# Patient Record
Sex: MALE | Race: BLACK OR AFRICAN AMERICAN | NOT HISPANIC OR LATINO | Employment: OTHER | URBAN - METROPOLITAN AREA
[De-identification: names, ages, dates, MRNs, and addresses within clinical notes are randomized per-mention and may not be internally consistent; named-entity substitution may affect disease eponyms.]

---

## 2017-10-19 ENCOUNTER — HOSPITAL ENCOUNTER (EMERGENCY)
Facility: HOSPITAL | Age: 77
Discharge: HOME | End: 2017-10-19
Attending: EMERGENCY MEDICINE
Payer: MEDICARE

## 2017-10-19 VITALS
BODY MASS INDEX: 27.28 KG/M2 | OXYGEN SATURATION: 98 % | HEIGHT: 68 IN | DIASTOLIC BLOOD PRESSURE: 84 MMHG | HEART RATE: 52 BPM | RESPIRATION RATE: 18 BRPM | SYSTOLIC BLOOD PRESSURE: 170 MMHG | WEIGHT: 180 LBS | TEMPERATURE: 98 F

## 2017-10-19 DIAGNOSIS — J01.90 ACUTE BACTERIAL SINUSITIS: ICD-10-CM

## 2017-10-19 DIAGNOSIS — R42 VERTIGO: Primary | ICD-10-CM

## 2017-10-19 DIAGNOSIS — B96.89 ACUTE BACTERIAL SINUSITIS: ICD-10-CM

## 2017-10-19 DIAGNOSIS — R53.83 FATIGUE: ICD-10-CM

## 2017-10-19 DIAGNOSIS — R53.1 WEAKNESS: ICD-10-CM

## 2017-10-19 LAB
ALBUMIN SERPL BCP-MCNC: 3.9 G/DL
ALP SERPL-CCNC: 72 U/L
ALT SERPL W/O P-5'-P-CCNC: 20 U/L
ANION GAP SERPL CALC-SCNC: 7 MMOL/L
AST SERPL-CCNC: 24 U/L
BASOPHILS # BLD AUTO: 0.02 K/UL
BASOPHILS NFR BLD: 0.5 %
BILIRUB SERPL-MCNC: 0.5 MG/DL
BILIRUB UR QL STRIP: NEGATIVE
BNP SERPL-MCNC: 22 PG/ML
BUN SERPL-MCNC: 14 MG/DL
CALCIUM SERPL-MCNC: 9.5 MG/DL
CHLORIDE SERPL-SCNC: 109 MMOL/L
CLARITY UR: CLEAR
CO2 SERPL-SCNC: 26 MMOL/L
COLOR UR: YELLOW
CREAT SERPL-MCNC: 1.2 MG/DL
DIFFERENTIAL METHOD: ABNORMAL
EOSINOPHIL # BLD AUTO: 0.1 K/UL
EOSINOPHIL NFR BLD: 3.4 %
ERYTHROCYTE [DISTWIDTH] IN BLOOD BY AUTOMATED COUNT: 13.2 %
EST. GFR  (AFRICAN AMERICAN): >60 ML/MIN/1.73 M^2
EST. GFR  (NON AFRICAN AMERICAN): 57 ML/MIN/1.73 M^2
GLUCOSE SERPL-MCNC: 105 MG/DL
GLUCOSE UR QL STRIP: NEGATIVE
HCT VFR BLD AUTO: 40.1 %
HGB BLD-MCNC: 13.8 G/DL
HGB UR QL STRIP: NEGATIVE
KETONES UR QL STRIP: NEGATIVE
LEUKOCYTE ESTERASE UR QL STRIP: NEGATIVE
LYMPHOCYTES # BLD AUTO: 1.2 K/UL
LYMPHOCYTES NFR BLD: 30.8 %
MCH RBC QN AUTO: 30.1 PG
MCHC RBC AUTO-ENTMCNC: 34.4 G/DL
MCV RBC AUTO: 87 FL
MONOCYTES # BLD AUTO: 0.3 K/UL
MONOCYTES NFR BLD: 8.6 %
NEUTROPHILS # BLD AUTO: 2.2 K/UL
NEUTROPHILS NFR BLD: 56.7 %
NITRITE UR QL STRIP: NEGATIVE
PH UR STRIP: 7 [PH] (ref 5–8)
PLATELET # BLD AUTO: 167 K/UL
PMV BLD AUTO: 10.2 FL
POCT GLUCOSE: 104 MG/DL (ref 70–110)
POTASSIUM SERPL-SCNC: 3.9 MMOL/L
PROT SERPL-MCNC: 7.5 G/DL
PROT UR QL STRIP: NEGATIVE
RBC # BLD AUTO: 4.59 M/UL
SODIUM SERPL-SCNC: 142 MMOL/L
SP GR UR STRIP: 1.01 (ref 1–1.03)
TROPONIN I SERPL DL<=0.01 NG/ML-MCNC: 0.01 NG/ML
URN SPEC COLLECT METH UR: NORMAL
UROBILINOGEN UR STRIP-ACNC: NEGATIVE EU/DL
WBC # BLD AUTO: 3.83 K/UL

## 2017-10-19 PROCEDURE — 25000003 PHARM REV CODE 250: Performed by: EMERGENCY MEDICINE

## 2017-10-19 PROCEDURE — 81003 URINALYSIS AUTO W/O SCOPE: CPT

## 2017-10-19 PROCEDURE — 93005 ELECTROCARDIOGRAM TRACING: CPT

## 2017-10-19 PROCEDURE — 83880 ASSAY OF NATRIURETIC PEPTIDE: CPT

## 2017-10-19 PROCEDURE — 36000 PLACE NEEDLE IN VEIN: CPT

## 2017-10-19 PROCEDURE — 93010 ELECTROCARDIOGRAM REPORT: CPT | Mod: ,,, | Performed by: INTERNAL MEDICINE

## 2017-10-19 PROCEDURE — 87086 URINE CULTURE/COLONY COUNT: CPT

## 2017-10-19 PROCEDURE — 85025 COMPLETE CBC W/AUTO DIFF WBC: CPT

## 2017-10-19 PROCEDURE — 80053 COMPREHEN METABOLIC PANEL: CPT

## 2017-10-19 PROCEDURE — 84484 ASSAY OF TROPONIN QUANT: CPT

## 2017-10-19 PROCEDURE — 99284 EMERGENCY DEPT VISIT MOD MDM: CPT | Mod: 25

## 2017-10-19 RX ORDER — DOXYCYCLINE 100 MG/1
100 CAPSULE ORAL 2 TIMES DAILY
Qty: 20 CAPSULE | Refills: 0 | Status: SHIPPED | OUTPATIENT
Start: 2017-10-19 | End: 2017-10-29

## 2017-10-19 RX ORDER — OXYBUTYNIN CHLORIDE 5 MG/1
5 TABLET ORAL 3 TIMES DAILY
COMMUNITY
End: 2021-09-28

## 2017-10-19 RX ORDER — MONTELUKAST SODIUM 10 MG/1
10 TABLET ORAL NIGHTLY
COMMUNITY

## 2017-10-19 RX ORDER — MECLIZINE HYDROCHLORIDE 25 MG/1
25 TABLET ORAL 3 TIMES DAILY PRN
Qty: 20 TABLET | Refills: 0 | Status: SHIPPED | OUTPATIENT
Start: 2017-10-19 | End: 2018-11-28

## 2017-10-19 RX ORDER — OMEPRAZOLE 20 MG/1
20 CAPSULE, DELAYED RELEASE ORAL DAILY
COMMUNITY

## 2017-10-19 RX ADMIN — SODIUM CHLORIDE 500 ML: 0.9 INJECTION, SOLUTION INTRAVENOUS at 04:10

## 2017-10-19 NOTE — ED TRIAGE NOTES
"Pt here with with c/o dizziness and fatigue that has gotten progressively worse over the past few days. Pt reports feeling "like I was going to pass out when I stand up". Reports headache. States he was seen by PCP for allergy meds, Claritin,  to help with fluid/sinnus pressure; pt denies any relief from med.   "

## 2017-10-19 NOTE — ED PROVIDER NOTES
Encounter Date: 10/19/2017    SCRIBE #1 NOTE: I, Emilia Ring, am scribing for, and in the presence of,  George Pate MD. I have scribed the following portions of the note - Other sections scribed: HPI and ROS.       History     Chief Complaint   Patient presents with    Fatigue     weak and dizzy since about 9am this morning     CC: Fatigue    HPI: This 77 y.o male who has Hypercholesteremia, Hypertension, LUTS, Nocturia presents to the ED c/o acute onset, constant fatigue with associated generalized weakness, dizziness, and a frontal headache described as heaviness and pressure that began this morning.  Patient reports his symptoms worsened with attempting to stand, walk, or bending.  Patient reports having the sensation of wanting to faint. Patient reports he has been monitoring his blood pressure, which he reports initially being elevated, but states it has since lowered.  Patient denies fever, chills, nausea, emesis, diarrhea, abdominal pain, back pain, chest pain, shortness of breath, or any other associated symptoms.  Patient reports having left eye surgery in July and right eye surgery in September.  Patient reports recently being evaluated and reports both pressures in his eyes were normal and that he has 20/20 vision.  No prior tx.  No alleviating factors.      The history is provided by the patient. No  was used.     Review of patient's allergies indicates:  No Known Allergies  Past Medical History:   Diagnosis Date    High cholesterol     Hypertension     Hypertrophy of prostate without urinary obstruction and other lower urinary tract symptoms (LUTS)     Impotence of organic origin     Nocturia     Urinary frequency      Past Surgical History:   Procedure Laterality Date    CATARACT EXTRACTION      cysto/turp      CYSTOSCOPY       History reviewed. No pertinent family history.  Social History   Substance Use Topics    Smoking status: Never Smoker    Smokeless tobacco:  Never Used    Alcohol use No     Review of Systems   Constitutional: Positive for fatigue. Negative for chills and fever.   HENT: Negative for ear pain and sore throat.    Eyes: Negative for pain.   Respiratory: Negative for cough and shortness of breath.    Cardiovascular: Negative for chest pain.   Gastrointestinal: Negative for abdominal pain, diarrhea, nausea and vomiting.   Genitourinary: Negative for dysuria.   Musculoskeletal: Negative for back pain.   Skin: Negative for rash.   Neurological: Positive for dizziness, weakness and headaches.   All other systems reviewed and are negative.      Physical Exam     Initial Vitals [10/19/17 1402]   BP Pulse Resp Temp SpO2   (!) 140/78 (!) 58 18 98.2 °F (36.8 °C) 98 %      MAP       98.67         Physical Exam    Nursing note and vitals reviewed.  Constitutional: He appears well-developed and well-nourished. He is not diaphoretic.   HENT:   Head: Normocephalic and atraumatic.   Mouth/Throat: Oropharynx is clear and moist.   Eyes: Conjunctivae and EOM are normal. No scleral icterus.   Neck: Normal range of motion. Neck supple.   Cardiovascular:   Bradycardic, regular rhythm   Pulmonary/Chest: Breath sounds normal.   Abdominal: Soft. He exhibits no distension. There is no tenderness.   Musculoskeletal: Normal range of motion.   Neurological: He is alert and oriented to person, place, and time. He has normal strength.   Skin: Skin is warm and dry. No rash noted.   Psychiatric: He has a normal mood and affect. Thought content normal.         ED Course   Procedures  Labs Reviewed   CBC W/ AUTO DIFFERENTIAL - Abnormal; Notable for the following:        Result Value    WBC 3.83 (*)     RBC 4.59 (*)     Hemoglobin 13.8 (*)     All other components within normal limits   COMPREHENSIVE METABOLIC PANEL - Abnormal; Notable for the following:     Anion Gap 7 (*)     eGFR if non  57 (*)     All other components within normal limits   CULTURE, URINE   URINALYSIS    TROPONIN I   B-TYPE NATRIURETIC PEPTIDE   POCT GLUCOSE   POCT GLUCOSE MONITORING CONTINUOUS     EKG Readings: (Independently Interpreted)   Initial Reading: No STEMI.   Sinus bradycardia, rate 59, normal axis/intervals, no ST/T-wave abnormalities.       X-Rays:   Independently Interpreted Readings:   Chest X-Ray: Normal heart size.  No infiltrates.  No acute abnormalities.     Medical Decision Making:   History:   I obtained history from: someone other than patient.  Old Medical Records: I decided to obtain old medical records.  Old Records Summarized: records from clinic visits and other records.  Initial Assessment:   77-year-old male with a history of vertigo presents to the emergency department complaining of generalized weakness, sinus congestion and dizziness for the past several days.  On exam, he has bilateral maxillary tenderness to palpation.  The remainder of his exam is unremarkable.  Vital signs reassuring - afebrile.  Orthostatics negative.  EKG reveals sinus bradycardia, rate 59, otherwise normal.    Independently Interpreted Test(s):   I have ordered and independently interpreted X-rays - see prior notes.  I have ordered and independently interpreted EKG Reading(s) - see prior notes  Clinical Tests:   Lab Tests: Ordered and Reviewed  Radiological Study: Ordered and Reviewed  Medical Tests: Ordered and Reviewed  ED Management:  Chest x-ray normal.  Basic labs including troponin, BNP and UA wnls.  CT head without acute findings.  IV fluids given.  Recommend close follow-up with his primary care physician for reevaluation further management including ENT referral if needed.  The meantime, he has been empirically started on doxycycline for presumed sinus along with meclizine to be used prn meclizine.  Return instructions discussed prior to discharge.            Scribe Attestation:   Scribe #1: I performed the above scribed service and the documentation accurately describes the services I performed. I  attest to the accuracy of the note.    Attending Attestation:           Physician Attestation for Scribe:  Physician Attestation Statement for Scribe #1: I, George Pate MD, reviewed documentation, as scribed by Emilia Ring in my presence, and it is both accurate and complete.                 ED Course      Clinical Impression:   The primary encounter diagnosis was Vertigo. Diagnoses of Fatigue, Weakness, and Acute bacterial sinusitis were also pertinent to this visit.    Disposition:   Disposition: Discharged                        George Pate MD  10/19/17 [id number]

## 2017-10-21 LAB — BACTERIA UR CULT: NO GROWTH

## 2018-11-28 ENCOUNTER — TELEPHONE (OUTPATIENT)
Dept: NEUROLOGY | Facility: CLINIC | Age: 78
End: 2018-11-28

## 2018-11-28 ENCOUNTER — OFFICE VISIT (OUTPATIENT)
Dept: NEUROLOGY | Facility: CLINIC | Age: 78
End: 2018-11-28
Payer: MEDICARE

## 2018-11-28 ENCOUNTER — LAB VISIT (OUTPATIENT)
Dept: LAB | Facility: HOSPITAL | Age: 78
End: 2018-11-28
Payer: MEDICARE

## 2018-11-28 VITALS
DIASTOLIC BLOOD PRESSURE: 76 MMHG | WEIGHT: 184.31 LBS | SYSTOLIC BLOOD PRESSURE: 136 MMHG | HEART RATE: 55 BPM | HEIGHT: 68 IN | BODY MASS INDEX: 27.93 KG/M2

## 2018-11-28 DIAGNOSIS — G62.9 PERIPHERAL POLYNEUROPATHY: ICD-10-CM

## 2018-11-28 DIAGNOSIS — R27.0 ATAXIA: ICD-10-CM

## 2018-11-28 DIAGNOSIS — D50.9 IRON DEFICIENCY ANEMIA, UNSPECIFIED IRON DEFICIENCY ANEMIA TYPE: ICD-10-CM

## 2018-11-28 DIAGNOSIS — R63.8 OTHER SYMPTOMS AND SIGNS CONCERNING FOOD AND FLUID INTAKE: ICD-10-CM

## 2018-11-28 DIAGNOSIS — R53.83 FATIGUE, UNSPECIFIED TYPE: ICD-10-CM

## 2018-11-28 DIAGNOSIS — H02.403 PTOSIS, BILATERAL: Chronic | ICD-10-CM

## 2018-11-28 DIAGNOSIS — R42 VERTIGO: Primary | ICD-10-CM

## 2018-11-28 LAB
BASOPHILS # BLD AUTO: 0.03 K/UL
BASOPHILS NFR BLD: 0.6 %
CREAT SERPL-MCNC: 1 MG/DL
DIFFERENTIAL METHOD: ABNORMAL
EOSINOPHIL # BLD AUTO: 0.1 K/UL
EOSINOPHIL NFR BLD: 1.7 %
ERYTHROCYTE [DISTWIDTH] IN BLOOD BY AUTOMATED COUNT: 13.1 %
EST. GFR  (AFRICAN AMERICAN): >60 ML/MIN/1.73 M^2
EST. GFR  (NON AFRICAN AMERICAN): >60 ML/MIN/1.73 M^2
FERRITIN SERPL-MCNC: 190 NG/ML
FOLATE SERPL-MCNC: 36.3 NG/ML
HCT VFR BLD AUTO: 41.6 %
HGB BLD-MCNC: 13.8 G/DL
IMM GRANULOCYTES # BLD AUTO: 0.01 K/UL
IMM GRANULOCYTES NFR BLD AUTO: 0.2 %
IRON SERPL-MCNC: 74 UG/DL
LYMPHOCYTES # BLD AUTO: 1.5 K/UL
LYMPHOCYTES NFR BLD: 30.9 %
MCH RBC QN AUTO: 30 PG
MCHC RBC AUTO-ENTMCNC: 33.2 G/DL
MCV RBC AUTO: 90 FL
MONOCYTES # BLD AUTO: 0.4 K/UL
MONOCYTES NFR BLD: 9.1 %
NEUTROPHILS # BLD AUTO: 2.7 K/UL
NEUTROPHILS NFR BLD: 57.5 %
NRBC BLD-RTO: 0 /100 WBC
PLATELET # BLD AUTO: 166 K/UL
PMV BLD AUTO: 10.3 FL
RBC # BLD AUTO: 4.6 M/UL
SATURATED IRON: 25 %
TOTAL IRON BINDING CAPACITY: 293 UG/DL
TRANSFERRIN SERPL-MCNC: 198 MG/DL
VIT B12 SERPL-MCNC: 393 PG/ML
WBC # BLD AUTO: 4.72 K/UL

## 2018-11-28 PROCEDURE — 84425 ASSAY OF VITAMIN B-1: CPT

## 2018-11-28 PROCEDURE — 83540 ASSAY OF IRON: CPT

## 2018-11-28 PROCEDURE — 82565 ASSAY OF CREATININE: CPT

## 2018-11-28 PROCEDURE — 99999 PR PBB SHADOW E&M-EST. PATIENT-LVL IV: CPT | Mod: PBBFAC,GC,, | Performed by: STUDENT IN AN ORGANIZED HEALTH CARE EDUCATION/TRAINING PROGRAM

## 2018-11-28 PROCEDURE — 82746 ASSAY OF FOLIC ACID SERUM: CPT

## 2018-11-28 PROCEDURE — 85025 COMPLETE CBC W/AUTO DIFF WBC: CPT

## 2018-11-28 PROCEDURE — 84165 PROTEIN E-PHORESIS SERUM: CPT | Mod: 26,,, | Performed by: PATHOLOGY

## 2018-11-28 PROCEDURE — 82728 ASSAY OF FERRITIN: CPT

## 2018-11-28 PROCEDURE — 84207 ASSAY OF VITAMIN B-6: CPT

## 2018-11-28 PROCEDURE — 84165 PROTEIN E-PHORESIS SERUM: CPT

## 2018-11-28 PROCEDURE — 82525 ASSAY OF COPPER: CPT

## 2018-11-28 PROCEDURE — 1101F PT FALLS ASSESS-DOCD LE1/YR: CPT | Mod: CPTII,GC,S$GLB, | Performed by: STUDENT IN AN ORGANIZED HEALTH CARE EDUCATION/TRAINING PROGRAM

## 2018-11-28 PROCEDURE — 82607 VITAMIN B-12: CPT

## 2018-11-28 PROCEDURE — 99205 OFFICE O/P NEW HI 60 MIN: CPT | Mod: GC,S$GLB,, | Performed by: STUDENT IN AN ORGANIZED HEALTH CARE EDUCATION/TRAINING PROGRAM

## 2018-11-28 PROCEDURE — 36415 COLL VENOUS BLD VENIPUNCTURE: CPT

## 2018-11-28 NOTE — PATIENT INSTRUCTIONS
Please stop by the lab on the 2nd floor to obtain labs.   Will have you back in the clinic when the results of labs and MRI is back

## 2018-11-28 NOTE — PROGRESS NOTES
Neurology Clinic  Initial Consult Visit    Patient Name: Chano Guillen  MRN: [id number]    CC: dizziness and weakness    HPI: Chano Guillen is a 78 y.o. male with HTN, presenting for evaluation of dizziness and lower extremity weakness.    Patient is complaining of generalized fatigue, lower extremity weakness and dizziness within the last 6-12 months. He states that dizziness happens specially when bending forward, but not necessarily when getting up from bed or chair, overall he feels unsteady when walking. Also endorses burning pain in bilateral lower extremities. He also has droopy eyelids which he and his wife state that has been always like this and is similar to family. He denies recent weight loss, difficulty swallowing, choking or difficulty breathing. He has a strange sensation behind his eyes, not necessarily pain but fullness (?), has PND and dry mouth. He has been taking meclizine since oct 2017 for dizziness w/o any response. He denies smoking or drinking alcohol      Review of Systems:  General: fevers -, chills -, fatigue +  Eyes: changes in vision -  ENT: hearing loss -, difficulty swallowing -, change in voice -  Respiratory: SOB -, cough -,  choking -  CV: chest pain -, palpitations -  GI: nausea -, vomiting -, abdominal pain -  Urinary: dysuria -, hematuria -, frequency -  Skin: rash -, change of color -  Neurological: Headache -, dizziness +, weakness +, numbness -, seizure -, confusion -, gait problem +  Psychiatric: hallucinations -, dysphoric mood -, anxiety -      Past Medical History  Past Medical History:   Diagnosis Date    High cholesterol     Hypertension     Hypertrophy of prostate without urinary obstruction and other lower urinary tract symptoms (LUTS)     Impotence of organic origin     Nocturia     Urinary frequency        Medications    Current Outpatient Medications:     amlodipine (NORVASC) 5 MG tablet, Take 10 mg by mouth once daily. , Disp: , Rfl:     aspirin  "(ECOTRIN) 81 MG EC tablet, Take 81 mg by mouth once daily., Disp: , Rfl:     finasteride (PROSCAR) 5 mg tablet, Take 5 mg by mouth once daily., Disp: , Rfl:     montelukast (SINGULAIR) 10 mg tablet, Take 10 mg by mouth every evening., Disp: , Rfl:     MULTIVIT-MINERALS/FERROUS FUM (MULTI VITAMIN ORAL), Take by mouth., Disp: , Rfl:     OMEGA-3/DHA/EPA/FISH OIL (OMEGA-3 ORAL), Take by mouth., Disp: , Rfl:     omeprazole (PRILOSEC) 20 MG capsule, Take 20 mg by mouth once daily., Disp: , Rfl:     oxybutynin (DITROPAN) 5 MG Tab, Take 5 mg by mouth 3 (three) times daily., Disp: , Rfl:     VITAMIN D2 50,000 unit capsule, , Disp: , Rfl: 0  Any other notable medications as documented in HPI    Allergies  Review of patient's allergies indicates:  No Known Allergies    Social History  Social History     Socioeconomic History    Marital status:      Spouse name: Not on file    Number of children: Not on file    Years of education: Not on file    Highest education level: Not on file   Social Needs    Financial resource strain: Not on file    Food insecurity - worry: Not on file    Food insecurity - inability: Not on file    Transportation needs - medical: Not on file    Transportation needs - non-medical: Not on file   Occupational History    Not on file   Tobacco Use    Smoking status: Never Smoker    Smokeless tobacco: Never Used   Substance and Sexual Activity    Alcohol use: No    Drug use: No    Sexual activity: Not on file   Other Topics Concern    Not on file   Social History Narrative    Not on file     Any other notable Social History as documented in HPI.    Family History  No family history on file.  Any other notable FMH as documented in HPI.    Physical Exam  /76   Pulse (!) 55   Ht 5' 8" (1.727 m)   Wt 83.6 kg (184 lb 4.9 oz)   BMI 28.02 kg/m²     General: Well-developed, well-groomed. No apparent distress  HENT: Normocephalic, atraumatic. No carotid bruits auscultated. Ear " canal clear and tympanic membrane with normal cone of light and no erythema nor bulge.  Cardiovascular: Regular rate and rhythm with no murmurs, rubs or gallops.    Chest: Lungs clear to auscultation bilaterally.  No wheezes, stridor, ronchi appreciated.  Abdomen: Normoactive bowel sounds present.  Soft, nontender to palpation.  Musculoskeletal: No peripheral edema  Neurologic Exam: The patient is awake, alert and oriented. Language is fluent.  Fund of knowledge is appropriate.     Cranial nerves:   PERRLA. EOM intact. No Nystagmus. No ophthalmoplegia.  Visual fields are full to confrontation.    Facial sensation is normal to light touch.   Facial expression is full and symmetric, except for bilateral ptosis.  Hearing is intact bilaterally.   Palate elevates symmetrically.   SCM and Trapezius full strength bilaterally.   Tongue is midline.     Motor examination   normal bulk and tone in all four limbs. There are no atrophy or fasciculations.   Strength is 4+/5 in lower extremities and 5/5 on upper extremities bilaterally.    Sensory examination  Sensation to light touch: intact in BUE and BLE  Sensation to temperature: intact in BUE and BLE  Sensation to vibration: decreased up to mid shin  Romberg is positive.    Deep tendon reflexes   1+ and symmetric in the upper and lower extremities bilaterally.    No clonus. Babinski normal bilaterally.    Gait and Coordination:  Normal gait.  abnormal tandem walking.  Finger to nose is normal bilaterally.      Lab and Test Results    WBC   Date Value Ref Range Status   11/28/2018 4.72 3.90 - 12.70 K/uL Final   10/19/2017 3.83 (L) 3.90 - 12.70 K/uL Final   03/19/2015 4.39 3.90 - 12.70 K/uL Final     Hemoglobin   Date Value Ref Range Status   11/28/2018 13.8 (L) 14.0 - 18.0 g/dL Final   10/19/2017 13.8 (L) 14.0 - 18.0 g/dL Final   03/19/2015 12.6 (L) 14.0 - 18.0 g/dL Final     Hematocrit   Date Value Ref Range Status   11/28/2018 41.6 40.0 - 54.0 % Final   10/19/2017 40.1 40.0  - 54.0 % Final   03/19/2015 36.9 (L) 40.0 - 54.0 % Final     Platelets   Date Value Ref Range Status   11/28/2018 166 150 - 350 K/uL Final   10/19/2017 167 150 - 350 K/uL Final   03/19/2015 176 150 - 350 K/uL Final     Glucose   Date Value Ref Range Status   10/19/2017 105 70 - 110 mg/dL Final   03/19/2015 97 70 - 110 mg/dL Final   06/29/2011 115 (H) 70 - 110 mg/dl Final     Sodium   Date Value Ref Range Status   10/19/2017 142 136 - 145 mmol/L Final   03/19/2015 144 136 - 145 mmol/L Final   06/29/2011 143 136 - 145 mmol/L Final     Potassium   Date Value Ref Range Status   10/19/2017 3.9 3.5 - 5.1 mmol/L Final   03/19/2015 3.8 3.5 - 5.1 mmol/L Final   06/29/2011 3.1 (L) 3.5 - 5.1 mmol/L Final     Chloride   Date Value Ref Range Status   10/19/2017 109 95 - 110 mmol/L Final   03/19/2015 109 95 - 110 mmol/L Final   06/29/2011 112 (H) 95 - 110 mmol/L Final     CO2   Date Value Ref Range Status   10/19/2017 26 23 - 29 mmol/L Final   03/19/2015 26 23 - 29 mmol/L Final   06/29/2011 21 (L) 23.0 - 29.0 mmol/L Final     BUN, Bld   Date Value Ref Range Status   10/19/2017 14 8 - 23 mg/dL Final   03/19/2015 17 8 - 23 mg/dL Final   06/29/2011 25 (H) 8 - 23 mg/dl Final     Creatinine   Date Value Ref Range Status   11/28/2018 1.0 0.5 - 1.4 mg/dL Final   10/19/2017 1.2 0.5 - 1.4 mg/dL Final   03/19/2015 1.3 0.5 - 1.4 mg/dL Final     Calcium   Date Value Ref Range Status   10/19/2017 9.5 8.7 - 10.5 mg/dL Final   03/19/2015 9.6 8.7 - 10.5 mg/dL Final   06/29/2011 10.0 8.7 - 10.5 mg/dl Final     Alkaline Phosphatase   Date Value Ref Range Status   10/19/2017 72 55 - 135 U/L Final   03/19/2015 46 (L) 55 - 135 U/L Final   06/29/2011 67 55 - 135 U/L Final     ALT   Date Value Ref Range Status   10/19/2017 20 10 - 44 U/L Final   03/19/2015 15 10 - 44 U/L Final   06/29/2011 29 10 - 44 U/L Final     AST   Date Value Ref Range Status   10/19/2017 24 10 - 40 U/L Final   03/19/2015 20 10 - 40 U/L Final   06/29/2011 28 10 - 40 U/L Final          Images:  None      Assessment and Plan    Problem List Items Addressed This Visit        Neuro    Ataxia    Relevant Orders    MRI Brain W WO Contrast (Completed)    VITAMIN B12 (Completed)    FOLATE (Completed)    VITAMIN B1 (Completed)    Peripheral neuropathy    Current Assessment & Plan     Will send for peripheral neuropathy labs   Will have him follow up with results         Relevant Orders    IRON AND TIBC (Completed)    Ferritin (Completed)    VITAMIN B6 (Completed)    COPPER, SERUM (Completed)    PROTEIN ELECTROPHORESIS, SERUM (Completed)    VDRL, CSF       Ophtho    Ptosis, bilateral (Chronic)    Current Assessment & Plan     Benign Familial ptosis  No work up necessary            ENT    Vertigo - Primary    Current Assessment & Plan     Need to rule out both central and peripheral pathologies due to poor description of the symptoms unable to distinguish clinically    MRI Brain W/WO   VNG and posturography         Relevant Orders    MRI Brain W WO Contrast (Completed)       Other    Fatigue    Current Assessment & Plan     Might be due to iron deficiency          Relevant Orders    IRON AND TIBC (Completed)    Ferritin (Completed)    Creatinine, serum (Completed)    CBC auto differential (Completed)      Other Visit Diagnoses     Other symptoms and signs concerning food and fluid intake         Relevant Orders    IRON AND TIBC (Completed)    Iron deficiency anemia, unspecified iron deficiency anemia type         Relevant Orders    Ferritin (Completed)              Karina Marks MD  PGY-II, Neurology Resident  [facility]  [address] [place] [address]

## 2018-11-29 LAB
ALBUMIN SERPL ELPH-MCNC: 4.35 G/DL
ALPHA1 GLOB SERPL ELPH-MCNC: 0.27 G/DL
ALPHA2 GLOB SERPL ELPH-MCNC: 0.63 G/DL
B-GLOBULIN SERPL ELPH-MCNC: 0.74 G/DL
GAMMA GLOB SERPL ELPH-MCNC: 1.31 G/DL
PATHOLOGIST INTERPRETATION SPE: NORMAL
PROT SERPL-MCNC: 7.3 G/DL

## 2018-12-03 LAB
PYRIDOXAL SERPL-MCNC: 54 UG/L (ref 5–50)
VIT B1 BLD-MCNC: 47 UG/L (ref 38–122)

## 2018-12-04 LAB — COPPER SERPL-MCNC: 936 UG/L (ref 665–1480)

## 2018-12-07 ENCOUNTER — HOSPITAL ENCOUNTER (OUTPATIENT)
Dept: RADIOLOGY | Facility: HOSPITAL | Age: 78
Discharge: HOME | End: 2018-12-07
Attending: STUDENT IN AN ORGANIZED HEALTH CARE EDUCATION/TRAINING PROGRAM
Payer: MEDICARE

## 2018-12-07 DIAGNOSIS — R27.0 ATAXIA: ICD-10-CM

## 2018-12-07 DIAGNOSIS — R42 VERTIGO: ICD-10-CM

## 2018-12-07 PROCEDURE — 70553 MRI BRAIN STEM W/O & W/DYE: CPT | Mod: TC

## 2018-12-07 PROCEDURE — 25500020 PHARM REV CODE 255: Performed by: STUDENT IN AN ORGANIZED HEALTH CARE EDUCATION/TRAINING PROGRAM

## 2018-12-07 PROCEDURE — A9585 GADOBUTROL INJECTION: HCPCS | Performed by: STUDENT IN AN ORGANIZED HEALTH CARE EDUCATION/TRAINING PROGRAM

## 2018-12-07 PROCEDURE — 70553 MRI BRAIN STEM W/O & W/DYE: CPT | Mod: 26,,, | Performed by: RADIOLOGY

## 2018-12-07 RX ORDER — GADOBUTROL 604.72 MG/ML
9 INJECTION INTRAVENOUS
Status: COMPLETED | OUTPATIENT
Start: 2018-12-07 | End: 2018-12-07

## 2018-12-07 RX ADMIN — GADOBUTROL 9 ML: 604.72 INJECTION INTRAVENOUS at 01:12

## 2018-12-07 NOTE — PROGRESS NOTES
I have personally seen and evaluated this patient. I have confirmed key portions of the history and examination. I concurred with the residents findings and documentation      Ina Gomez M.D

## 2018-12-13 ENCOUNTER — TELEPHONE (OUTPATIENT)
Dept: NEUROLOGY | Facility: CLINIC | Age: 78
End: 2018-12-13

## 2018-12-13 NOTE — TELEPHONE ENCOUNTER
----- Message from Lashell Carrillo sent at 12/13/2018  1:55 PM CST -----  Test Results    Type of Test:CT/MRI  Date of Test:  Communication Preference:Tamika (wife) @ [phone]  Additional Information:

## 2018-12-14 ENCOUNTER — TELEPHONE (OUTPATIENT)
Dept: NEUROLOGY | Facility: CLINIC | Age: 78
End: 2018-12-14

## 2018-12-14 NOTE — TELEPHONE ENCOUNTER
----- Message from Lashell Carrillo sent at 12/14/2018  1:25 PM CST -----  Test Results    Type of Test:MRI/Ct  Date of Test:12/7  Communication Preference: Tamika (wife) @ [phone]  Additional Information:

## 2019-01-02 ENCOUNTER — OFFICE VISIT (OUTPATIENT)
Dept: NEUROLOGY | Facility: CLINIC | Age: 79
End: 2019-01-02
Payer: MEDICARE

## 2019-01-02 VITALS
DIASTOLIC BLOOD PRESSURE: 77 MMHG | HEIGHT: 68 IN | BODY MASS INDEX: 27.87 KG/M2 | HEART RATE: 46 BPM | SYSTOLIC BLOOD PRESSURE: 142 MMHG | WEIGHT: 183.88 LBS

## 2019-01-02 DIAGNOSIS — E07.9 DISORDER OF THYROID: ICD-10-CM

## 2019-01-02 DIAGNOSIS — R42 VERTIGO: Primary | ICD-10-CM

## 2019-01-02 DIAGNOSIS — R26.89 IMBALANCE: ICD-10-CM

## 2019-01-02 DIAGNOSIS — H51.9 ABNORMAL EYE MOVEMENTS: ICD-10-CM

## 2019-01-02 PROCEDURE — 1101F PT FALLS ASSESS-DOCD LE1/YR: CPT | Mod: CPTII,GC,S$GLB, | Performed by: STUDENT IN AN ORGANIZED HEALTH CARE EDUCATION/TRAINING PROGRAM

## 2019-01-02 PROCEDURE — 99999 PR PBB SHADOW E&M-EST. PATIENT-LVL IV: ICD-10-PCS | Mod: PBBFAC,GC,, | Performed by: STUDENT IN AN ORGANIZED HEALTH CARE EDUCATION/TRAINING PROGRAM

## 2019-01-02 PROCEDURE — 99999 PR PBB SHADOW E&M-EST. PATIENT-LVL IV: CPT | Mod: PBBFAC,GC,, | Performed by: STUDENT IN AN ORGANIZED HEALTH CARE EDUCATION/TRAINING PROGRAM

## 2019-01-02 PROCEDURE — 1101F PR PT FALLS ASSESS DOC 0-1 FALLS W/OUT INJ PAST YR: ICD-10-PCS | Mod: CPTII,GC,S$GLB, | Performed by: STUDENT IN AN ORGANIZED HEALTH CARE EDUCATION/TRAINING PROGRAM

## 2019-01-02 PROCEDURE — 99215 PR OFFICE/OUTPT VISIT, EST, LEVL V, 40-54 MIN: ICD-10-PCS | Mod: GC,S$GLB,, | Performed by: STUDENT IN AN ORGANIZED HEALTH CARE EDUCATION/TRAINING PROGRAM

## 2019-01-02 PROCEDURE — 99215 OFFICE O/P EST HI 40 MIN: CPT | Mod: GC,S$GLB,, | Performed by: STUDENT IN AN ORGANIZED HEALTH CARE EDUCATION/TRAINING PROGRAM

## 2019-01-02 NOTE — ASSESSMENT & PLAN NOTE
Need to rule out both central and peripheral pathologies due to poor description of the symptoms unable to distinguish clinically    MRI Brain W/WO   VNG and posturography

## 2019-01-03 ENCOUNTER — PATIENT MESSAGE (OUTPATIENT)
Dept: NEUROLOGY | Facility: CLINIC | Age: 79
End: 2019-01-03

## 2019-01-07 NOTE — PROGRESS NOTES
Neurology Clinic  Follow up Visit    Patient Name: Chano Guillen  MRN: [id number]    CC: dizziness and weakness    HPI: Chano Guillen is a 78 y.o. male with HTN, presenting for follow up on dizziness and lower extremity weakness.    The patient is still complaining of generalized weakness and dizziness specially when bending forward. He also mentions retr-orbital pressure. symptoms are intermittent, and has been going on for 2 years. Prior labs and imaging obtained after the last visit all within normal limits.    Initial Encounter on 11/28/18:  Patient is complaining of generalized fatigue, lower extremity weakness and dizziness within the last 6-12 months. He states that dizziness happens specially when bending forward, but not necessarily when getting up from bed or chair, overall he feels unsteady when walking. Also endorses burning pain in bilateral lower extremities. He also has droopy eyelids which he and his wife state that has been always like this and is similar to family. He denies recent weight loss, difficulty swallowing, choking or difficulty breathing. He has a strange sensation behind his eyes, not necessarily pain but fullness (?), has PND and dry mouth. He has been taking meclizine since oct 2017 for dizziness w/o any response. He denies smoking or drinking alcohol      Review of Systems:  General: fevers -, chills -, fatigue +  Eyes: changes in vision -  ENT: hearing loss -, difficulty swallowing -, change in voice -  Respiratory: SOB -, cough -,  choking -  CV: chest pain -, palpitations -  GI: nausea -, vomiting -, abdominal pain -  Urinary: dysuria -, hematuria -, frequency -  Skin: rash -, change of color -  Neurological: Headache -, dizziness +, weakness +, numbness -, seizure -, confusion -, gait problem +  Psychiatric: hallucinations -, dysphoric mood -, anxiety -      Past Medical History  Past Medical History:   Diagnosis Date    High cholesterol     Hypertension     Hypertrophy  of prostate without urinary obstruction and other lower urinary tract symptoms (LUTS)     Impotence of organic origin     Nocturia     Urinary frequency        Medications    Current Outpatient Medications:     amlodipine (NORVASC) 5 MG tablet, Take 10 mg by mouth once daily. , Disp: , Rfl:     aspirin (ECOTRIN) 81 MG EC tablet, Take 81 mg by mouth once daily., Disp: , Rfl:     finasteride (PROSCAR) 5 mg tablet, Take 5 mg by mouth once daily., Disp: , Rfl:     montelukast (SINGULAIR) 10 mg tablet, Take 10 mg by mouth every evening., Disp: , Rfl:     MULTIVIT-MINERALS/FERROUS FUM (MULTI VITAMIN ORAL), Take by mouth., Disp: , Rfl:     omeprazole (PRILOSEC) 20 MG capsule, Take 20 mg by mouth once daily., Disp: , Rfl:     oxybutynin (DITROPAN) 5 MG Tab, Take 5 mg by mouth 3 (three) times daily., Disp: , Rfl:     VITAMIN D2 50,000 unit capsule, 5,000 Units. , Disp: , Rfl: 0    OMEGA-3/DHA/EPA/FISH OIL (OMEGA-3 ORAL), Take by mouth., Disp: , Rfl:     sod bicarb-sod chlor-neti pot (SINUS WASH NETIPOT) pkdv, 1 packet by Nasal route 2 (two) times daily as needed (nasal congestion)., Disp: , Rfl:   Any other notable medications as documented in HPI    Allergies  Review of patient's allergies indicates:  No Known Allergies    Social History  Social History     Socioeconomic History    Marital status:      Spouse name: Not on file    Number of children: Not on file    Years of education: Not on file    Highest education level: Not on file   Social Needs    Financial resource strain: Not on file    Food insecurity - worry: Not on file    Food insecurity - inability: Not on file    Transportation needs - medical: Not on file    Transportation needs - non-medical: Not on file   Occupational History    Not on file   Tobacco Use    Smoking status: Never Smoker    Smokeless tobacco: Never Used   Substance and Sexual Activity    Alcohol use: No    Drug use: No    Sexual activity: Not on file   Other  "Topics Concern    Not on file   Social History Narrative    Not on file     Any other notable Social History as documented in HPI.    Family History  History reviewed. No pertinent family history.  Any other notable FMH as documented in HPI.    Physical Exam  BP (!) 142/77   Pulse (!) 46   Ht 5' 8" (1.727 m)   Wt 83.4 kg (183 lb 13.8 oz)   BMI 27.96 kg/m²     General: Well-developed, well-groomed. No apparent distress  HENT: Normocephalic, atraumatic. No carotid bruits auscultated. Ear canal clear and tympanic membrane with normal cone of light and no erythema nor bulge.  Cardiovascular: Regular rate and rhythm with no murmurs, rubs or gallops.    Chest: Lungs clear to auscultation bilaterally.  No wheezes, stridor, ronchi appreciated.  Abdomen: Normoactive bowel sounds present.  Soft, nontender to palpation.  Musculoskeletal: No peripheral edema  Neurologic Exam: The patient is awake, alert and oriented. Language is fluent.  Fund of knowledge is appropriate.     Cranial nerves:   PERRLA. EOM intact. No Nystagmus. No ophthalmoplegia.  Visual fields are full to confrontation.    Facial sensation is normal to light touch.   Facial expression is full and symmetric, except for bilateral ptosis.  Hearing is intact bilaterally.   Palate elevates symmetrically.   SCM and Trapezius full strength bilaterally.   Tongue is midline.     Motor examination   normal bulk and tone in all four limbs. There are no atrophy or fasciculations.   Strength is 4+/5 in lower extremities and 5/5 on upper extremities bilaterally.    Sensory examination  Sensation to light touch: intact in BUE and BLE  Sensation to temperature: intact in BUE and BLE  Sensation to vibration: decreased up to mid shin  Romberg is positive.    Deep tendon reflexes   1+ and symmetric in the upper and lower extremities bilaterally.    No clonus. Babinski normal bilaterally.    Gait and Coordination:  Normal gait.  abnormal tandem walking.  Finger to nose is " normal bilaterally.      Lab and Test Results    WBC   Date Value Ref Range Status   11/28/2018 4.72 3.90 - 12.70 K/uL Final   10/19/2017 3.83 (L) 3.90 - 12.70 K/uL Final   03/19/2015 4.39 3.90 - 12.70 K/uL Final     Hemoglobin   Date Value Ref Range Status   11/28/2018 13.8 (L) 14.0 - 18.0 g/dL Final   10/19/2017 13.8 (L) 14.0 - 18.0 g/dL Final   03/19/2015 12.6 (L) 14.0 - 18.0 g/dL Final     Hematocrit   Date Value Ref Range Status   11/28/2018 41.6 40.0 - 54.0 % Final   10/19/2017 40.1 40.0 - 54.0 % Final   03/19/2015 36.9 (L) 40.0 - 54.0 % Final     Platelets   Date Value Ref Range Status   11/28/2018 166 150 - 350 K/uL Final   10/19/2017 167 150 - 350 K/uL Final   03/19/2015 176 150 - 350 K/uL Final     Glucose   Date Value Ref Range Status   10/19/2017 105 70 - 110 mg/dL Final   03/19/2015 97 70 - 110 mg/dL Final   06/29/2011 115 (H) 70 - 110 mg/dl Final     Sodium   Date Value Ref Range Status   10/19/2017 142 136 - 145 mmol/L Final   03/19/2015 144 136 - 145 mmol/L Final   06/29/2011 143 136 - 145 mmol/L Final     Potassium   Date Value Ref Range Status   10/19/2017 3.9 3.5 - 5.1 mmol/L Final   03/19/2015 3.8 3.5 - 5.1 mmol/L Final   06/29/2011 3.1 (L) 3.5 - 5.1 mmol/L Final     Chloride   Date Value Ref Range Status   10/19/2017 109 95 - 110 mmol/L Final   03/19/2015 109 95 - 110 mmol/L Final   06/29/2011 112 (H) 95 - 110 mmol/L Final     CO2   Date Value Ref Range Status   10/19/2017 26 23 - 29 mmol/L Final   03/19/2015 26 23 - 29 mmol/L Final   06/29/2011 21 (L) 23.0 - 29.0 mmol/L Final     BUN, Bld   Date Value Ref Range Status   10/19/2017 14 8 - 23 mg/dL Final   03/19/2015 17 8 - 23 mg/dL Final   06/29/2011 25 (H) 8 - 23 mg/dl Final     Creatinine   Date Value Ref Range Status   11/28/2018 1.0 0.5 - 1.4 mg/dL Final   10/19/2017 1.2 0.5 - 1.4 mg/dL Final   03/19/2015 1.3 0.5 - 1.4 mg/dL Final     Calcium   Date Value Ref Range Status   10/19/2017 9.5 8.7 - 10.5 mg/dL Final   03/19/2015 9.6 8.7 - 10.5  mg/dL Final   06/29/2011 10.0 8.7 - 10.5 mg/dl Final     Alkaline Phosphatase   Date Value Ref Range Status   10/19/2017 72 55 - 135 U/L Final   03/19/2015 46 (L) 55 - 135 U/L Final   06/29/2011 67 55 - 135 U/L Final     ALT   Date Value Ref Range Status   10/19/2017 20 10 - 44 U/L Final   03/19/2015 15 10 - 44 U/L Final   06/29/2011 29 10 - 44 U/L Final     AST   Date Value Ref Range Status   10/19/2017 24 10 - 40 U/L Final   03/19/2015 20 10 - 40 U/L Final   06/29/2011 28 10 - 40 U/L Final     Images:    MRI Brain W/WO (11/28/18):  1. No evidence of acute intracranial pathology.  2. Modest generalized cerebral volume loss and chronic microvascular ischemic changes, not unusually advanced for patient's age.    Assessment and Plan    Problem List Items Addressed This Visit        Ophtho    Abnormal eye movements    Current Assessment & Plan     Limited bilateral conjugate gaze  Might be seen in thyroid abnormalities    Will obtain TFTs         Relevant Orders    TSH (Completed)       ENT    Vertigo - Primary    Current Assessment & Plan     Please see imbalance  Also symptoms of retro-orbital pressure and dizziness could be due to chronic sinusitis, recommended follow up with ENT         Relevant Orders    Vestibular test    Compu dynamic posturography    Ambulatory Referral to Physical/Occupational Therapy       Other    Imbalance    Current Assessment & Plan     Will send for VNG and posturography  Recommend outpatient PT/OT for gait training         Relevant Orders    Vestibular test    Compu dynamic posturography    Ambulatory Referral to Physical/Occupational Therapy      Other Visit Diagnoses     Disorder of thyroid         Relevant Orders    TSH (Completed)              Karina Marks MD  PGY-II, Neurology Resident  [facility] [address] [place] [address]

## 2019-01-07 NOTE — ASSESSMENT & PLAN NOTE
Please see imbalance  Also symptoms of retro-orbital pressure and dizziness could be due to chronic sinusitis, recommended follow up with ENT

## 2019-01-08 ENCOUNTER — CLINICAL SUPPORT (OUTPATIENT)
Dept: REHABILITATION | Facility: HOSPITAL | Age: 79
End: 2019-01-08
Attending: STUDENT IN AN ORGANIZED HEALTH CARE EDUCATION/TRAINING PROGRAM
Payer: MEDICARE

## 2019-01-08 DIAGNOSIS — H81.90 VESTIBULAR DIZZINESS: ICD-10-CM

## 2019-01-08 DIAGNOSIS — Z74.09 IMPAIRED FUNCTIONAL MOBILITY, BALANCE, GAIT, AND ENDURANCE: ICD-10-CM

## 2019-01-08 PROCEDURE — G8979 MOBILITY GOAL STATUS: HCPCS | Mod: CJ,PO

## 2019-01-08 PROCEDURE — G8978 MOBILITY CURRENT STATUS: HCPCS | Mod: CK,PO

## 2019-01-08 PROCEDURE — 97162 PT EVAL MOD COMPLEX 30 MIN: CPT | Mod: PO

## 2019-01-08 NOTE — PLAN OF CARE
Outpatient Neurological Vestibular Rehabilitation Physical Therapy Evaluation      Name: Chano Guillen  Clinic Number: [id number]    Diagnosis:   Encounter Diagnoses   Name Primary?    Impaired functional mobility, balance, gait, and endurance     Vestibular dizziness      Physician: Karina Marks MD  Treatment Orders: PT Eval and Treat  Past Medical History:   Diagnosis Date    High cholesterol     Hypertension     Hypertrophy of prostate without urinary obstruction and other lower urinary tract symptoms (LUTS)     Impotence of organic origin     Nocturia     Urinary frequency        Evaluation Date: 01/08/19  Visit #: 1  Plan of care expiration: 03/05/19  Precautions: Standard, fall risk    History     Medical Diagnosis: Vertigo and Imbalance  PT Diagnosis: Dizziness and Imbalance  PMH significant for: Ataxia, peripheral neuropathy, abnormal eye movements, vertigo, fatigue, imbalance, hypertension, Cataract removal  Prior testing/imaging:    MRI Brain W/WO (11/28/18):    1. No evidence of acute intracranial pathology.    2. Modest generalized cerebral volume loss and chronic microvascular ischemic changes, not unusually advanced for patient's age.    History of Present Illness: Chano is a 78 y.o. male that presents to [facility] secondary to complaints of dizziness, nausea, and imbalance. Pt reports that this started approximately 2 years ago and has been experiencing these symptoms on and off since them. Pt's family reports that the frequency is usually daily and can last a few days. The symptoms are usually constant, but also sometimes increase as the day progresses.    Chief complaints:  1. Dizziness  2. Nausea  3. Imbalance    History of Current Symptoms   Triggers:  Bending forward or sitting up.   Alleviating Factors: Denies   Description of symptoms: Feels like he is walking on an unsteady surface.   Onset: Approximately 2 years ago   Frequency: Sometimes daily and  will last a few days   Duration: constant but sometimes increases as the day progresses.   Positional changes: Laying down decreases it.   Limitations due to symptoms: No limitations but decreased gait speed.    History of migraines: Yes. Had a migraine a few days ago but it did not make it worse. Gets migraines at night when he slows down.    Falls in the past year: Denies  Prior Therapy: Yes, for stability over a year ago. Same thing, and did not find that it helped or decreased symptoms.  Nutrition:  Normal  Social History/Support systems: Yes, family present.  Place of Residence (Steps/Adaptations/Levels): Single story with 5 steps. Has railing and uses it when necesasry.  Previous functional status includes: Independent  Current functional status:  Independent. Still driving and feels unsteady when driving.  Exercise routine prior to onset : Works out at the gym even when dizzy. Continues to use machines.  DME owned: None  Work/Job description includes: Retired    Subjective   Pt stated goals: Improving steadiness, movement, and pace.  Family present/states: Improving steadiness, movement, and pace.    Pain: Denies, but has it with indigestion. Dizziness is present (7/10).    Objective   - Follows commands: 100% of time   - Speech: no deficits apparent at this time    BP: NT, but runs ~140/70 mmHg per family. Pt's family reports that patient takes medication appropriately, and BP is well controlled with medication.     Mental status: alert, oriented to person, place, and time, normal mood, behavior, speech, dress, motor activity, and thought processes  Appearance: Casually dressed  Behavior:  calm, cooperative and adequate rapport can be established  Attention Span and Concentration:  Normal    Dominant hand:  right     Posture Alignment in sitting:   Head: forward head   Scapulae: Slightly rounded   Trunk: increased kyphosis   Pelvis: None Noted  Legs: None noted     Posture Alignment in standing:   Head:  "forward head   Scapulae: slightly rounded  Trunk: increased kyphosis   Pelvis: None noted  Legs: None noted     Sensation: Light Touch: Impaired: Per PMHx of peripheral neuropathy.          Proprioception: Impaired: Per PMHx of peripheral neuropathy., Kinesthesia: Impaired: Per PMHx of peripheral neuropathy.         Visual/Auditory:   Saccades: Impaired: mild increased dizziness and fatigue.  Acuity: Intact, but sometimes wear glasses  R/L discrimination: Intact  Visual field: Intact  Smooth Pursuit: WFL, but nystagmus and end range to the left, and some jerky movements with horizontal and diagonal movements up tot he patients L.   Convergence: WFL  VOR: Impaired: Mild increase in dizziness. Mild slippage noted.  VCR: N/T    ROM:     CERVICAL SPINE  Flexion 63 degrees (80-90 deg)  Extension 36 degrees (70-80 deg)  L side bend 35 degrees, R side bend 36 degrees (20-45 degrees)  L rotation 60 degrees, R rotation 54 degrees (70-90 degrees)  Are concurrent symptoms present with any of these movements: Yes c/ extension    Modified VAS in sitting (rotation, then extension):  R: (-)  L: (-)         RANGE OF MOTION--LOWER EXTREMITIES  (R) LE Hip: normal   Knee: normal   Ankle: normal, DF to neutral    (L) LE: Hip: normal   Knee: normal   Ankle: normal, DF to neutral    Strength: manual muscle test grades below   Upper Extremity Strength- not formally tested this date    Lower Extremity Strength  Right LE  Left LE    Hip Flexion: 5/5 Hip Flexion: 5/5   Hip Extension:  5/5 Hip Extension: 5/5   Hip Abduction: 5/5 Hip Abduction: 5/5   Hip Adduction: 5/5 Hip Adduction 5/5   Knee Extension: 5/5 Knee Extension: 5/5   Knee Flexion: 5/5 Knee Flexion: 5/5   Ankle Dorsiflexion: 5/5 Ankle Dorsiflexion: 5/5   Ankle Plantarflexion: 5/5 Ankle Plantarflexion: 5/5     Abdominal Strength: 3/5 minimum. Not fully test    Flexibility: N/T    NICOL SENSORY TESTING:  (P= Pass, F= Fail; note any sway; hold each position for 30")  Condition 1: (firm " surface/feet together/eyes open) P  Condition 2: (firm surface/feet together/eyes closed) P, Increased dizziness with EC  Condition 3: (firm surface/feet in tandem/eyes open) F,  21 sec  Condition 4: (firm surface/feet in tandem/eyes closed) F, 2 sec  Condition 5: (soft surface/feet together/eyes open) F, 13 sec  Condition 6: (soft surface/feet together/eyes closed) F, 1 sec  Condition 7: (Fakuda step test), F, rotated approximately 90 degress to the L       Functional Gait Assessment:   1. Gait on level surface =  2   2. Change in Gait Speed = 2  3. Gait with horizontal head turns  = 2  4. Gait with vertical head turns = 2  5. Gait with pivot turns = 2  6. Step over obstacle = 2  7. Gait with Narrow SYLVIA = 0  8. Gait with eyes closed = 1  9. Ambulating Backwards = 2  10. Steps = 1    Score 16/30     Gait Assessment:(if indicated)  - AD used: None  - Assistance: None  - Distance: at least 200 ft from waiting room to therapy gym    GAIT DEVIATIONS:  Chano displays the following deviations with ambulation: decreased allyson, decrease step length bilaterally, slightly wider SYLVIA, and foot flat contact at IC bilaterally  Impairments contributing to deviations: peripheral neuropathy, dizziness, and feelings of imbalance    POSITIONAL CANAL TESTING    [place] Hallpike (posterior / CL anterior)   Right: (+), fatiguing nystagmus with a mild rotary component, increased dizziness that dissipated within 30 seconds. Increased dizziness with sitting back up. Performed x 2. Decreased intensity and symptom response on second trial.   Left: (+), fatiguing nystagmus with a mild rotary component, increased dizziness that dissipated within 30 seconds. Increased dizziness with sitting back up. Performed x 2. No nystagmus present, but slight dizziness reported on second trial.  Horizontal Canals   Right: N/T   Left: N/T  Treatment Performed: N/A    Functional Mobility (Bed mobility, transfers)  Bed mobility: I  Supine to sit: I  Sit to supine:  I  Transfers to bed: I  Transfers to toilet: I  Sit to stand:  I  Stand pivot:  I  Car transfers: I  Wheelchair mobility: N/A    ADL's:  Feeding: I  Grooming: I  Hygiene: I  UB Dressing: I  LB Dressing: I  Toileting: I  Bathing: I    Functional Limitations Reports - G Codes  Category:  Mobility  Tool:  FGA  Score: 16/30 (47% impaired)  Current: CK at least 40% < 60% impaired, limited or restricted  Goal: CJ at least 20% < 40% impaired, limited or restricted    Education provided re:role of PT, goals for PT, scheduling - pt verbalized understanding. Discussed insurance limitations with pt.     Chano verbalized good understanding of education provided.   Pt has no cultural, educational or language barriers to learning provided.      Assessment   This is a 78 y.o. male referred to outpatient physical therapy and presents with a medical diagnosis of vertigo and imbalance. Patient presents with complaints of dizziness, nausea, and imbalance. Upon evaluation and examination patient demonstrated a negative VAS bilaterally and normal BLE strength and AROM. Pt also demonstrated impaired saccades and VOR. With saccadic movements the patient experienced increased dizziness as well as fatigue. Additionally, the patient demonstrated some mild slippage and increased dizziness while performing VOR. Patient also demonstrates some limited cervical ROM which is not abnormal for his age and does not appear to be the cause of his dizziness and imbalance. On GST, patient failed conditions 3-6 as well as rotated approximately 90 degrees to the left when performing the fakuda. Patient had the most difficulty on GST that involved NBOS or distorted proprioception. The combination of either NBOS and distorted proprioception and eyes closed significantly decreases the patient's ability to balance as evident by the 1 and 2 second scores. The approximately 90 degrees of rotation to the left when performing the fakuda is potentially indicative of   a left vestibular hypofunction, but it is unclear if the hypofunction is peripheral, central, or both. After testing static balance with the GST, dynamic balance was tested using the FGA. The patient scored a 16/30 (53%) which scores the patient as a fall risk. Additionally, Mr. Madden demonstrated increased difficulty on sections of the FGA that involved NBOS/tandem walking and ambulation with eyes closed. Lastly, the [place] Hallpike testing was positive bilaterally. Both R and L tests had an increase in symptoms and fatiguing nystagmus. But when performed a second time, the patient experienced a decrease in the intensity and duration of symptoms bilaterally. This response to 2 trials is indicative of habituation and is consistent with a potential central and/or peripheral vestibulopathy. All of the deficits discussed above contribute to the patient's dizziness, nausea, balance deficit, decreased oculomotor endurance, and gait deviations. Pt performance during vestibular evaluation is consistent with a potential central and/or peripheral vestibulopathy. At this time, patient is scheduled to undergo VNG testing tomorrow and PT will follow-up on results. PT is warranted to discuss the deficits listed above and decrease the patient's fall risk.    PT/PTA met face to face to discuss pt's treatment plan and established goals. Pt will be seen by physical therapy every 6th visit and minimally once per month.    Pt rehab potential is Good. Pt will benefit from continuing skilled outpatient physical therapy to address the deficits listed below in the problem list, provide pt/family education and to maximize pt's level of independence in the home and community environment.     History  Co-morbidities and personal factors that may impact the plan of care Examination  Body Structures and Functions, activity limitations and participation restrictions that may impact the plan of care    Clinical Presentation   Co-morbidities:    advanced age, HTN and transportation assistance required    Ataxia, peripheral neuropathy, abnormal eye movements, vertigo, fatigue, imbalance, hypertension, Cataract removal    Personal Factors:   age Body Regions:   head  neck  trunk    Body Systems:    gross symmetry  ROM  gross coordinated movement  balance  gait  transfers  motor control  vestibular function            Participation Restrictions:   Driving to therapy, decreased safety in community settings, no designated HEP at this time     Activity limitations:   Learning and applying knowledge  no deficits    General Tasks and Commands  no deficits    Communication  no deficits    Mobility  walking  driving (bike, car, motorcycle)    Self care  no deficits    Domestic Life  no deficits    Interactions/Relationships  no deficits    Life Areas  no deficits    Community and Social Life  community life  recreation and leisure         evolving clinical presentation with changing clinical characteristics                      moderate   high  moderate Decision Making/ Complexity Score:  moderate         Pt's spiritual, cultural and educational needs considered and pt agreeable to plan of care and goals as stated below:     GOALS:   Short term goals: 4 weeks, pt agrees to goals set.  1. Pt will verbalize understanding and independence with HEP.  2. Pt will perform saccades at 80 bpm without elicitation of increased dizziness or oculomotor fatigue.  3. Pt will perform VOR exercises with less than 20% visual slippage without provocation of increased dizziness.  4. Pt will score 25 secs on GST Condition 3 in order to improve functional mobility.   5. Pt will score 5 secs on GST Condition 4 in order to improve functional mobility.   6. Pt will score 18 secs on GST Condition 5 in order to improve functional mobility.   7. Pt will score 4 secs on GST Condition 6 in order to improve functional mobility.   8. Pt will score no more than 60 degrees of rotation either  direction on GST Condition 7 in order to demonstrate improved compensation of vestibular system.  9. Pt will score a 19/30 on the FGA in order to demonstrate increased dynamic balance and functional mobility.  10. Pt to report at least 50% improvement in concordant symptoms for improved activity participation    Long term goals: 8 weeks, pt agrees to goals set  11. Pt will perform saccades at 90 bpm  without elicitation of increased dizziness or oculomotor fatigue.  12. Pt will perform VOR exercises with less than 10% visual slippage without provocation of increased dizziness.  13. Pt will score 30 (P) secs on GST Condition 3 in order to improve functional mobility.   14. Pt will score 8 secs on GST Condition 4 in order to improve functional mobility.   15. Pt will score 23 secs on GST Condition 5 in order to improve functional mobility.   16. Pt will score 7 secs on GST Condition 6 in order to improve functional mobility.   17. Pt will score 45 degrees of rotation on the L on GST Condition 7 in order to demonstrate improved compensation of vestibular system.  18. Pt will score a 23/30 on the FGA in order to demonstrate increased dynamic balance and functional mobility and decreased fall risk  19. Pt to report at least 75% improvement in concordant symptoms for improved activity participation      Plan   Outpatient physical therapy 2 times weekly to include: Pt Education, HEP, therapeutic exercises, neuromuscular re-education, therapeutic activities, manual therapy, joint mobilizations, aquatic therapy and modalities PRN to achieve established goals. Pt may be seen by PTA as part of the rehabilitation team.     Cont PT for  8 weeks.     Vickie Nunez, PT  01/08/2019

## 2019-01-09 ENCOUNTER — CLINICAL SUPPORT (OUTPATIENT)
Dept: AUDIOLOGY | Facility: CLINIC | Age: 79
End: 2019-01-09
Payer: MEDICARE

## 2019-01-09 DIAGNOSIS — R26.89 IMBALANCE: ICD-10-CM

## 2019-01-09 DIAGNOSIS — H81.93 VESTIBULOPATHY, BILATERAL: Primary | ICD-10-CM

## 2019-01-09 DIAGNOSIS — R42 VERTIGO: ICD-10-CM

## 2019-01-09 PROCEDURE — 92548 PR POSTUROGRAPHY, COMPUTER/DYNAMIC, SENS ORG TEST, 6 COND: ICD-10-PCS | Mod: S$GLB,,, | Performed by: AUDIOLOGIST

## 2019-01-09 PROCEDURE — 92548 CDP-SOT 6 COND W/I&R: CPT | Mod: S$GLB,,, | Performed by: AUDIOLOGIST

## 2019-01-09 PROCEDURE — 92537 PR CALORIC VSTBLR TEST W/REC BITHERMAL: ICD-10-PCS | Mod: S$GLB,,, | Performed by: AUDIOLOGIST

## 2019-01-09 PROCEDURE — 92540 PR VESTIBULAR EVAL NYSTAG FOVL&PERPH STIM OSCIL TRACKING: ICD-10-PCS | Mod: S$GLB,,, | Performed by: AUDIOLOGIST

## 2019-01-09 PROCEDURE — 92537 CALORIC VSTBLR TEST W/REC: CPT | Mod: S$GLB,,, | Performed by: AUDIOLOGIST

## 2019-01-09 PROCEDURE — 92540 BASIC VESTIBULAR EVALUATION: CPT | Mod: S$GLB,,, | Performed by: AUDIOLOGIST

## 2019-01-09 NOTE — PROGRESS NOTES
"    VNG/Posturography Evaluation    Referring physician:  Dr. Marks    78 y.o. male complains of vertigo, dizziness, lightheadedness, imbalance and headache. Symptoms are provoked by quick head turns, bending over and standing up quickly. The patient notes that he tends to notice his symptoms in the evening and while in bed at night. Symptoms have been occurring "constantly" for approximately 2 years but have been particularly noticeably over the past year. The patient denies that he has had any falls.      Gaze nystagmus was absent.  Oculomotor function was abnormal: borderline slow saccadic velocities.   Spontaneous nystagmus was absent.  The head-hanging left Hallpike revealed <clinically significant, non-fatiguing, oblique> nystagmus: 9 d/s right-beating + 7 d/s down-beating in the supine position.  The head-hanging right Hallpike revealed <clinically insignificant, oblique> nystagmus: non-fatiguing 5 d/s left-beating + 4 d/s up-beating in the supine position.  Static positional testing revealed <clinically significant> nystagmus: 7 d/s left-beating in the head right position, 7 d/s right-beating in the head center position and 14 d/s right-beating in the head left position.  Unilateral weakness: 5% (left)  Directional preponderance 5% (right-beating)  RC: 6 d/s   d/s  RW: 4 d/s  LW: 3 d/s  Fixation suppression was normal.  Sensory Organization Test indicated poor use of vestibular cues in maintaining balance.    Motor Control Test was normal.     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 The significance of borderline abnormal saccadic velocities in isolation is unknown. Posturography findings suggest poor use of vestibular cues in maintaining balance.     Recommend: 1. A trial with [place] exercises. A copy of these exercises was provided for the patient at today's appointment. 2. Consider a formal Risk of Falls assessment and formal " vestibular/balance rehab. 3. Follow-up with neurologist due to presence of down-beating nystagmus.

## 2019-01-09 NOTE — Clinical Note
Dr. Marks, Please find your patient's VNG and Posturography findings attached. Please let me know if I can provide any additional information. Thanks, Shahrzad Rivera, CCC-A

## 2019-01-10 DIAGNOSIS — H81.93 DISORDER OF VESTIBULAR FUNCTION OF BOTH EARS: Primary | ICD-10-CM

## 2019-01-10 RX ORDER — ACETAMINOPHEN, DEXTROMETHORPHAN HBR, DOXYLAMINE SUCCINATE, PHENYLEPHRINE HCL 650; 20; 12.5; 1 MG/30ML; MG/30ML; MG/30ML; MG/30ML
1000 SOLUTION ORAL DAILY
Refills: 0 | COMMUNITY
Start: 2019-01-10

## 2019-01-15 ENCOUNTER — CLINICAL SUPPORT (OUTPATIENT)
Dept: REHABILITATION | Facility: HOSPITAL | Age: 79
End: 2019-01-15
Attending: STUDENT IN AN ORGANIZED HEALTH CARE EDUCATION/TRAINING PROGRAM
Payer: MEDICARE

## 2019-01-15 DIAGNOSIS — Z74.09 IMPAIRED FUNCTIONAL MOBILITY, BALANCE, GAIT, AND ENDURANCE: ICD-10-CM

## 2019-01-15 DIAGNOSIS — H81.90 VESTIBULAR DIZZINESS: ICD-10-CM

## 2019-01-15 PROCEDURE — 97112 NEUROMUSCULAR REEDUCATION: CPT | Mod: PO

## 2019-01-15 NOTE — PROGRESS NOTES
"  Physical Therapy Daily Treatment Note     Name: Chano Guillen  Clinic Number: [id number]    Therapy Diagnosis:   Encounter Diagnoses   Name Primary?    Impaired functional mobility, balance, gait, and endurance     Vestibular dizziness      Physician: Karina Marks MD    Visit Date: 1/15/2019    Physician Orders: Eval and Treat  Medical Diagnosis: Vertigo and Imbalance  Evaluation Date: 01/08/2019  Authorization Period Expiration: 12/31/2019  Plan of Care Certification Period: 01/08/2019 to 03/05/19  Visit #/Visits authorized: 2/ 20 (2 total visits of 2019)    Time In: 1310  Time Out: 1355  Total Billable Time: 45 minutes    Precautions: Standard and Fall    Subjective     Pt reports:  Some dizziness.  HEP compliance status: HEP to be provided today.  Response to previous treatment: Pt tolerated evaluation well.  Functional change: None at this time secondary to today being the first treatment session.    Pain: 0/10  Location: N/A    Objective     Chano participated in neuromuscular re-education activities to improve: Balance and oculomotor coordination, speed, and endurance for 45 minutes. The following activities were included:    Oculomotor exercises to improve oculomotor symmetry, speed, and endurance while seated.     Saccades: Mildly busy environment    1 x 30" horizontal saccades with "A" and "B" targets held at arms length. Self selected speed. Symmetrical eye movements, no slippage noted. No increase in symptoms.    1 x 30" horizontal saccades with "A" and "B" targets held at arms length. 60 bpm, symmetrical eye movements no slippage noted. No increase in symptoms.    1 x 30" horizontal saccades with "A" and "B" targets held at arms length. 80 bpm symmetrical eye movements, but mild increase in dizziness, mild beating at right end range.   VOR: Self-selected speed, mildly busy environment    3 x 30" horizontal VOR with single target held at arms length.     Trial 1: no increase in dizziness already " "present. Slight slippage present.     Trial 2: mild increase in dizziness. Slippage in both direction 100% of the time. Pt performed exercise in a greater ROM than trial 1 and trial 3.     Trial 3: no increase in dizziness already present. Slight slippage present.   Parallel Bars:   Firm: SBA    1 x 30" NBOS, EC. Mild increase in dizziness. Min sway.    2 x 30" Static tandem stance RLE in front, EO. Occasional minor LOBs balance regained with 1 UE touchdown support. Min sway. LOBs primarily to the R.    2 x 30" Static tandem stance LLE in front, EO. No LOB. Normal sway.   Foam: SBA unless otherwise noted.    1 x 30" NBOS, EO. Normal sway, No LOB    3 x 30" NBOS, EC. occasionaly LOBs with 1 UE touchdown support and Feng from PT. Increased dizziness with eyes closed.    2 x 30" NBOS, EO, horizontal (R <> L) head turns. No LOB. Mod sway. Mild increase in dizziness.    2 x 30" NBOS, EO,vertical (up <> down) head turns. No LOB, min-mod sway. No increase in overall dizziness, but patient feels like he is starting to get a headache.    1 x 30" NBOS, EO, alternating ball push outs and ups. No increase in overall dizziness. No LOB. Normal sway.   Dynamic Balance:    X 4 laps of gait with EC. No LOB, mild veering. Some stoppages to regain balance before continuing on.    X 4 laps on tandem/heel-to-toe walking.     Lap 1: RUE 1 finger touchdown support. No LOB.      Lap 2-4: No UE touchdown support. Frequent LOBs with stepping strategy used to regain balance.    Habituation: SBA   x 1 laps x 100 ft ambulating in closed hallway with vertical head turns. Min veering. Min LOBs with stepping strategy used.   x 1 laps x 100 ft ambulating in closed hallway with horizontal head turns. Mod veering. Several minor LOBs with balance regained using stepping strategy. Slight increase in HA.     Home Exercises Provided and Patient Education Provided     Education provided: Seated horizontal saccades, and Seated horizontal VOR x " "1.    Written Home Exercises Provided: yes.  Exercises were reviewed and Chano was able to demonstrate them prior to the end of the session.  Chano demonstrated fair  understanding of the education provided.     See EMR under Media for exercises provided 1/15/2019.    Assessment     Pt tolerated therapy well this afternoon. He did have dizziness upon arrival, but it is not increased or different from his current "normal." Pt continues to have difficulty with NBOS and EC activities in both static and dynamic balance. Pt has increased unsteadiness and occasional dizziness with head turns, horizontal > vertical. At this time, patient would benefit from continued skilled PT intervention.    Chano is progressing well towards his goals.   Pt prognosis is Good.     Pt will continue to benefit from skilled outpatient physical therapy to address the deficits listed in the problem list box on initial evaluation, provide pt/family education and to maximize pt's level of independence in the home and community environment.     Pt's spiritual, cultural and educational needs considered and pt agreeable to plan of care and goals.     Anticipated barriers to physical therapy: Age and past medical history    Goals:   Short term goals: 4 weeks, pt agrees to goals set.  1. Pt will verbalize understanding and independence with HEP. ON GOING  2. Pt will perform saccades at 80 bpm without elicitation of increased dizziness or oculomotor fatigue. ON GOING  3. Pt will perform VOR exercises with less than 20% visual slippage without provocation of increased dizziness. ON GOING  4. Pt will score 25 secs on GST Condition 3 in order to improve functional mobility. ON GOING  5. Pt will score 5 secs on GST Condition 4 in order to improve functional mobility. ON GOING  6. Pt will score 18 secs on GST Condition 5 in order to improve functional mobility. ON GOING  7. Pt will score 4 secs on GST Condition 6 in order to improve functional mobility. ON " GOING  8. Pt will score no more than 60 degrees of rotation either direction on GST Condition 7 in order to demonstrate improved compensation of vestibular system. ON GOING  9. Pt will score a 19/30 on the FGA in order to demonstrate increased dynamic balance and functional mobility. ON GOING  10. Pt to report at least 50% improvement in concordant symptoms for improved activity participation. ON GOING     Long term goals: 8 weeks, pt agrees to goals set  11. Pt will perform saccades at 90 bpm  without elicitation of increased dizziness or oculomotor fatigue. ON GOING  12. Pt will perform VOR exercises with less than 10% visual slippage without provocation of increased dizziness. ON GOING  13. Pt will score 30 (P) secs on GST Condition 3 in order to improve functional mobility. ON GOING  14. Pt will score 8 secs on GST Condition 4 in order to improve functional mobility. ON GOING  15. Pt will score 23 secs on GST Condition 5 in order to improve functional mobility. ON GOING  16. Pt will score 7 secs on GST Condition 6 in order to improve functional mobility. ON GOING  17. Pt will score 45 degrees of rotation on the L on GST Condition 7 in order to demonstrate improved compensation of vestibular system. ON GOING  18. Pt will score a 23/30 on the FGA in order to demonstrate increased dynamic balance and functional mobility and decreased fall risk. ON GOING  19. Pt to report at least 75% improvement in concordant symptoms for improved activity participation. ON GOING      Plan   Plan for Next Session: Continue to progress static and dynamic balances with NBOS, eyes closed, and combinations of the two. PT to also continue habituation oculomotor exercises.    Outpatient physical therapy 2 times weekly during POC period from 01/08/2019 to 03/05/19. Physical Therapy treatment to include: Pt Education, HEP, therapeutic exercises, neuromuscular re-education, therapeutic activities, manual therapy, joint mobilizations,  aquatic therapy and modalities PRN to achieve established goals. Pt may be seen by PTA as part of the rehabilitation team.      I certify that I was present in the room directing the student in service delivery and guiding them using my skilled judgment. As the co-signing therapist I have reviewed the students documentation and am responsible for the treatment, assessment, and plan.     Vickie Nunez, PT     Diamante Christiansen, SPT   01/15/2019

## 2019-01-17 ENCOUNTER — CLINICAL SUPPORT (OUTPATIENT)
Dept: REHABILITATION | Facility: HOSPITAL | Age: 79
End: 2019-01-17
Attending: STUDENT IN AN ORGANIZED HEALTH CARE EDUCATION/TRAINING PROGRAM
Payer: MEDICARE

## 2019-01-17 DIAGNOSIS — Z74.09 IMPAIRED FUNCTIONAL MOBILITY, BALANCE, GAIT, AND ENDURANCE: ICD-10-CM

## 2019-01-17 DIAGNOSIS — H81.90 VESTIBULAR DIZZINESS: ICD-10-CM

## 2019-01-17 PROCEDURE — 97112 NEUROMUSCULAR REEDUCATION: CPT | Mod: PO

## 2019-01-17 NOTE — PROGRESS NOTES
"  Physical Therapy Daily Treatment Note     Name: Chano Guillen  Clinic Number: [id number]    Therapy Diagnosis:   Encounter Diagnoses   Name Primary?    Impaired functional mobility, balance, gait, and endurance     Vestibular dizziness      Physician: Karina Marks MD    Visit Date: 1/17/2019    Physician Orders: Eval and Treat  Medical Diagnosis: Vertigo and Imbalance  Evaluation Date: 01/08/2019  Authorization Period Expiration: 12/31/2019  Plan of Care Certification Period: 01/08/2019 to 03/05/19  Visit #/Visits authorized: 3/ 20 (3 total visits of 2019)    Time In: 13:00  Time Out: 13:45  Total Billable Time: 45 minutes    Precautions: Standard and Fall    Subjective     Pt reports:  Some dizziness.  HEP compliance status: Pt reports compliance with HEP.   Response to previous treatment: Pt tolerated evaluation well.  Functional change: None at this time.     Pain: 0/10  Location: N/A    Objective     Chano participated in neuromuscular re-education activities to improve: Balance and oculomotor coordination, speed, and endurance for 45 minutes. The following activities were included:    Oculomotor exercises to improve oculomotor symmetry, speed, and endurance while seated.     Saccades: Mildly busy environment    1 x 30" horizontal saccades with "A" and "B" targets held at arms length. 80 bpm. Symmetrical eye movements, no slippage noted. No increase in symptoms.    1 x 30" horizontal saccades with "A" and "B" targets held at arms length. 90 bpm, symmetrical eye movements no slippage noted. No increase in symptoms. Mild beating at right end range    1 x 30" horizontal saccades with "A" and "B" targets held at arms length. 90 bpm symmetrical eye movements, no increase in dizziness, mild beating at right end range.      VOR: Self-selected speed, mildly busy environment    3 x 30" horizontal VOR with single target held at arms length.     Trial 1: mild increase in dizziness already present. Mod slippage " "present.     Trial 2: No increase in dizziness. Slippage in both direction 100% of the time. Pt performed exercise in a greater ROM than trial 1 and trial 3.     Trial 3: no increase in dizziness already present. Slight slippage present. Pt performed with increased speed and smaller ROM     Trial 4: Slippage in both direction 100% of the time. Pt instructed to perform exercise in a greater ROM. Mild increase in dizziness.  Parallel Bars:   Firm: SBA    1 x 30" NBOS, EC. Normal sway. No increase in dizziness. No LOB    1 x 30" Static tandem stance RLE in front, EO. Normal sway. No LOB    2 x 30" Static tandem stance LLE in front, EO. No LOB. Normal sway.   Foam: SBA unless otherwise noted.    3 x 30" normal stance, EC. occasional LOBs with 1 UE touchdown support and Feng from PT. No increase in dizziness. (Trial 3: no LOB)    2 x 30" normal stance, EO, horizontal (R <> L) head turns. No LOB. Min sway. No increase in dizziness.    2 x 30" normal stance, EO, vertical (up <> down) head turns. No LOB, min-mod sway.Slight increase in dizziness.    1 x 30" normal stance, EO, alternating ball push outs and ups. No increase in overall dizziness. No LOB. Normal sway.    2 x 30" NBOS, EC.      Trial 1: occasional LOBs with 1 UE touchdown support.     Trial 2: no LOB, mild increase in dizziness   Dynamic Balance: SBA    X 4 laps of gait with EC. No LOB, mild veering. No stoppages to regain balance. Slow speed with slight unsteadiness.    X 4 laps on tandem/heel-to-toe walking. No UE touchdown support. Minimal LOBs with stepping strategy used to regain balance.    Habituation: SBA   x 2 laps x 100 ft ambulating in closed hallway with vertical head turns. Min veering. Unsteadiness, min LOBs with balance regained using stepping strategy.   x 2 laps x 100 ft ambulating in closed hallway with horizontal head turns. Mod veering. Min LOBs with balance regained using stepping strategy. Slight increase in dizziness.   Pt stands at " "counter and transfers 10 cones from front counter to counter on L with emphasis on turning body; x 2 trials, pt reports mild dizziness after each trial, brief standing rest break required   Pt stands at counter and transfers 10 cones from front counter to counter on R with emphasis on turning body; x 2 trials, pt reports mild dizziness after each trial, brief standing rest break required    Home Exercises Provided and Patient Education Provided     Education provided: Seated horizontal saccades, and Seated horizontal VOR x 1.    Written Home Exercises Provided: yes.  Exercises were reviewed and Chano was able to demonstrate them prior to the end of the session.  Chano demonstrated good  understanding of the education provided.     See EMR under Media for exercises provided 1/15/2019.    Assessment     Pt tolerated therapy well this afternoon. He did have dizziness upon arrival, but it is not increased or different from his current "normal." Pt did demonstrate improve steadiness and gait speed while ambulating down [place] to PT gym. Dynamic balance was improved today in comparison to the previous session, but pt does continue to have difficulty with NBOS and EC activities in both static and dynamic balance. Pt would continue to benefit from skilled PT intervention for the problems listed above.    Chano is progressing well towards his goals.   Pt prognosis is Good.     Pt will continue to benefit from skilled outpatient physical therapy to address the deficits listed in the problem list box on initial evaluation, provide pt/family education and to maximize pt's level of independence in the home and community environment.     Pt's spiritual, cultural and educational needs considered and pt agreeable to plan of care and goals.     Anticipated barriers to physical therapy: Age and past medical history    Goals:   Short term goals: 4 weeks, pt agrees to goals set.  1. Pt will verbalize understanding and independence with HEP. ON " GOING  2. Pt will perform saccades at 80 bpm without elicitation of increased dizziness or oculomotor fatigue. ON GOING  3. Pt will perform VOR exercises with less than 20% visual slippage without provocation of increased dizziness. ON GOING  4. Pt will score 25 secs on GST Condition 3 in order to improve functional mobility. ON GOING  5. Pt will score 5 secs on GST Condition 4 in order to improve functional mobility. ON GOING  6. Pt will score 18 secs on GST Condition 5 in order to improve functional mobility. ON GOING  7. Pt will score 4 secs on GST Condition 6 in order to improve functional mobility. ON GOING  8. Pt will score no more than 60 degrees of rotation either direction on GST Condition 7 in order to demonstrate improved compensation of vestibular system. ON GOING  9. Pt will score a 19/30 on the FGA in order to demonstrate increased dynamic balance and functional mobility. ON GOING  10. Pt to report at least 50% improvement in concordant symptoms for improved activity participation. ON GOING     Long term goals: 8 weeks, pt agrees to goals set  11. Pt will perform saccades at 90 bpm  without elicitation of increased dizziness or oculomotor fatigue. ON GOING  12. Pt will perform VOR exercises with less than 10% visual slippage without provocation of increased dizziness. ON GOING  13. Pt will score 30 (P) secs on GST Condition 3 in order to improve functional mobility. ON GOING  14. Pt will score 8 secs on GST Condition 4 in order to improve functional mobility. ON GOING  15. Pt will score 23 secs on GST Condition 5 in order to improve functional mobility. ON GOING  16. Pt will score 7 secs on GST Condition 6 in order to improve functional mobility. ON GOING  17. Pt will score 45 degrees of rotation on the L on GST Condition 7 in order to demonstrate improved compensation of vestibular system. ON GOING  18. Pt will score a 23/30 on the FGA in order to demonstrate increased dynamic balance and functional  mobility and decreased fall risk. ON GOING  19. Pt to report at least 75% improvement in concordant symptoms for improved activity participation. ON GOING      Plan   Plan for Next Session: Continue to progress static and dynamic balances with NBOS, eyes closed, and combinations of the two. PT to also continue habituation and oculomotor exercises.    Outpatient physical therapy 2 times weekly during POC period from 01/08/2019 to 03/05/19. Physical Therapy treatment to include: Pt Education, HEP, therapeutic exercises, neuromuscular re-education, therapeutic activities, manual therapy, joint mobilizations, aquatic therapy and modalities PRN to achieve established goals. Pt may be seen by PTA as part of the rehabilitation team.      I certify that I was present in the room directing the student in service delivery and guiding them using my skilled judgment. As the co-signing therapist I have reviewed the students documentation and am responsible for the treatment, assessment, and plan.     Vickie Nunez, PT     Diamante Christiansen, SPT   01/17/2019

## 2019-01-21 ENCOUNTER — CLINICAL SUPPORT (OUTPATIENT)
Dept: REHABILITATION | Facility: HOSPITAL | Age: 79
End: 2019-01-21
Attending: STUDENT IN AN ORGANIZED HEALTH CARE EDUCATION/TRAINING PROGRAM
Payer: MEDICARE

## 2019-01-21 DIAGNOSIS — Z74.09 IMPAIRED FUNCTIONAL MOBILITY, BALANCE, GAIT, AND ENDURANCE: ICD-10-CM

## 2019-01-21 DIAGNOSIS — H81.90 VESTIBULAR DIZZINESS: ICD-10-CM

## 2019-01-21 PROCEDURE — 97112 NEUROMUSCULAR REEDUCATION: CPT | Mod: PO

## 2019-01-21 NOTE — PROGRESS NOTES
"  Physical Therapy Daily Treatment Note     Name: Chano Guillen  Clinic Number: [id number]    Therapy Diagnosis:   Encounter Diagnoses   Name Primary?    Impaired functional mobility, balance, gait, and endurance     Vestibular dizziness      Physician: Karina Marks MD    Visit Date: 1/21/2019    Physician Orders: Eval and Treat  Medical Diagnosis: Vertigo and Imbalance  Evaluation Date: 01/08/2019  Authorization Period Expiration: 12/31/2019  Plan of Care Certification Period: 01/08/2019 to 03/05/19  Visit #/Visits authorized: 4 / 20 (4 total visits of 2019)    Time In: 13:00  Time Out: 13:45  Total Billable Time: 45 minutes    Precautions: Standard and Fall    Subjective     Pt reports:  Some dizziness and what feels like sinus pressure throughout front of his face.  HEP compliance status: Pt reports compliance with HEP.   Response to previous treatment: Pt tolerated evaluation well.  Functional change: None at this time.     Pain: 0/10  Location: N/A   Dizziness: Unable to rate.  Objective     Chano participated in neuromuscular re-education activities to improve: Balance and oculomotor coordination, speed, and endurance for 45 minutes. The following activities were included:    Oculomotor exercises to improve oculomotor symmetry, speed, and endurance while seated.     Saccades: Mildly busy environment    Trial 1: 30" horizontal saccades with "A" and "B" targets held at arms length. 90 bpm symmetrical eye movements, no increase in dizziness, occasional beating at right end range.    Trial 2: 30" horizontal saccades with "A" and "B" targets held at arms length. 90 bpm symmetrical eye movements, no increase in dizziness, occasional beating at right end range and slight slippage. Occasional loss of metronome beat.    Trial 3: 30" horizontal saccades with "A" and "B" targets held at arms length. 90 bpm symmetrical eye movements, mild increase in dizziness, occasional beating at right end range. No slippage " "noted.     VOR: Self-selected speed, mildly busy environment, VCs to maintain full ROM    3 x 30" horizontal VOR with single target held at arms length.     Trial 1: mild increase in dizziness already present. Mod slippage present. Also feels like an increase in pressure.     Trial 2: No increase in dizziness. Mod slippage noted. VCs from complete ROM.     Trial 3: No increase in dizziness. Mod slippage noted. VCs from complete ROM. Pressure in sinus area feels the same. Mild headache between the eyes.    Parallel Bars:   Firm: SBA    2 x 30" NBOS, EC. No increase in dizziness. No LOB. Trial 1: min sway, Trial 2: normal sway.    1 x 30" Static tandem stance RLE in front, EO. Normal sway. No LOB    1 x 30" Static tandem stance LLE in front, EO. No LOB. Normal sway.   Foam: SBA unless otherwise noted.    2 x 30" NBOS, EO. No increase in dizziness. Normal sway    2 x 30" normal stance, EC. No increase in dizziness, but feels like increased pressure. No LOB. Min Sway.    1 x 30" normal stance, EO, horizontal (R <> L) head turns. No LOB. Min sway. No increase in dizziness.    1 x 30" normal stance, EO, vertical (up <> down) head turns. No LOB, min sway. No increase in dizziness.    3 x 30" NBOS, EC. Occasional LOBs with 1 UE touchdown support to regain balance. Decreased frequency of LOBs as trials progressed and increased time between LOBs. Headache at the end of trial 3.   Dynamic Balance: SBA    X 4 laps of gait with EC. No LOB, mild veering. No stoppages to regain balance. Slow speed with slight unsteadiness.    X 2 laps on tandem/heel-to-toe walking. 1 finger touchdown support. No LOBs with ankle strategy used to maintain balance.    X 2 laps on tandem/heel-to-toe walking. No UE touchdown support. Minimal LOBs with stepping strategy used to regain balance.    Habituation: SBA   x 1 laps x 100 ft ambulating in closed hallway with vertical head turns. Min veering. Unsteadiness, min LOBs with balance regained using " "stepping strategy.   x 1 laps x 100 ft ambulating in closed hallway with horizontal head turns and eyes focused on a stationary target at the end of the hallway. Mod veering. Min LOBs with balance regained using stepping strategy. Slight increase in dizziness.     Home Exercises Provided and Patient Education Provided     Education provided: Seated horizontal saccades, and Seated horizontal VOR x 1.    Written Home Exercises Provided: yes.  Exercises were reviewed and Chano was able to demonstrate them prior to the end of the session.  Cahno demonstrated good  understanding of the education provided.     See EMR under Media for exercises provided 1/15/2019.    Assessment     Pt tolerated therapy well this afternoon. He did have dizziness upon arrival, but it is not increased or different from his current "normal." Both dynamic and static balance was improved today in comparison to the previous session, but pt does continue to have difficulty with NBOS and EC activities. Pt's complaint of increased pressure in or around his sinuses appears to be a contributing factor to patient's disequilibrium. Pt would continue to benefit from skilled PT intervention for the problems listed above.    Chano is progressing well towards his goals.   Pt prognosis is Good.     Pt will continue to benefit from skilled outpatient physical therapy to address the deficits listed in the problem list box on initial evaluation, provide pt/family education and to maximize pt's level of independence in the home and community environment.     Pt's spiritual, cultural and educational needs considered and pt agreeable to plan of care and goals.     Anticipated barriers to physical therapy: Age and past medical history    Goals:   Short term goals: 4 weeks, pt agrees to goals set.  1. Pt will verbalize understanding and independence with HEP. ON GOING  2. Pt will perform saccades at 80 bpm without elicitation of increased dizziness or oculomotor fatigue. " ON GOING  3. Pt will perform VOR exercises with less than 20% visual slippage without provocation of increased dizziness. ON GOING  4. Pt will score 25 secs on GST Condition 3 in order to improve functional mobility. ON GOING  5. Pt will score 5 secs on GST Condition 4 in order to improve functional mobility. ON GOING  6. Pt will score 18 secs on GST Condition 5 in order to improve functional mobility. ON GOING  7. Pt will score 4 secs on GST Condition 6 in order to improve functional mobility. ON GOING  8. Pt will score no more than 60 degrees of rotation either direction on GST Condition 7 in order to demonstrate improved compensation of vestibular system. ON GOING  9. Pt will score a 19/30 on the FGA in order to demonstrate increased dynamic balance and functional mobility. ON GOING  10. Pt to report at least 50% improvement in concordant symptoms for improved activity participation. ON GOING     Long term goals: 8 weeks, pt agrees to goals set  11. Pt will perform saccades at 90 bpm  without elicitation of increased dizziness or oculomotor fatigue. ON GOING  12. Pt will perform VOR exercises with less than 10% visual slippage without provocation of increased dizziness. ON GOING  13. Pt will score 30 (P) secs on GST Condition 3 in order to improve functional mobility. ON GOING  14. Pt will score 8 secs on GST Condition 4 in order to improve functional mobility. ON GOING  15. Pt will score 23 secs on GST Condition 5 in order to improve functional mobility. ON GOING  16. Pt will score 7 secs on GST Condition 6 in order to improve functional mobility. ON GOING  17. Pt will score 45 degrees of rotation on the L on GST Condition 7 in order to demonstrate improved compensation of vestibular system. ON GOING  18. Pt will score a 23/30 on the FGA in order to demonstrate increased dynamic balance and functional mobility and decreased fall risk. ON GOING  19. Pt to report at least 75% improvement in concordant symptoms for  improved activity participation. ON GOING      Plan   Outpatient physical therapy 2 times weekly during POC period from 01/08/2019 to 03/05/19. Physical Therapy treatment to include: Pt Education, HEP, therapeutic exercises, neuromuscular re-education, therapeutic activities, manual therapy, joint mobilizations, aquatic therapy and modalities PRN to achieve established goals. Pt may be seen by PTA as part of the rehabilitation team.      I certify that I was present in the room directing the student in service delivery and guiding them using my skilled judgment. As the co-signing therapist I have reviewed the students documentation and am responsible for the treatment, assessment, and plan.     Vickie Nunez, PT     Diamante Christiansen, SPT   01/21/2019

## 2019-01-25 ENCOUNTER — CLINICAL SUPPORT (OUTPATIENT)
Dept: REHABILITATION | Facility: HOSPITAL | Age: 79
End: 2019-01-25
Attending: STUDENT IN AN ORGANIZED HEALTH CARE EDUCATION/TRAINING PROGRAM
Payer: MEDICARE

## 2019-01-25 DIAGNOSIS — H81.90 VESTIBULAR DIZZINESS: ICD-10-CM

## 2019-01-25 DIAGNOSIS — Z74.09 IMPAIRED FUNCTIONAL MOBILITY, BALANCE, GAIT, AND ENDURANCE: ICD-10-CM

## 2019-01-25 PROCEDURE — 97112 NEUROMUSCULAR REEDUCATION: CPT | Mod: PO

## 2019-01-25 NOTE — PROGRESS NOTES
"  Physical Therapy Daily Treatment Note     Name: Chano Guillen  Clinic Number: [id number]    Therapy Diagnosis:   Encounter Diagnoses   Name Primary?    Impaired functional mobility, balance, gait, and endurance     Vestibular dizziness      Physician: Karina Marks MD    Visit Date: 1/25/2019    Physician Orders: Eval and Treat  Medical Diagnosis: Vertigo and Imbalance  Evaluation Date: 01/08/2019  Authorization Period Expiration: 12/31/2019  Plan of Care Certification Period: 01/08/2019 to 03/05/19  Visit #/Visits authorized: 5 / 20 (4 total visits of 2019)    Time In: 13:45  Time Out: 14:30  Total Billable Time: 45 minutes    Precautions: Standard and Fall     G codes 5/10     On ST caseload?no         Subjective     Pt reports:  That his dizziness feels worse due to increased feeling of pressure throughout maxillary and frontal sinus regions.  HEP compliance status: Pt reports compliance with HEP.   Response to previous treatment: Pt tolerated prior session well.   Functional change: None at this time.     Pain: 0/10  Location: N/A   Dizziness: pt reports slight increase in baseline symptoms compared to prior sessions  Objective     Chano participated in neuromuscular re-education activities to improve: Balance and oculomotor coordination, speed, and endurance for 45 minutes. The following activities were included:    Oculomotor exercises to improve oculomotor symmetry, speed, and endurance while seated.     Saccades: Mildly busy environment, with "A" and "B" targets held at arms length    2 x 30" horizontal at 100 bpm, no slippage noted, no increase in symptoms   VOR: Self-selected speed, mildly busy environment, VCs to maintain full ROM    3 x 30" horizontal VOR while reading black words on index card at arms length.     Trial 1: no increase in baseline dizziness . Mod slippage present.      Trial 2: No increase in dizziness. Mod slippage noted. VCs from complete ROM.     Trial 3: Mild increase in " "dizziness. Mod slippage noted. VCs from complete ROM.    Smooth pursuit:  Self-selected speed, mildly busy environment, VCs to maintain static head    2 x 30", horizontal, reading black words on index card; no increase in dizziness, slight slippage noted    Parallel Bars:   Foam pad: no UE support    3 x 60" static stance with EC, CGA to occ Min A    2 x 30" R<>L head turns, CGA to occ Min A    2 x 30" up <> down head turns, CGA to occ Min A  X 2 laps tandem ambulation in // bars, no UE support, CGA to SBA  X 4 laps alternating marching in // bars, 3" hold, occ touchdown support, SBA to occ Min A        Habituation: SBA   X 2 trials: pt reaches across midline with RUE to retrieve cones placed on L side on mat > he then bends forward and places one cones at a time on the floor > the then bends forward and retrieves each cone and replaces it back on the mat   X 2 trials: pt reaches across midline with LUE to retrieve cones placed on  side on mat > he then bends forward and places one cones at a time on the floor > the then bends forward and retrieves each cone and replaces it back on the mat    No increase in baseline symptoms throughout   2 trials X 5 laps figure 8 pattern around 2 large cones placed ~15 feet apart, able to increase speed on 2nd trial, SBA, mild increase in dizziness which improved with standing rest break     Home Exercises Provided and Patient Education Provided     Education provided: Seated horizontal saccades, and Seated horizontal VOR x 1.    Written Home Exercises Provided: yes.  Exercises were reviewed and Chano was able to demonstrate them prior to the end of the session.  Chano demonstrated good  understanding of the education provided.     See EMR under Media for exercises provided 1/15/2019.    Assessment   Pt tolerated therapy well this afternoon despite reports of increased pressure throughout the front of his face which appears to be attributed to sinus pressure. Able to increase " difficulty of VOR exercise from single target to reading black print on index card. Pt did demonstrate increased slippage with this more difficult task. Also, able to progress speed of saccades without difficulty. Added smooth pursuit with reading index card to test pt's tolerance with reading and tracking. No significant symptom provocation with smooth pursuit. Able to progress difficulty of dynamic balance activities today as well to incorporate single limb balance. Pt required touchdown support during this activity to maintain balance. Also progressed difficulty of habituation exercises. However, no significant increase in symptom provocation throughout these activities. Pt may benefit from follow up with MD team regarding sinus pressure. Cont. PT POC.       Chano is progressing well towards his goals.   Pt prognosis is Good.     Pt will continue to benefit from skilled outpatient physical therapy to address the deficits listed in the problem list box on initial evaluation, provide pt/family education and to maximize pt's level of independence in the home and community environment.     Pt's spiritual, cultural and educational needs considered and pt agreeable to plan of care and goals.     Anticipated barriers to physical therapy: Age and past medical history    Goals:   Short term goals: 4 weeks, pt agrees to goals set.  1. Pt will verbalize understanding and independence with HEP. MET 01/25/19  2. Pt will perform saccades at 80 bpm without elicitation of increased dizziness or oculomotor fatigue. MET 01/25/19  3. Pt will perform VOR exercises with less than 20% visual slippage without provocation of increased dizziness. ON GOING  4. Pt will score 25 secs on GST Condition 3 in order to improve functional mobility. ON GOING  5. Pt will score 5 secs on GST Condition 4 in order to improve functional mobility. ON GOING  6. Pt will score 18 secs on GST Condition 5 in order to improve functional mobility. ON  GOING  7. Pt will score 4 secs on GST Condition 6 in order to improve functional mobility. ON GOING  8. Pt will score no more than 60 degrees of rotation either direction on GST Condition 7 in order to demonstrate improved compensation of vestibular system. ON GOING  9. Pt will score a 19/30 on the FGA in order to demonstrate increased dynamic balance and functional mobility. ON GOING  10. Pt to report at least 50% improvement in concordant symptoms for improved activity participation. ON GOING     Long term goals: 8 weeks, pt agrees to goals set  11. Pt will perform saccades at 90 bpm  without elicitation of increased dizziness or oculomotor fatigue. ON GOING  12. Pt will perform VOR exercises with less than 10% visual slippage without provocation of increased dizziness. ON GOING  13. Pt will score 30 (P) secs on GST Condition 3 in order to improve functional mobility. ON GOING  14. Pt will score 8 secs on GST Condition 4 in order to improve functional mobility. ON GOING  15. Pt will score 23 secs on GST Condition 5 in order to improve functional mobility. ON GOING  16. Pt will score 7 secs on GST Condition 6 in order to improve functional mobility. ON GOING  17. Pt will score 45 degrees of rotation on the L on GST Condition 7 in order to demonstrate improved compensation of vestibular system. ON GOING  18. Pt will score a 23/30 on the FGA in order to demonstrate increased dynamic balance and functional mobility and decreased fall risk. ON GOING  19. Pt to report at least 75% improvement in concordant symptoms for improved activity participation. ON GOING      Plan   Plan to progress dynamic balance and habituation exercises as tolerated. Plan to decrease saccades since patient in (I) with this exercise on his HEP.     Outpatient physical therapy 2 times weekly during POC period from 01/08/2019 to 03/05/19. Physical Therapy treatment to include: Pt Education, HEP, therapeutic exercises, neuromuscular re-education,  therapeutic activities, manual therapy, joint mobilizations, aquatic therapy and modalities PRN to achieve established goals. Pt may be seen by PTA as part of the rehabilitation team.        Vickie Nunez, PT

## 2019-01-29 ENCOUNTER — CLINICAL SUPPORT (OUTPATIENT)
Dept: REHABILITATION | Facility: HOSPITAL | Age: 79
End: 2019-01-29
Attending: STUDENT IN AN ORGANIZED HEALTH CARE EDUCATION/TRAINING PROGRAM
Payer: MEDICARE

## 2019-01-29 DIAGNOSIS — Z74.09 IMPAIRED FUNCTIONAL MOBILITY, BALANCE, GAIT, AND ENDURANCE: ICD-10-CM

## 2019-01-29 DIAGNOSIS — H81.90 VESTIBULAR DIZZINESS: ICD-10-CM

## 2019-01-29 PROCEDURE — 97112 NEUROMUSCULAR REEDUCATION: CPT | Mod: PO

## 2019-01-29 NOTE — PROGRESS NOTES
"  Physical Therapy Daily Treatment Note     Name: Chano Guillen  Clinic Number: [id number]    Therapy Diagnosis:   Encounter Diagnoses   Name Primary?    Impaired functional mobility, balance, gait, and endurance     Vestibular dizziness      Physician: Karina Marks MD    Visit Date: 1/29/2019    Physician Orders: Eval and Treat  Medical Diagnosis: Vertigo and Imbalance  Evaluation Date: 01/08/2019  Authorization Period Expiration: 12/31/2019  Plan of Care Certification Period: 01/08/2019 to 03/05/19  Visit #/Visits authorized: 6 / 20 (5 total visits of 2019)    Time In: 13:45  Time Out: 14:30  Total Billable Time: 45 minutes    Precautions: Standard and Fall     G codes 6/10     On ST caseload?no         Subjective     Pt reports:  Pt reports no change in dizziness and that the pressure in his sinus area feels increased.  HEP compliance status: Pt reports compliance with HEP.   Response to previous treatment: Pt tolerated prior session well.   Functional change: None reported.    Pain: 0/10  Location: N/A   Objective     Chano participated in neuromuscular re-education activities to improve: Balance and oculomotor coordination, speed, and endurance for 45 minutes. The following activities were included:    Oculomotor exercises to improve oculomotor symmetry, speed, and endurance while seated.     VOR: Self-selected speed, mildly busy environment, VCs to maintain full ROM    3 x 30" horizontal VOR while reading black numbers on index card at arms length.     Trial 1: no increase in baseline dizziness . Mod slippage present.      Trial 2: No increase in dizziness. Mod slippage noted.      Trial 3: Mild increase in dizziness. Mod slippage noted. An increase in the feeling of pressure.   Smooth pursuit:  Self-selected speed, mildly busy environment, VCs to maintain static head    2 x 30", horizontal, reading black numbers on index card; no increase in dizziness, slight slippage noted    Parallel Bars:   Firm " "ground:    1 x 60" NBOS static stance with EC, SBA, no LOB   Foam pad: no UE support, NBOS    3 x 60" static stance with EC, SBA, occasional UE touchdown assist for minor LOBs, min-mod Sway. Caused headache and the feeling of increased pressure.    2 x 30" R<>L head turns, SBA, min-mod sway, no LOB    2 x 30" up <> down head turns, SBA,  Min-mod Sway, no LOB   4 point Foam Fitter: SBA    2 x 30" normal static stance, EC, normal sway    2 x 30" normal static stance, EC, with R and L weight shifts, mild dizziness. Moderate unsteadiness.    1 x 60" normal static stance, EC, with R and L weight shifts, mild increase in pressure. Moderate unsteadiness.    X 4 laps tandem ambulation in // bars, no UE support, SBA. Difficulty with tandem and tends to widen SYLVIA demonstrating more of a semi-tandem walk. BUE touchdown support for LOBs.      Habituation: SBA, pt demo'd wider SYLVIA through out.   2 x 100 ft, horizontal head turns (R<>L), in closed hallway. SBA, min-mod veering. Increased dizziness at the end of first lap and short standing rest break before continuing onto second lap.    2 x 100 ft, vertical head turns (up<>down), in a closed hallway. SBA, min-mod veering. 1 LOB requiring Feng from PT.  No increase in dizziness.    Home Exercises Provided and Patient Education Provided     Education provided: Seated horizontal saccades, and Seated horizontal VOR x 1.    Written Home Exercises Provided: yes.  Exercises were reviewed and Chano was able to demonstrate them prior to the end of the session.  Chano demonstrated good  understanding of the education provided.     See EMR under Media for exercises provided 1/15/2019.    Assessment   Pt tolerated therapy well this afternoon but continues to report an increase in pressure throughout the front of his face which he attributes to sinus pressure.  Pt continues to demonstrate increased slippage with VOR exercises when performing exercise in full ROM. Habituation exercise of " ambulating in hallway with horizontal head turns continues to be most provocative when patient completes full ROM L<>R. While dizziness and symptoms have not increased with vertical head turns, he did have increased veering and unsteadiness compared to horizontal head turns. During static stance patient continues to have difficulty with activities involving distorted proprioception and vision eliminated, but he is demonstrating improvements such as fewer LOB episodes and decreased UE support. Pt encouraged to follow up with MD team regarding sinus pressure. Cont. PT POC.     Chano is progressing well towards his goals.   Pt prognosis is Good.     Pt will continue to benefit from skilled outpatient physical therapy to address the deficits listed in the problem list box on initial evaluation, provide pt/family education and to maximize pt's level of independence in the home and community environment.     Pt's spiritual, cultural and educational needs considered and pt agreeable to plan of care and goals.     Anticipated barriers to physical therapy: Age and past medical history    Goals:   Short term goals: 4 weeks, pt agrees to goals set.  1. Pt will verbalize understanding and independence with HEP. MET 01/25/19  2. Pt will perform saccades at 80 bpm without elicitation of increased dizziness or oculomotor fatigue. MET 01/25/19  3. Pt will perform VOR exercises with less than 20% visual slippage without provocation of increased dizziness. ON GOING  4. Pt will score 25 secs on GST Condition 3 in order to improve functional mobility. ON GOING  5. Pt will score 5 secs on GST Condition 4 in order to improve functional mobility. ON GOING  6. Pt will score 18 secs on GST Condition 5 in order to improve functional mobility. ON GOING  7. Pt will score 4 secs on GST Condition 6 in order to improve functional mobility. ON GOING  8. Pt will score no more than 60 degrees of rotation either direction on GST Condition 7 in order to  demonstrate improved compensation of vestibular system. ON GOING  9. Pt will score a 19/30 on the FGA in order to demonstrate increased dynamic balance and functional mobility. ON GOING  10. Pt to report at least 50% improvement in concordant symptoms for improved activity participation. ON GOING     Long term goals: 8 weeks, pt agrees to goals set  11. Pt will perform saccades at 90 bpm  without elicitation of increased dizziness or oculomotor fatigue. ON GOING  12. Pt will perform VOR exercises with less than 10% visual slippage without provocation of increased dizziness. ON GOING  13. Pt will score 30 (P) secs on GST Condition 3 in order to improve functional mobility. ON GOING  14. Pt will score 8 secs on GST Condition 4 in order to improve functional mobility. ON GOING  15. Pt will score 23 secs on GST Condition 5 in order to improve functional mobility. ON GOING  16. Pt will score 7 secs on GST Condition 6 in order to improve functional mobility. ON GOING  17. Pt will score 45 degrees of rotation on the L on GST Condition 7 in order to demonstrate improved compensation of vestibular system. ON GOING  18. Pt will score a 23/30 on the FGA in order to demonstrate increased dynamic balance and functional mobility and decreased fall risk. ON GOING  19. Pt to report at least 75% improvement in concordant symptoms for improved activity participation. ON GOING      Plan   Plan to progress dynamic balance and habituation exercises as tolerated.    Outpatient physical therapy 2 times weekly during POC period from 01/08/2019 to 03/05/19. Physical Therapy treatment to include: Pt Education, HEP, therapeutic exercises, neuromuscular re-education, therapeutic activities, manual therapy, joint mobilizations, aquatic therapy and modalities PRN to achieve established goals. Pt may be seen by PTA as part of the rehabilitation team.     Diamante Christiansen, SPT    I certify that I was present in the room directing the student in  service delivery and guiding them using my skilled judgment. As the co-signing therapist I have reviewed the students documentation and am responsible for the treatment, assessment, and plan.     Vickie Nunez, PT

## 2019-01-31 ENCOUNTER — CLINICAL SUPPORT (OUTPATIENT)
Dept: REHABILITATION | Facility: HOSPITAL | Age: 79
End: 2019-01-31
Attending: STUDENT IN AN ORGANIZED HEALTH CARE EDUCATION/TRAINING PROGRAM
Payer: MEDICARE

## 2019-01-31 DIAGNOSIS — Z74.09 IMPAIRED FUNCTIONAL MOBILITY, BALANCE, GAIT, AND ENDURANCE: ICD-10-CM

## 2019-01-31 DIAGNOSIS — H81.90 VESTIBULAR DIZZINESS: ICD-10-CM

## 2019-01-31 PROCEDURE — 97112 NEUROMUSCULAR REEDUCATION: CPT | Mod: PO

## 2019-01-31 NOTE — PROGRESS NOTES
"  Physical Therapy Daily Treatment Note     Name: Chano Guillen  Clinic Number: [id number]    Therapy Diagnosis:   Encounter Diagnoses   Name Primary?    Impaired functional mobility, balance, gait, and endurance     Vestibular dizziness      Physician: Karina Marks MD    Visit Date: 1/31/2019    Physician Orders: Eval and Treat  Medical Diagnosis: Vertigo and Imbalance  Evaluation Date: 01/08/2019  Authorization Period Expiration: 12/31/2019  Plan of Care Certification Period: 01/08/2019 to 03/05/19  Visit #/Visits authorized: 7 / 20 (6 total visits of 2019)    Time In: 1430  Time Out: 1515  Total Billable Time: 45 minutes    Precautions: Standard and Fall     G codes 7/10     On ST caseload?no         Subjective     Pt reports:  His dizziness is constant and he rates at 8-9/10.  HEP compliance status: Pt reports compliance with HEP.   Response to previous treatment: Pt tolerated prior session well.   Functional change: None reported.    Pain: 0/10  Location: N/A   Objective     Chano participated in neuromuscular re-education activities to improve: Balance and oculomotor coordination, speed, and endurance for 45 minutes. The following activities were included:    Oculomotor exercises to improve oculomotor symmetry, speed, and endurance while seated.     VOR: Self-selected speed, mildly busy environment, VCs to maintain full ROM    3 x 30" horizontal VOR while reading black numbers on index card at arms length.     Trial 1: no increase in baseline dizziness . Moderate eye slippage noted      Trial 2: slight decrease in dizziness compared to 1st trial. Moderate eye slippage noted.      Trial 3: no change in dizziness. Minimal/ Moderate eye slippage noted.      Smooth pursuit:  Self-selected speed, mildly busy environment, VCs to maintain static head    3 x 30", horizontal, reading black numbers on index card; no increase in dizziness, slight slippage noted( PT moves card for pt due to pt unable to coordinate " "task)     Standing balance in Parallel Bars:    X 4 laps tandem ambulation in // bars, no UE support, CGA. Slight difficulty noted with true tandem foot placement and pt needs cues to correct. Pt may demonstrate occasional touching of bar with UEs.  X 4 laps forward marching with 3 " holds, CGA and no UE support, though pt may intermittently touch bar with UEs       On 4 point Foam Fitter:     1 x 30"  static standing, no UE support, EO, normal sway, SBA    1 x 30" static standing, no UE support, EC, SBA/CGA with occasional UE touchdown     1 x 30" standing with performance of vertical head movements, no UE support, EO, CGA/SBA    2 x 30 " standing with performance of horizontal head turns to R and L , no UE support, EO, SBA      On Bosu( soft side up):    2 x 30" static standing, no UE support, EO, min A    Outside parallel bars:   1 x 10 stepping onto 4 point foam fitter with alternate foot and performing reciprocal hip flex with other limb and placing this foot on floor across fitter~ CGA to occasional min A due to mild LOB      Habituation exercises:    1 lap x 100 ft, horizontal head turns (R<>L), partially in closed hallway with SBA, min-mod veering. Increased dizziness reported from baseline    1 lap x 100 ft, vertical head turns (up<>down), partially in closed hallway with SBA, min veering.   No reported increase in dizziness.    Pt retrieves and replaces 4 medium cones from 2nd step on 4 step stair case( ~12 " height) while in standing, then performs same task with cones placed on 1st step of staircase( ~ 6 " height).  PT provides supervision during performance and pt reports no increase in dizziness.    Pt performs 4 trials of figure 8 walking with 2 large cones set up about 5-6 feet apart; PT provides SBA during task and pt denies any increase in dizziness.     Home Exercises Provided and Patient Education Provided     Education provided: Seated horizontal saccades, and Seated horizontal VOR x " 1.    Written Home Exercises Provided: yes.  Exercises were reviewed and Chano was able to demonstrate them prior to the end of the session.  Chano demonstrated good  understanding of the education provided.     See EMR under Media for exercises provided 1/15/2019.    Assessment   Pt tolerated therapy well this afternoon but continues to report dizziness which is constant throughout the day.  Pt does demonstrate difficulty with performance of VOR x 1 due to min/mod eye slippage and mild slippage with performance of horizontal smooth pursuit. PT not always able to view eyes closely as pt has tendency to allow eyelids to close during performance( R eye > L). Pt also not able to coordinate movement of index card while keeping head still during smooth pursuit exercises. While pt does seem to experience slightly increased dizziness with performance of horizontal head turns during ambulation, pt's overall reports of changes in dizziness do not always seem reliable or consistent. From a balance standpoint, pt is improving but SLS, tandem stance and standing on unstable surfaces continue to provide challenges.  Pt also tends to overestimate his ability at times and demonstrates questionable safety awareness.  Pt remains appropriate for OPPT to address current deficits. Cont with current PT POC.     Chano is progressing well towards his goals.   Pt prognosis is Good.     Pt will continue to benefit from skilled outpatient physical therapy to address the deficits listed in the problem list box on initial evaluation, provide pt/family education and to maximize pt's level of independence in the home and community environment.     Pt's spiritual, cultural and educational needs considered and pt agreeable to plan of care and goals.     Anticipated barriers to physical therapy: Age and past medical history    Goals:   Short term goals: 4 weeks, pt agrees to goals set.  1. Pt will verbalize understanding and independence with HEP. MET  01/25/19  2. Pt will perform saccades at 80 bpm without elicitation of increased dizziness or oculomotor fatigue. MET 01/25/19  3. Pt will perform VOR exercises with less than 20% visual slippage without provocation of increased dizziness. ON GOING  4. Pt will score 25 secs on GST Condition 3 in order to improve functional mobility. ON GOING  5. Pt will score 5 secs on GST Condition 4 in order to improve functional mobility. ON GOING  6. Pt will score 18 secs on GST Condition 5 in order to improve functional mobility. ON GOING  7. Pt will score 4 secs on GST Condition 6 in order to improve functional mobility. ON GOING  8. Pt will score no more than 60 degrees of rotation either direction on GST Condition 7 in order to demonstrate improved compensation of vestibular system. ON GOING  9. Pt will score a 19/30 on the FGA in order to demonstrate increased dynamic balance and functional mobility. ON GOING  10. Pt to report at least 50% improvement in concordant symptoms for improved activity participation. ON GOING     Long term goals: 8 weeks, pt agrees to goals set  11. Pt will perform saccades at 90 bpm  without elicitation of increased dizziness or oculomotor fatigue. ON GOING  12. Pt will perform VOR exercises with less than 10% visual slippage without provocation of increased dizziness. ON GOING  13. Pt will score 30 (P) secs on GST Condition 3 in order to improve functional mobility. ON GOING  14. Pt will score 8 secs on GST Condition 4 in order to improve functional mobility. ON GOING  15. Pt will score 23 secs on GST Condition 5 in order to improve functional mobility. ON GOING  16. Pt will score 7 secs on GST Condition 6 in order to improve functional mobility. ON GOING  17. Pt will score 45 degrees of rotation on the L on GST Condition 7 in order to demonstrate improved compensation of vestibular system. ON GOING  18. Pt will score a 23/30 on the FGA in order to demonstrate increased dynamic balance and  functional mobility and decreased fall risk. ON GOING  19. Pt to report at least 75% improvement in concordant symptoms for improved activity participation. ON GOING      Plan   Plan to progress dynamic balance and habituation exercises as tolerated.    Outpatient physical therapy 2 times weekly during POC period from 01/08/2019 to 03/05/19. Physical Therapy treatment to include: Pt Education, HEP, therapeutic exercises, neuromuscular re-education, therapeutic activities, manual therapy, joint mobilizations, aquatic therapy and modalities PRN to achieve established goals. Pt may be seen by PTA as part of the rehabilitation team.       Arturo Slaughter, PT

## 2019-02-06 ENCOUNTER — DOCUMENTATION ONLY (OUTPATIENT)
Dept: REHABILITATION | Facility: HOSPITAL | Age: 79
End: 2019-02-06

## 2019-02-06 NOTE — PROGRESS NOTES
Face to face meeting completed with Vickie Nunez PT regarding current status and progress of   Chano Guillen .  Daniel Garcia, PTA

## 2019-02-07 ENCOUNTER — CLINICAL SUPPORT (OUTPATIENT)
Dept: REHABILITATION | Facility: HOSPITAL | Age: 79
End: 2019-02-07
Attending: STUDENT IN AN ORGANIZED HEALTH CARE EDUCATION/TRAINING PROGRAM
Payer: MEDICARE

## 2019-02-07 DIAGNOSIS — H81.90 VESTIBULAR DIZZINESS: ICD-10-CM

## 2019-02-07 DIAGNOSIS — Z74.09 IMPAIRED FUNCTIONAL MOBILITY, BALANCE, GAIT, AND ENDURANCE: ICD-10-CM

## 2019-02-07 PROCEDURE — G8978 MOBILITY CURRENT STATUS: HCPCS | Mod: CJ,PO

## 2019-02-07 PROCEDURE — G8979 MOBILITY GOAL STATUS: HCPCS | Mod: CI,PO

## 2019-02-07 PROCEDURE — 97112 NEUROMUSCULAR REEDUCATION: CPT | Mod: PO

## 2019-02-07 NOTE — PROGRESS NOTES
"  Physical Therapy Daily Treatment Note     Name: Chano Guillen  Clinic Number: [id number]    Therapy Diagnosis:   Encounter Diagnoses   Name Primary?    Impaired functional mobility, balance, gait, and endurance     Vestibular dizziness      Physician: Karina Marks MD    Visit Date: 2/7/2019    Physician Orders: Eval and Treat  Medical Diagnosis: Vertigo and Imbalance  Evaluation Date: 01/08/2019  Authorization Period Expiration: 12/31/2019  Plan of Care Certification Period: 01/08/2019 to 03/05/19  Visit #/Visits authorized: 8 / 20 (8 total visits of 2019)    Time In: 13:45  Time Out: 14:30  Total Billable Time: 45 minutes    Precautions: Standard and Fall     G codes 1/10     On ST caseload?no     Subjective     Pt reports:  His dizziness is constant and he rates at 6/10.  HEP compliance status: Pt reports compliance with HEP.   Response to previous treatment: He felt pretty good.  Functional change: None reported.    Pain: 0/10  Location: N/A   Objective     Chano participated in neuromuscular re-education activities to improve: Balance and oculomotor coordination, speed, and endurance for 45 minutes. The following activities were included:    Visual/Auditory:   Saccades: Impaired: pt lost the beat but was able to perform at both 80 and 100 bpm.  Acuity: Intact, but sometimes wear glasses  R/L discrimination: Intact  Visual field: Intact  Smooth Pursuit: WFL, jerky movements with horizontal and diagonal movements up tot he patients L. Eyes move symmetrically.  Convergence: WFL  VOR: Impaired: slippage noted in both R and L directions, but patient able to move head more smoothly and slippages was not as severe.   VCR: N/T    NICOL SENSORY TESTING:  (P= Pass, F= Fail; note any sway; hold each position for 30")  Condition 1: (firm surface/feet together/eyes open) P  Condition 2: (firm surface/feet together/eyes closed) P, Increased dizziness with EC  Condition 3: (firm surface/feet in tandem/eyes open) " P  Condition 4: (firm surface/feet in tandem/eyes closed) F, 6 sec   Condition 5: (soft surface/feet together/eyes open) P  Condition 6: (soft surface/feet together/eyes closed) F, 19 sec  Condition 7: (Fakuda step test), F, rotated approximately 66 degress to the L      Functional Gait Assessment:   1. Gait on level surface =  3  2. Change in Gait Speed = 2  3. Gait with horizontal head turns  = 2  4. Gait with vertical head turns = 2  5. Gait with pivot turns = 2  6. Step over obstacle = 3  7. Gait with Narrow SYLVIA = 2  8. Gait with eyes closed = 2  9. Ambulating Backwards = 2  10. Steps = 2     Score 22/30    Functional Limitations Reports - G Codes  Category:  Mobility  Tool:  FGA  Score: 22/30 (27% impaired)   Current: CJ at least 20% < 40% impaired, limited or restricted  Goal: CI at least 1% but less than 20% impaired, limited or restricted      Home Exercises Provided and Patient Education Provided     Education provided: Seated horizontal saccades, and Seated horizontal VOR x 1.    Written Home Exercises Provided: yes.  Exercises were reviewed and Chano was able to demonstrate them prior to the end of the session.  Chano demonstrated good  understanding of the education provided.     See EMR under Media for exercises provided 1/15/2019.    Assessment   Pt tolerated therapy well this afternoon but continues to report dizziness which is constant throughout the day and remains the same from initial evaluation. Pt is attempting to follow up with PCP or ENT regarding complaints of sinus pressure. Pt reassessed today to determine progress with therapy. Pt demo'd improved saccadic eye movements, but is inconsistent maintaining beat at 100 bpm. Mr. Chano knutson improved VOR1 but was still demonstrating visual slippage in both R and L directions. However, these slippages were milder than during previous sessions. Visual oculomotor deficits will continue to be addressed during PT sessions. Mr. Maciass static balance is  significantly improved since initial evaluation as evident by his performance on the GST, but he still demonstrates difficulty with tandem stance and vision eliminated on soft surface. His performance on the fakuda on the GST demonstrates an increase but incomplete compensation for his vestibular deficits based on the decreased bias to the left. Mr. Madden has significantly improved his dynamic balance as evident by his 6 point increase on the FGA. At this time, his FGA score still places him at an increased fall risk. Mr. Madden continues to have difficulty with ambualtion with head movements, both horizontal and vertical, as well as changes in direction, vision eliminated, and NBOS ambulation. PT to continue with current POC to continue addressing remaining deficits listed above and as well as in the problem list.    Chano is progressing well towards his goals.   Pt prognosis is Good.     Pt will continue to benefit from skilled outpatient physical therapy to address the deficits listed in the problem list box on initial evaluation, provide pt/family education and to maximize pt's level of independence in the home and community environment.     Pt's spiritual, cultural and educational needs considered and pt agreeable to plan of care and goals.     Anticipated barriers to physical therapy: Age and past medical history    Goals:   Short term goals: 4 weeks, pt agrees to goals set.  1. Pt will verbalize understanding and independence with HEP. MET 01/25/19  2. Pt will perform saccades at 80 bpm without elicitation of increased dizziness or oculomotor fatigue. MET 01/25/19  3. Pt will perform VOR exercises with less than 20% visual slippage without provocation of increased dizziness. Progressing  4. Pt will score 25 secs on GST Condition 3 in order to improve functional mobility. MET ON 2/7/2019  5. Pt will score 5 secs on GST Condition 4 in order to improve functional mobility. PROGRESSING  6. Pt will score 18 secs on GST  Condition 5 in order to improve functional mobility. MET ON 2/7/2019  7. Pt will score 4 secs on GST Condition 6 in order to improve functional mobility. MET ON 2/7/2019  8. Pt will score no more than 60 degrees of rotation either direction on GST Condition 7 in order to demonstrate improved compensation of vestibular system. PROGRESSING  9. Pt will score a 19/30 on the FGA in order to demonstrate increased dynamic balance and functional mobility. MET ON 2/7/2019  10. Pt to report at least 50% improvement in concordant symptoms for improved activity participation. ON GOING    Long term goals: 8 weeks, pt agrees to goals set  11. Pt will perform saccades at 90 bpm without elicitation of increased dizziness or oculomotor fatigue. PROGRESSING  12. Pt will perform VOR exercises with less than 10% visual slippage without provocation of increased dizziness. ON GOING  13. Pt will score 30 (P) secs on GST Condition 3 in order to improve functional mobility. MET ON 2/7/2019  14. Pt will score 8 secs on GST Condition 4 in order to improve functional mobility. PROGRESSING  15. Pt will score 23 secs on GST Condition 5 in order to improve functional mobility. MET ON 2/7/2019  16. Pt will score 7 secs on GST Condition 6 in order to improve functional mobility. MET ON 2/7/2019   Revised on 2/7/2019: Pt will score 30 secs on GST Condition 6 in order to improve functional mobility. PROGRESSING  17. Pt will score 45 degrees of rotation on the L on GST Condition 7 in order to demonstrate improved compensation of vestibular system. PROGRESSING  18. REVISED ON 2/7/2019: Pt will score a 25/30 on the FGA in order to demonstrate increased dynamic balance and functional mobility and decreased fall risk. PROGRESSING  19. Pt to report at least 75% improvement in concordant symptoms for improved activity participation. ON GOING      Plan   Plan to progress dynamic balance, oculomotor, and habituation exercises as tolerated.    Outpatient  physical therapy 2 times weekly during POC period from 01/08/2019 to 03/05/19. Physical Therapy treatment to include: Pt Education, HEP, therapeutic exercises, neuromuscular re-education, therapeutic activities, manual therapy, joint mobilizations, aquatic therapy and modalities PRN to achieve established goals. Pt may be seen by PTA as part of the rehabilitation team.     Diamante Christiansen, SPT    I certify that I was present in the room directing the student in service delivery and guiding them using my skilled judgment. As the co-signing therapist I have reviewed the students documentation and am responsible for the treatment, assessment, and plan.     HIMANSHU MejiaT

## 2019-02-11 ENCOUNTER — DOCUMENTATION ONLY (OUTPATIENT)
Dept: REHABILITATION | Facility: HOSPITAL | Age: 79
End: 2019-02-11

## 2019-02-11 NOTE — PROGRESS NOTES
PT/PTA met face to face to discuss pt's treatment plan and progress towards established goals. Continue with current PT POC. Pt will be seen by physical therapist at least every 6th treatment day or every 30 days, whichever occurs first.      Puneet Allison, PTA  02/11/2019

## 2019-02-12 ENCOUNTER — CLINICAL SUPPORT (OUTPATIENT)
Dept: REHABILITATION | Facility: HOSPITAL | Age: 79
End: 2019-02-12
Attending: STUDENT IN AN ORGANIZED HEALTH CARE EDUCATION/TRAINING PROGRAM
Payer: MEDICARE

## 2019-02-12 DIAGNOSIS — H81.90 VESTIBULAR DIZZINESS: ICD-10-CM

## 2019-02-12 DIAGNOSIS — Z74.09 IMPAIRED FUNCTIONAL MOBILITY, BALANCE, GAIT, AND ENDURANCE: ICD-10-CM

## 2019-02-12 PROCEDURE — 97112 NEUROMUSCULAR REEDUCATION: CPT | Mod: PO

## 2019-02-12 NOTE — PROGRESS NOTES
"  Physical Therapy Daily Treatment Note     Name: Chano Guillen  Clinic Number: [id number]    Therapy Diagnosis:   Encounter Diagnoses   Name Primary?    Impaired functional mobility, balance, gait, and endurance     Vestibular dizziness      Physician: Karina Marks MD    Visit Date: 2/12/2019    Physician Orders: Eval and Treat  Medical Diagnosis: Vertigo and Imbalance  Evaluation Date: 01/08/2019  Authorization Period Expiration: 12/31/2019  Plan of Care Certification Period: 01/08/2019 to 03/05/19  Visit #/Visits authorized: 9 / 20 (9 total visits of 2019)    Time In: 13:30  Time Out: 14:15  Total Billable Time: 15 minutes    Precautions: Standard and Fall     G codes 2/10     On ST caseload?no     Subjective     Pt reports:  His dizziness is constant and he rates at 8/10. Pt reports that the weather is making it worse.  HEP compliance status: Pt reports compliance with HEP.   Response to previous treatment: He felt pretty good.  Functional change: None reported.    Pain: 0/10  Location: N/A   Objective   Pt only to be charged for 1, 15 minute unit due to concurrent time with another patient.    Chano received therapeutic exercises to develop CV endurance and flexibility for 15 minutes including:     X 10 minutes of SciFit Stepper for CV endurance and muscle elasticity, L3  3 x30" BLE gastroc stretch on incline      Chano participated in neuromuscular re-education activities to improve: Balance and oculomotor coordination, speed, and endurance for 30 minutes. The following activities were included:    Oculomotor exercises to improve oculomotor symmetry, speed, and endurance while seated.      Smooth pursuit:  Self-selected speed, mildly busy environment, VCs to maintain static head                          2 x 30", horizontal, reading blackblack words on white background on index card; no increase in dizziness, no slippage noted                VOR: Self-selected speed, mildly busy environment, VCs to " "maintain full ROM                          3 x 30" horizontal VOR while reading black words on white background on index card at arms length.                                      Trial 1: no increase in baseline dizziness. mild slippage present.                                       Trial 2: No increase in dizziness. mild slippage present.                                       Trial 3: No increase in dizziness. mild slippage present.. A slight decrease in the feeling of pressure.                 Parallel Bars:              Foam pad: no UE support, NBOS                          3 x 60" static stance with EC, SBA, mild increase in dizziness with each trial, min-mod sway                          2 x 30" slow R<>L head turns, EC, SBA, min-mod sway, occasional 1 finger touchdown assist to regain equilibrium with minor LOBS                          2 x 30" slow up <> down head turns, SBA, min mod sway, mildly light headed at end of second trial, occasional 1 finger touchdown assist to regain equilibrium with minor LOBS   Ambulation:                   X 2 laps tandem ambulation in // bars, no UE support, SBA. Difficulty with tandem and tends to widen SYLVIA demonstrating more of a semi-tandem walk. BUE touchdown support for LOBs.   Firm Surface:     1 x 30" SLS on RLE with LLE on hard side of BOSU, SBA, min sway    1 x 30" SLS on LLE with RLE on hard side of BOSU, SBA, min sway                  Habituation: SBA, pt demo'd wider SYLVIA through out.              1 x 100 ft, horizontal head turns (R<>L), in closed hallway. SBA, min-mod veering. 1 LOB requiring Feng from PT. No increase in dizziness.              1 x 100 ft, vertical head turns (up<>down), in a closed hallway. SBA, min-mod veering. No increase in dizziness.    Home Exercises Provided and Patient Education Provided     Education provided: Seated horizontal saccades, and Seated horizontal VOR x 1.    Written Home Exercises Provided: yes.  Exercises were reviewed and " Chano was able to demonstrate them prior to the end of the session.  Chano demonstrated good  understanding of the education provided.     See EMR under Media for exercises provided 1/15/2019.    Assessment   Pt tolerated therapy well this afternoon but continues to report constant dizziness. Pt again advised to follow up with ENT or PCP for sinus pressure. Pt continues to progress with increasingly more difficult balance exercises, but still has difficulty with head turns with ambulating in the hallway. PT to continue with current POC to continue addressing remaining deficits listed above and as well as in the problem list.    Chano is progressing well towards his goals.   Pt prognosis is Good.     Pt will continue to benefit from skilled outpatient physical therapy to address the deficits listed in the problem list box on initial evaluation, provide pt/family education and to maximize pt's level of independence in the home and community environment.     Pt's spiritual, cultural and educational needs considered and pt agreeable to plan of care and goals.     Anticipated barriers to physical therapy: Age and past medical history    Goals:   Short term goals: 4 weeks, pt agrees to goals set.  1. Pt will verbalize understanding and independence with HEP. MET 01/25/19  2. Pt will perform saccades at 80 bpm without elicitation of increased dizziness or oculomotor fatigue. MET 01/25/19  3. Pt will perform VOR exercises with less than 20% visual slippage without provocation of increased dizziness. Progressing  4. Pt will score 25 secs on GST Condition 3 in order to improve functional mobility. MET ON 2/7/2019  5. Pt will score 5 secs on GST Condition 4 in order to improve functional mobility. PROGRESSING  6. Pt will score 18 secs on GST Condition 5 in order to improve functional mobility. MET ON 2/7/2019  7. Pt will score 4 secs on GST Condition 6 in order to improve functional mobility. MET ON 2/7/2019  8. Pt will score no  more than 60 degrees of rotation either direction on GST Condition 7 in order to demonstrate improved compensation of vestibular system. PROGRESSING  9. Pt will score a 19/30 on the FGA in order to demonstrate increased dynamic balance and functional mobility. MET ON 2/7/2019  10. Pt to report at least 50% improvement in concordant symptoms for improved activity participation. ON GOING    Long term goals: 8 weeks, pt agrees to goals set  11. Pt will perform saccades at 90 bpm without elicitation of increased dizziness or oculomotor fatigue. PROGRESSING  12. Pt will perform VOR exercises with less than 10% visual slippage without provocation of increased dizziness. ON GOING  13. Pt will score 30 (P) secs on GST Condition 3 in order to improve functional mobility. MET ON 2/7/2019  14. Pt will score 8 secs on GST Condition 4 in order to improve functional mobility. PROGRESSING  15. Pt will score 23 secs on GST Condition 5 in order to improve functional mobility. MET ON 2/7/2019  16. Pt will score 7 secs on GST Condition 6 in order to improve functional mobility. MET ON 2/7/2019   Revised on 2/7/2019: Pt will score 30 secs on GST Condition 6 in order to improve functional mobility. PROGRESSING  17. Pt will score 45 degrees of rotation on the L on GST Condition 7 in order to demonstrate improved compensation of vestibular system. PROGRESSING  18. REVISED ON 2/7/2019: Pt will score a 25/30 on the FGA in order to demonstrate increased dynamic balance and functional mobility and decreased fall risk. PROGRESSING  19. Pt to report at least 75% improvement in concordant symptoms for improved activity participation. ON GOING      Plan   Plan to progress dynamic balance, oculomotor, and habituation exercises as tolerated.    Outpatient physical therapy 2 times weekly during POC period from 01/08/2019 to 03/05/19. Physical Therapy treatment to include: Pt Education, HEP, therapeutic exercises, neuromuscular re-education,  therapeutic activities, manual therapy, joint mobilizations, aquatic therapy and modalities PRN to achieve established goals. Pt may be seen by PTA as part of the rehabilitation team.     Diamante Christiansen, SPT    I certify that I was present in the room directing the student in service delivery and guiding them using my skilled judgment. As the co-signing therapist I have reviewed the students documentation and am responsible for the treatment, assessment, and plan.     HIMANSHU MejiaT

## 2019-02-14 ENCOUNTER — CLINICAL SUPPORT (OUTPATIENT)
Dept: REHABILITATION | Facility: HOSPITAL | Age: 79
End: 2019-02-14
Attending: STUDENT IN AN ORGANIZED HEALTH CARE EDUCATION/TRAINING PROGRAM
Payer: MEDICARE

## 2019-02-14 DIAGNOSIS — Z74.09 IMPAIRED FUNCTIONAL MOBILITY, BALANCE, GAIT, AND ENDURANCE: ICD-10-CM

## 2019-02-14 DIAGNOSIS — H81.90 VESTIBULAR DIZZINESS: ICD-10-CM

## 2019-02-14 PROCEDURE — 97112 NEUROMUSCULAR REEDUCATION: CPT | Mod: PO | Performed by: PHYSICAL THERAPIST

## 2019-02-14 PROCEDURE — 97110 THERAPEUTIC EXERCISES: CPT | Mod: PO | Performed by: PHYSICAL THERAPIST

## 2019-02-14 NOTE — PROGRESS NOTES
"  Physical Therapy Daily Treatment Note     Name: Chano Guillen  Clinic Number: [id number]    Therapy Diagnosis:   Encounter Diagnoses   Name Primary?    Impaired functional mobility, balance, gait, and endurance     Vestibular dizziness      Physician: Karina Marks MD    Visit Date: 2/14/2019    Physician Orders: Eval and Treat  Medical Diagnosis: Vertigo and Imbalance  Evaluation Date: 01/08/2019  Authorization Period Expiration: 12/31/2019  Plan of Care Certification Period: 01/08/2019 to 03/05/19  Visit #/Visits authorized: 10 / 20 (10 total visits of 2019)    Time In: 13:50  Time Out: 14:35  Total Billable Time: 45 minutes    Precautions: Standard and Fall     G codes 3/10     On ST caseload?no     Subjective     Pt reports:  Pt reports his dizziness continues to remain at 8/10.  HEP compliance status: Pt reports compliance with HEP.   Response to previous treatment: Felt pretty good.  Functional change: None reported.    Pain: 0/10  Location: N/A   Objective     Chano received therapeutic exercises to develop CV endurance and flexibility for 15 minutes including:     X 10 minutes of SciFit Stepper for CV endurance and muscle elasticity, L2  3 x 30" BLE gastroc stretch on incline    Chano participated in neuromuscular re-education activities to improve: Balance and oculomotor coordination, speed, and endurance for 30 minutes. The following activities were included:    Oculomotor exercises to improve oculomotor symmetry, speed, and endurance while seated.      Smooth pursuit:  Self-selected speed, mildly busy environment with mirror in the back ground, VCs to maintain static head                          2 x 30", horizontal, reading blackblack words on white background on index card; no increase in dizziness, no slippage noted                VOR: Self-selected speed, mildly busy environment, VCs to maintain full ROM. SPT held card for patient to remain directly in front of patient. This seemed to help the " "patient perform the exercise smoothly.                          3 x 30" horizontal VOR while reading black words on white background on index card at arms length.                                      Trial 1: no increase in baseline dizziness. mild slippage present.                                       Trial 2: No increase in dizziness. mild slippage present.                                       Trial 3: No increase in dizziness. mild slippage present.. A slight increase in the feeling of pressure.                 Parallel Bars:              Foam pad: no UE support, NBOS      1 x 30" static stance with EC, SBA, min sway; occasional UE touchdown assist                          2 x 60" static stance with EC, SBA, mild increase in dizziness at the end of the last trial, min-mod sway; occasional UE touchdown assist                          1 x 45" slow R<>L head turns, EC, SBA, min-mod sway, occasional 1 finger touchdown assist to regain equilibrium with minor LOBS. Greater difficulty that vertical head turns                          1 x 45" slow up <> down head turns, SBA, min sway, no LOB, no UE support; complaints of increased pressure around the eyes, sinus, and sinus areas     Ambulation:                  X 4 laps tandem ambulation in // bars, no UE support, SBA. Difficulty with tandem and tends to widen SYLVIA demonstrating more of a semi-tandem walk. 1 UE touchdown support for LOBs.     Firm Surface:    2 x 30" Tandem stance with RLE in front; SBA, min sway, no LOB    2 x 30" Tandem stance with LLE in front; SBA, min sway, no LOB              1 x 30" RLE SLS, 1 finger RUE. Min sway, pt unwilling to attempt without UE support.    1 x 30" LLE SLS, 1 finger LUE. Min sway, pt unwilling to attempt without UE support.    Habituation: SBA, pt demo'd wider SYLVIA through out. Occasional VCs to maintain walking speed.               2 x 100 ft, horizontal head turns (R<>L), in closed hallway. SBA, min-mod veering. No increase " in dizziness.              2 x 100 ft, vertical head turns (up<>down), in a closed hallway. SBA, min veering. No increase in dizziness.    Home Exercises Provided and Patient Education Provided     Education provided: Seated horizontal saccades, and Seated horizontal VOR x 1.    Written Home Exercises Provided: yes.  Exercises were reviewed and Chano was able to demonstrate them prior to the end of the session.  Chano demonstrated good  understanding of the education provided.     See EMR under Media for exercises provided 1/15/2019.    Assessment   Pt tolerated therapy well this afternoon and continues to report his constant dizziness. Pt again advised to follow up with ENT or PCP for sinus pressure. Pt continues to progress with increasingly more difficult balance exercises, but still has difficulty with head turns while ambulating in the hallway. Pt's tandem stance  Has greatly improved and will be progressed at future sessions as patient tolerates. PT to continue with current POC to continue addressing remaining deficits listed above and as well as in the problem list.    Chano is progressing well towards his goals.   Pt prognosis is Good.     Pt will continue to benefit from skilled outpatient physical therapy to address the deficits listed in the problem list box on initial evaluation, provide pt/family education and to maximize pt's level of independence in the home and community environment.     Pt's spiritual, cultural and educational needs considered and pt agreeable to plan of care and goals.     Anticipated barriers to physical therapy: Age and past medical history    Goals:   Short term goals: 4 weeks, pt agrees to goals set.  1. Pt will verbalize understanding and independence with HEP. MET 01/25/19  2. Pt will perform saccades at 80 bpm without elicitation of increased dizziness or oculomotor fatigue. MET 01/25/19  3. Pt will perform VOR exercises with less than 20% visual slippage without provocation of  increased dizziness. Progressing  4. Pt will score 25 secs on GST Condition 3 in order to improve functional mobility. MET ON 2/7/2019  5. Pt will score 5 secs on GST Condition 4 in order to improve functional mobility. PROGRESSING  6. Pt will score 18 secs on GST Condition 5 in order to improve functional mobility. MET ON 2/7/2019  7. Pt will score 4 secs on GST Condition 6 in order to improve functional mobility. MET ON 2/7/2019  8. Pt will score no more than 60 degrees of rotation either direction on GST Condition 7 in order to demonstrate improved compensation of vestibular system. PROGRESSING  9. Pt will score a 19/30 on the FGA in order to demonstrate increased dynamic balance and functional mobility. MET ON 2/7/2019  10. Pt to report at least 50% improvement in concordant symptoms for improved activity participation. ON GOING    Long term goals: 8 weeks, pt agrees to goals set  11. Pt will perform saccades at 90 bpm without elicitation of increased dizziness or oculomotor fatigue. PROGRESSING  12. Pt will perform VOR exercises with less than 10% visual slippage without provocation of increased dizziness. ON GOING  13. Pt will score 30 (P) secs on GST Condition 3 in order to improve functional mobility. MET ON 2/7/2019  14. Pt will score 8 secs on GST Condition 4 in order to improve functional mobility. PROGRESSING  15. Pt will score 23 secs on GST Condition 5 in order to improve functional mobility. MET ON 2/7/2019  16. Pt will score 7 secs on GST Condition 6 in order to improve functional mobility. MET ON 2/7/2019   Revised on 2/7/2019: Pt will score 30 secs on GST Condition 6 in order to improve functional mobility. PROGRESSING  17. Pt will score 45 degrees of rotation on the L on GST Condition 7 in order to demonstrate improved compensation of vestibular system. PROGRESSING  18. REVISED ON 2/7/2019: Pt will score a 25/30 on the FGA in order to demonstrate increased dynamic balance and functional mobility  and decreased fall risk. PROGRESSING  19. Pt to report at least 75% improvement in concordant symptoms for improved activity participation. ON GOING      Plan   Cont with VOR- same; may progress tracking as tolerated. Pt to practice narrow base of support and balance on foam, decrease visual support during balance activities.     Outpatient physical therapy 2 times weekly during POC period from 01/08/2019 to 03/05/19. Physical Therapy treatment to include: Pt Education, HEP, therapeutic exercises, neuromuscular re-education, and modalities PRN to achieve established goals. Pt may be seen by PTA as part of the rehabilitation team.     Diamante Christiansen, SPT    I, Dayna Paniagua, DPT, certify that I was present in the room directing the student in service delivery and guiding them using my skilled judgment. As the co-signing therapist I have reviewed the students documentation and am responsible for the treatment, assessment, and plan.     HIMANSHU DanielleT  2/14/2019

## 2019-02-27 ENCOUNTER — OFFICE VISIT (OUTPATIENT)
Dept: NEUROLOGY | Facility: CLINIC | Age: 79
End: 2019-02-27
Payer: MEDICARE

## 2019-02-27 VITALS
WEIGHT: 184.5 LBS | BODY MASS INDEX: 27.96 KG/M2 | SYSTOLIC BLOOD PRESSURE: 145 MMHG | HEART RATE: 50 BPM | DIASTOLIC BLOOD PRESSURE: 76 MMHG | HEIGHT: 68 IN

## 2019-02-27 DIAGNOSIS — R27.0 ATAXIA: ICD-10-CM

## 2019-02-27 DIAGNOSIS — H81.90 VESTIBULAR DIZZINESS: Primary | ICD-10-CM

## 2019-02-27 DIAGNOSIS — R42 VERTIGO: ICD-10-CM

## 2019-02-27 DIAGNOSIS — J34.89 SINUS PRESSURE: ICD-10-CM

## 2019-02-27 PROCEDURE — 99215 OFFICE O/P EST HI 40 MIN: CPT | Mod: GC,S$GLB,, | Performed by: STUDENT IN AN ORGANIZED HEALTH CARE EDUCATION/TRAINING PROGRAM

## 2019-02-27 PROCEDURE — 1101F PT FALLS ASSESS-DOCD LE1/YR: CPT | Mod: CPTII,GC,S$GLB, | Performed by: STUDENT IN AN ORGANIZED HEALTH CARE EDUCATION/TRAINING PROGRAM

## 2019-02-27 PROCEDURE — 99999 PR PBB SHADOW E&M-EST. PATIENT-LVL IV: CPT | Mod: PBBFAC,GC,, | Performed by: STUDENT IN AN ORGANIZED HEALTH CARE EDUCATION/TRAINING PROGRAM

## 2019-02-27 PROCEDURE — 99999 PR PBB SHADOW E&M-EST. PATIENT-LVL IV: ICD-10-PCS | Mod: PBBFAC,GC,, | Performed by: STUDENT IN AN ORGANIZED HEALTH CARE EDUCATION/TRAINING PROGRAM

## 2019-02-27 PROCEDURE — 99215 PR OFFICE/OUTPT VISIT, EST, LEVL V, 40-54 MIN: ICD-10-PCS | Mod: GC,S$GLB,, | Performed by: STUDENT IN AN ORGANIZED HEALTH CARE EDUCATION/TRAINING PROGRAM

## 2019-02-27 PROCEDURE — 1101F PR PT FALLS ASSESS DOC 0-1 FALLS W/OUT INJ PAST YR: ICD-10-PCS | Mod: CPTII,GC,S$GLB, | Performed by: STUDENT IN AN ORGANIZED HEALTH CARE EDUCATION/TRAINING PROGRAM

## 2019-02-27 NOTE — PROGRESS NOTES
Neurology Clinic  Follow up Visit    Patient Name: Chano Guillen  MRN: [id number]    CC: dizziness and weakness    HPI: Chano Guillen is a 78 y.o. male with HTN, presenting for follow up on dizziness and lower extremity weakness.    Interval History (2/27/19):  Patient has undergone VNG/posturography after our last visit. Also completed a course of PT for vestibular therapy and gait training which has not been helpful per patient, however wife has noted some improvement in his gait. patient continues to have dizziness and sensation of pressure in the face specially when bending over that is followed by generalized weakness. Upon questioning he admits to have had recurrent sinusitis over the course of the years.    Interval History (1/2/19):  The patient is still complaining of generalized weakness and dizziness specially when bending forward. He also mentions retr-orbital pressure. symptoms are intermittent, and has been going on for 2 years. Prior labs and imaging obtained after the last visit all within normal limits.    Initial Encounter on 11/28/18:  Patient is complaining of generalized fatigue, lower extremity weakness and dizziness within the last 6-12 months. He states that dizziness happens specially when bending forward, but not necessarily when getting up from bed or chair, overall he feels unsteady when walking. Also endorses burning pain in bilateral lower extremities. He also has droopy eyelids which he and his wife state that has been always like this and is similar to family. He denies recent weight loss, difficulty swallowing, choking or difficulty breathing. He has a strange sensation behind his eyes, not necessarily pain but fullness (?), has PND and dry mouth. He has been taking meclizine since oct 2017 for dizziness w/o any response. He denies smoking or drinking alcohol      Review of Systems:  General: fevers -, chills -, fatigue +  Eyes: changes in vision -  ENT: sinus pressure +,  hearing loss -, difficulty swallowing -, change in voice -  Respiratory: SOB -, cough -,  choking -  CV: chest pain -, palpitations -  GI: nausea -, vomiting -, abdominal pain -  Urinary: dysuria -, hematuria -, frequency -  Skin: rash -, change of color -  Neurological: Headache -, dizziness +, weakness +, numbness -, seizure -, confusion -, gait problem +  Psychiatric: hallucinations -, dysphoric mood +, anxiety -      Past Medical History  Past Medical History:   Diagnosis Date    High cholesterol     Hypertension     Hypertrophy of prostate without urinary obstruction and other lower urinary tract symptoms (LUTS)     Impotence of organic origin     Nocturia     Urinary frequency        Medications    Current Outpatient Medications:     amlodipine (NORVASC) 5 MG tablet, Take 10 mg by mouth once daily. , Disp: , Rfl:     aspirin (ECOTRIN) 81 MG EC tablet, Take 81 mg by mouth once daily., Disp: , Rfl:     cyanocobalamin, vitamin B-12, 1,000 mcg TbSR, Take 1,000 mcg by mouth once daily., Disp: , Rfl: 0    finasteride (PROSCAR) 5 mg tablet, Take 5 mg by mouth once daily., Disp: , Rfl:     montelukast (SINGULAIR) 10 mg tablet, Take 10 mg by mouth every evening., Disp: , Rfl:     MULTIVIT-MINERALS/FERROUS FUM (MULTI VITAMIN ORAL), Take by mouth., Disp: , Rfl:     OMEGA-3/DHA/EPA/FISH OIL (OMEGA-3 ORAL), Take by mouth., Disp: , Rfl:     omeprazole (PRILOSEC) 20 MG capsule, Take 20 mg by mouth once daily., Disp: , Rfl:     oxybutynin (DITROPAN) 5 MG Tab, Take 5 mg by mouth 3 (three) times daily., Disp: , Rfl:     sod bicarb-sod chlor-neti pot (SINUS WASH NETIPOT) pkdv, 1 packet by Nasal route 2 (two) times daily as needed (nasal congestion)., Disp: , Rfl:     VITAMIN D2 50,000 unit capsule, 5,000 Units. , Disp: , Rfl: 0  Any other notable medications as documented in HPI    Allergies  Review of patient's allergies indicates:  No Known Allergies    Social History  Social History     Socioeconomic History  "   Marital status:      Spouse name: Not on file    Number of children: Not on file    Years of education: Not on file    Highest education level: Not on file   Social Needs    Financial resource strain: Not on file    Food insecurity - worry: Not on file    Food insecurity - inability: Not on file    Transportation needs - medical: Not on file    Transportation needs - non-medical: Not on file   Occupational History    Not on file   Tobacco Use    Smoking status: Never Smoker    Smokeless tobacco: Never Used   Substance and Sexual Activity    Alcohol use: No    Drug use: No    Sexual activity: Not on file   Other Topics Concern    Not on file   Social History Narrative    Not on file     Any other notable Social History as documented in HPI.    Family History  No family history on file.  Any other notable FMH as documented in HPI.    Physical Exam  BP (!) 145/76   Pulse (!) 50   Ht 5' 8" (1.727 m)   Wt 83.7 kg (184 lb 8.4 oz)   BMI 28.06 kg/m²     General: Well-developed, well-groomed. Dysphoric mood.  HENT: Normocephalic, atraumatic. No tenderness to palpation on sinuses.  Cardiovascular: Regular rate and rhythm with no murmurs, rubs or gallops.    Chest: Lungs clear to auscultation bilaterally.  No wheezes, stridor, ronchi appreciated.  Abdomen: Normoactive bowel sounds present.  Soft, nontender to palpation.  Musculoskeletal: No peripheral edema    Neurologic Exam:   Mental status and Cognition:  Awake, alert and oriented x3  Follows commands, answers questions appropriately  Short term memory intact  Praxis normal  Speech is nasal which is again at baseline per patient and wife.    Cranial nerves:   PERRLA. EOM intact. No Nystagmus. No ophthalmoplegia.  Continues to have bilateral droopy eyelids (baseline per patient and wife)  Visual fields are full to confrontation.    Facial sensation is normal to light touch.   Facial expression is full and symmetric, except for bilateral " ptosis.  Hearing is intact bilaterally.   Palate elevates symmetrically.   SCM and Trapezius full strength bilaterally.   Tongue is midline.     Motor examination   normal bulk and tone in all four limbs. There are no atrophy or fasciculations.   Strength is 4+/5 in lower extremities and 5/5 on upper extremities bilaterally.    Sensory examination  Sensation to light touch: intact in BUE and BLE  Sensation to temperature: intact in BUE and BLE  Sensation to vibration: decreased up to mid keenan  Romberg has moderately improved.    Deep tendon reflexes   1+ and symmetric in the upper and lower extremities bilaterally.    No clonus. Babinski normal bilaterally.    Gait and Coordination:  Normal gait.  Tandem walking has moderately improved.  Finger to nose is normal bilaterally.      Lab and Test Results    WBC   Date Value Ref Range Status   11/28/2018 4.72 3.90 - 12.70 K/uL Final   10/19/2017 3.83 (L) 3.90 - 12.70 K/uL Final   03/19/2015 4.39 3.90 - 12.70 K/uL Final     Hemoglobin   Date Value Ref Range Status   11/28/2018 13.8 (L) 14.0 - 18.0 g/dL Final   10/19/2017 13.8 (L) 14.0 - 18.0 g/dL Final   03/19/2015 12.6 (L) 14.0 - 18.0 g/dL Final     Hematocrit   Date Value Ref Range Status   11/28/2018 41.6 40.0 - 54.0 % Final   10/19/2017 40.1 40.0 - 54.0 % Final   03/19/2015 36.9 (L) 40.0 - 54.0 % Final     Platelets   Date Value Ref Range Status   11/28/2018 166 150 - 350 K/uL Final   10/19/2017 167 150 - 350 K/uL Final   03/19/2015 176 150 - 350 K/uL Final     Glucose   Date Value Ref Range Status   10/19/2017 105 70 - 110 mg/dL Final   03/19/2015 97 70 - 110 mg/dL Final   06/29/2011 115 (H) 70 - 110 mg/dl Final     Sodium   Date Value Ref Range Status   10/19/2017 142 136 - 145 mmol/L Final   03/19/2015 144 136 - 145 mmol/L Final   06/29/2011 143 136 - 145 mmol/L Final     Potassium   Date Value Ref Range Status   10/19/2017 3.9 3.5 - 5.1 mmol/L Final   03/19/2015 3.8 3.5 - 5.1 mmol/L Final   06/29/2011 3.1 (L) 3.5  - 5.1 mmol/L Final     Chloride   Date Value Ref Range Status   10/19/2017 109 95 - 110 mmol/L Final   03/19/2015 109 95 - 110 mmol/L Final   06/29/2011 112 (H) 95 - 110 mmol/L Final     CO2   Date Value Ref Range Status   10/19/2017 26 23 - 29 mmol/L Final   03/19/2015 26 23 - 29 mmol/L Final   06/29/2011 21 (L) 23.0 - 29.0 mmol/L Final     BUN, Bld   Date Value Ref Range Status   10/19/2017 14 8 - 23 mg/dL Final   03/19/2015 17 8 - 23 mg/dL Final   06/29/2011 25 (H) 8 - 23 mg/dl Final     Creatinine   Date Value Ref Range Status   11/28/2018 1.0 0.5 - 1.4 mg/dL Final   10/19/2017 1.2 0.5 - 1.4 mg/dL Final   03/19/2015 1.3 0.5 - 1.4 mg/dL Final     Calcium   Date Value Ref Range Status   10/19/2017 9.5 8.7 - 10.5 mg/dL Final   03/19/2015 9.6 8.7 - 10.5 mg/dL Final   06/29/2011 10.0 8.7 - 10.5 mg/dl Final     Alkaline Phosphatase   Date Value Ref Range Status   10/19/2017 72 55 - 135 U/L Final   03/19/2015 46 (L) 55 - 135 U/L Final   06/29/2011 67 55 - 135 U/L Final     ALT   Date Value Ref Range Status   10/19/2017 20 10 - 44 U/L Final   03/19/2015 15 10 - 44 U/L Final   06/29/2011 29 10 - 44 U/L Final     AST   Date Value Ref Range Status   10/19/2017 24 10 - 40 U/L Final   03/19/2015 20 10 - 40 U/L Final   06/29/2011 28 10 - 40 U/L Final     Images:  MRI Brain W/WO (11/28/18):  1. No evidence of acute intracranial pathology.  2. Modest generalized cerebral volume loss and chronic microvascular ischemic changes, not unusually advanced for patient's age.    Procedures:  VNG + Posturography (1/9/2019):  Findings suggest a bilateral vestibular abnormality that is incompletely compensated. The significance of borderline abnormal saccadic velocities in isolation is unknown. Posturography findings suggest poor use of vestibular cues in maintaining balance.     Assessment and Plan    Problem List Items Addressed This Visit        Neuro    Ataxia    Current Assessment & Plan     Multifactorial.  In addition to vestibular  pathology patient seems to have sensory ataxia as evident by decreased sensation of vibration that could be related to aging as well as low levels of B12    Advised patient to obtain vitamin B12 supplements (cyanocobalamin 1000 mcg daily)   Will have him follow up in our movement disorder clinic (considering masked facies and bradykinesia)              ENT    Vertigo    Relevant Orders    Ambulatory Referral to ENT    Vestibular dizziness - Primary    Current Assessment & Plan     Based on audiology, VNG and posturography findings patient has evidence of uncompensated bilateral vestibular abnormality that has not responded to exercises provided by Audiologist. Based on what spouse has noted and physical examination, patient's gait and balance has slightly improved with PT. However symptoms are still quite restricting for the patient.     Will refer to ENT for addressing vestibular pathology         Relevant Orders    Ambulatory Referral to ENT    Sinus pressure    Current Assessment & Plan     Hx of recurrent sinusitis, sinus pressure, nasal voice and dizziness when bending over may be consistent with chronic sinusitis that is not responding to OTC medications and requires more advanced and specialized management.    Will refer to ENT         Relevant Orders    Ambulatory Referral to ENT              Karina Marks MD  PGY-II, Neurology Resident  [facility]  [address] [facility] [place] [address]

## 2019-02-27 NOTE — PATIENT INSTRUCTIONS
please obtain vitamin B12 supplements (cyanocobalamin 1000 mcg) that is available over the counter. Please start taking one tablet daily.

## 2019-03-01 NOTE — ASSESSMENT & PLAN NOTE
Based on audiology, VNG and posturography findings patient has evidence of uncompensated bilateral vestibular abnormality that has not responded to exercises provided by Audiologist. Based on what spouse has noted and physical examination, patient's gait and balance has slightly improved with PT. However symptoms are still quite restricting for the patient.     Will refer to ENT for addressing vestibular pathology

## 2019-03-01 NOTE — ASSESSMENT & PLAN NOTE
Multifactorial.  In addition to vestibular pathology patient seems to have sensory ataxia as evident by decreased sensation of vibration that could be related to aging as well as low levels of B12    Advised patient to obtain vitamin B12 supplements (cyanocobalamin 1000 mcg daily)   Will have him follow up in our movement disorder clinic (considering masked facies and bradykinesia)

## 2019-03-01 NOTE — ASSESSMENT & PLAN NOTE
Hx of recurrent sinusitis, sinus pressure, nasal voice and dizziness when bending over may be consistent with chronic sinusitis that is not responding to OTC medications and requires more advanced and specialized management.    Will refer to ENT

## 2019-03-06 ENCOUNTER — DOCUMENTATION ONLY (OUTPATIENT)
Dept: REHABILITATION | Facility: HOSPITAL | Age: 79
End: 2019-03-06

## 2019-03-06 DIAGNOSIS — H81.90 VESTIBULAR DIZZINESS: ICD-10-CM

## 2019-03-06 DIAGNOSIS — Z74.09 IMPAIRED FUNCTIONAL MOBILITY, BALANCE, GAIT, AND ENDURANCE: ICD-10-CM

## 2019-03-06 NOTE — PROGRESS NOTES
OUTPATIENT PHYSICAL THERAPY DISCHARGE SUMMARY     Name: Chano Guillen  Clinic Number: [id number]    Diagnosis:   Encounter Diagnoses   Name Primary?    Impaired functional mobility, balance, gait, and endurance     Vestibular dizziness      Physician: Karina Marks MD  Treatment Orders: PT Eval and Treat  Past Medical History:   Diagnosis Date    High cholesterol     Hypertension     Hypertrophy of prostate without urinary obstruction and other lower urinary tract symptoms (LUTS)     Impotence of organic origin     Nocturia     Urinary frequency        Initial visit: 01/09/19  Date of Last visit: 02/14/19  Date of Discharge Note:  03/06/19  Total Visits Received: 10  Missed Visits: 1 missed appointment  ASSESSMENT   Status Towards Goals Met:   Please see below for goal achievement. Also, please refer to last note on 02/14/19 for most updated functional status.     Goals:   Short term goals: 4 weeks, pt agrees to goals set.  1. Pt will verbalize understanding and independence with HEP. MET 01/25/19  2. Pt will perform saccades at 80 bpm without elicitation of increased dizziness or oculomotor fatigue. MET 01/25/19  3. Pt will perform VOR exercises with less than 20% visual slippage without provocation of increased dizziness. Progressing  4. Pt will score 25 secs on GST Condition 3 in order to improve functional mobility. MET ON 2/7/2019  5. Pt will score 5 secs on GST Condition 4 in order to improve functional mobility. PROGRESSING  6. Pt will score 18 secs on GST Condition 5 in order to improve functional mobility. MET ON 2/7/2019  7. Pt will score 4 secs on GST Condition 6 in order to improve functional mobility. MET ON 2/7/2019  8. Pt will score no more than 60 degrees of rotation either direction on GST Condition 7 in order to demonstrate improved compensation of vestibular system. PROGRESSING  9. Pt will score a 19/30 on the FGA in order to demonstrate increased dynamic balance and functional  mobility. MET ON 2/7/2019  10. Pt to report at least 50% improvement in concordant symptoms for improved activity participation. ON GOING     Long term goals: 8 weeks, pt agrees to goals set  11. Pt will perform saccades at 90 bpm without elicitation of increased dizziness or oculomotor fatigue. PROGRESSING  12. Pt will perform VOR exercises with less than 10% visual slippage without provocation of increased dizziness. ON GOING  13. Pt will score 30 (P) secs on GST Condition 3 in order to improve functional mobility. MET ON 2/7/2019  14. Pt will score 8 secs on GST Condition 4 in order to improve functional mobility. PROGRESSING  15. Pt will score 23 secs on GST Condition 5 in order to improve functional mobility. MET ON 2/7/2019  16. Pt will score 7 secs on GST Condition 6 in order to improve functional mobility. MET ON 2/7/2019        Revised on 2/7/2019: Pt will score 30 secs on GST Condition 6 in order to improve functional mobility. PROGRESSING  17. Pt will score 45 degrees of rotation on the L on GST Condition 7 in order to demonstrate improved compensation of vestibular system. PROGRESSING  18. REVISED ON 2/7/2019: Pt will score a 25/30 on the FGA in order to demonstrate increased dynamic balance and functional mobility and decreased fall risk. PROGRESSING  19. Pt to report at least 75% improvement in concordant symptoms for improved activity participation. ON GOING        Goals Not achieved and why: Pt has not attended therapy since 02/14/19. He has not scheduled any further follow up appointments. Per MD note on 02/27/19, pt continues to c/o significant dizziness. MD to refer patient to ENT for further management at this time.       Discharge reason : Pt has not re-scheduled further follow-up sessions    PLAN   This patient is discharged from Outpatient Physical Therapy Services.     Vickie Nunez, PT  03/06/2019

## 2019-03-22 ENCOUNTER — TELEPHONE (OUTPATIENT)
Dept: NEUROLOGY | Facility: CLINIC | Age: 79
End: 2019-03-22

## 2019-03-22 NOTE — TELEPHONE ENCOUNTER
----- Message from Lashell Carrillo sent at 3/22/2019  9:45 AM CDT -----  Contact: Tamika (wife) @ [phone]  Calling to speak with someone regarding the appt that was scheduled for the patient on 3/27, but patient received a call rescheduling the appt to another doctor and service. Please call.

## 2019-04-10 ENCOUNTER — OFFICE VISIT (OUTPATIENT)
Dept: OTOLARYNGOLOGY | Facility: CLINIC | Age: 79
End: 2019-04-10
Payer: MEDICARE

## 2019-04-10 ENCOUNTER — CLINICAL SUPPORT (OUTPATIENT)
Dept: AUDIOLOGY | Facility: CLINIC | Age: 79
End: 2019-04-10
Payer: MEDICARE

## 2019-04-10 VITALS
DIASTOLIC BLOOD PRESSURE: 80 MMHG | BODY MASS INDEX: 27.83 KG/M2 | WEIGHT: 183 LBS | SYSTOLIC BLOOD PRESSURE: 144 MMHG | TEMPERATURE: 98 F | HEART RATE: 56 BPM

## 2019-04-10 DIAGNOSIS — R51.9 PRESSURE IN HEAD: ICD-10-CM

## 2019-04-10 DIAGNOSIS — H83.2X1: ICD-10-CM

## 2019-04-10 DIAGNOSIS — H90.3 HEARING LOSS, SENSORINEURAL, HIGH FREQUENCY, BILATERAL: ICD-10-CM

## 2019-04-10 DIAGNOSIS — J34.1 MAXILLARY SINUS CYST: Primary | ICD-10-CM

## 2019-04-10 DIAGNOSIS — R27.0 ATAXIA: ICD-10-CM

## 2019-04-10 DIAGNOSIS — H90.3 SENSORINEURAL HEARING LOSS, BILATERAL: Primary | ICD-10-CM

## 2019-04-10 DIAGNOSIS — R42 DIZZINESS: ICD-10-CM

## 2019-04-10 DIAGNOSIS — H83.2X2: ICD-10-CM

## 2019-04-10 DIAGNOSIS — R94.121 VESTIBULAR FUNCTION STUDY ABNORMALITY: ICD-10-CM

## 2019-04-10 DIAGNOSIS — Z87.898 HISTORY OF FACIAL PAIN: ICD-10-CM

## 2019-04-10 PROCEDURE — 99204 PR OFFICE/OUTPT VISIT, NEW, LEVL IV, 45-59 MIN: ICD-10-PCS | Mod: S$GLB,,, | Performed by: OTOLARYNGOLOGY

## 2019-04-10 PROCEDURE — 99999 PR PBB SHADOW E&M-EST. PATIENT-LVL IV: ICD-10-PCS | Mod: PBBFAC,,, | Performed by: OTOLARYNGOLOGY

## 2019-04-10 PROCEDURE — 92567 PR TYMPA2METRY: ICD-10-PCS | Mod: S$GLB,,, | Performed by: AUDIOLOGIST

## 2019-04-10 PROCEDURE — 92567 TYMPANOMETRY: CPT | Mod: S$GLB,,, | Performed by: AUDIOLOGIST

## 2019-04-10 PROCEDURE — 99999 PR PBB SHADOW E&M-EST. PATIENT-LVL IV: CPT | Mod: PBBFAC,,, | Performed by: OTOLARYNGOLOGY

## 2019-04-10 PROCEDURE — 1101F PR PT FALLS ASSESS DOC 0-1 FALLS W/OUT INJ PAST YR: ICD-10-PCS | Mod: CPTII,S$GLB,, | Performed by: OTOLARYNGOLOGY

## 2019-04-10 PROCEDURE — 99204 OFFICE O/P NEW MOD 45 MIN: CPT | Mod: S$GLB,,, | Performed by: OTOLARYNGOLOGY

## 2019-04-10 PROCEDURE — 1101F PT FALLS ASSESS-DOCD LE1/YR: CPT | Mod: CPTII,S$GLB,, | Performed by: OTOLARYNGOLOGY

## 2019-04-10 PROCEDURE — 92557 COMPREHENSIVE HEARING TEST: CPT | Mod: S$GLB,,, | Performed by: AUDIOLOGIST

## 2019-04-10 PROCEDURE — 92557 PR COMPREHENSIVE HEARING TEST: ICD-10-PCS | Mod: S$GLB,,, | Performed by: AUDIOLOGIST

## 2019-04-10 NOTE — LETTER
April 11, 2019      Karina Marks MD  [address]  [place] [address]           [facility]  [address]  [place] [address]  Phone: [phone]  Fax: [phone]          Patient: Chano Guillen   MR Number: [id number]   YOB: 1940   Date of Visit: 4/10/2019       Dear Dr. Karina Marks:    Thank you for referring Chano Guillen to me for evaluation. Attached you will find relevant portions of my assessment and plan of care.    If you have questions, please do not hesitate to call me. I look forward to following Chano Guillen along with you.    Sincerely,    Miguel Gong III, MD    Enclosure  CC:  No Recipients    If you would like to receive this communication electronically, please contact [email or web address] or [phone] to request more information on [employer] access.    For providers and/or their staff who would like to refer a patient to [facility], please contact us through our one-stop-shop provider referral line, [facility], at [phone].    If you feel you have received this communication in error or would no longer like to receive these types of communications, please e-mail [email or web address]

## 2019-04-10 NOTE — PROGRESS NOTES
Subjective:       Patient ID: Chano Guillen is a 78 y.o. male.    Chief Complaint: Dizziness    HPI: Mr. Guillen is an 78-year-old  male who works out several times a week at the gym including use of the treadmill.  He indicates his significant symptoms of dizziness 12 days a week . His dizziness never goes away.  Mr. Guillen is an active gentleman despite his dizziness symptoms.  He has never actually fallen according to his wife's testimony.  He used to have headaches frequently but they have calmed down in the recent past.  He indicates anterior facial discomfort and pressure symptoms however.  He is concerned about a possible chronic sinus condition.  He was examined in the neurology clinic 02/27/2019 for dizziness and lower extremity weakness.  He was diagnosed with ataxia, multifactorial, vertigo and vestibular dizziness and sinus pressure as well as a  history of recurrent sinusitis possibly chronic in nature.  He was referred to the ENT service for further evaluation of vestibular dizziness and sinus symptoms.    EMR notes indicates the patient's history of sinus pressure, nasal voicing and dizziness when bending over.    He had recently completed VNG testing including posturography testing 01/09/2019.    Posturography testing indicated normal motor control.  The sensory for cessation portion of the test indicated poor use of vestibular cues in maintaining balance.  VNG testing indicated abnormal oculomotor function i.e. borderline slow saccadic velocities.  There was no evidence of gaze nor spontaneous nystagmus.  Both of the head hanging Hallpike maneuvers revealed clinically insignificant non fatiguing nystagmus in the supine positions.  Static positional testing indicated nystagmus in all head positions.  Caloric testing indicated depressed responses for all stimulations for both ears.  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    Interestingly, the  patient completed this same battery of tests in April 2015 during his evaluation by me which indicated very similar findings.    Mr. white has completed many weeks of balance physical therapy ordered for him recently in the wake of his VNG/posturography test this year with some improvement with gait imbalance noted by the spouse over time.   More therapy may be indicated however.    He completed an MRI scan of the brain without contrast in December 2018 which indicated no evidence of acute intracranial pathology.  There was modest generalized cerebral volume loss chronic microvascular ischemic changes not unusually advanced for the patient's age.      PMH:  High blood pressure, vertigo, sinus congestion, occasionally severe headaches  Past Surgical History:   Procedure Laterality Date    CATARACT EXTRACTION      cysto/turp      CYSTOSCOPY      Family history:  Allergies:  None    Review of Systems   Ears: Positive for hearing loss, ear pressure and dizziness.    Nose:  Positive for loss of smell, postnasal drip and snoring.    Mouth/Throat: Positive for impaired swallowing, hoarse voice and oral ulcers.   Cardiovascular:  Positive for chest pain and history of high blood pressure.   Respiratory:  Positive for recent cough.    Gastrointestinal:  Positive for acid reflux.   Other:  Positive for kidney problem, bladder problem, prostate disease, weakness, depression and anxiety. Negative for rash.         The patient completed an audiometric study performed by the [facility] audiology service  The study is duplicated below and the results are reviewed with him in detail  Objective:         Blood pressure 144/80 pulse 56 temperature 97.8° height 5 ft 8 in weight 183 lb  General:  Alert and oriented gentleman in no acute distress; right eye >  left ptosis   Physical Exam   Constitutional: He is oriented to person, place, and time. He appears well-developed and well-nourished.   HENT:   Head: Normocephalic.   Right Ear:  Hearing, tympanic membrane and ear canal normal. No drainage. No foreign bodies. No mastoid tenderness. Tympanic membrane is not perforated. No decreased hearing is noted.   Left Ear: Hearing, tympanic membrane and ear canal normal. No drainage. No foreign bodies. No mastoid tenderness. Tympanic membrane is not perforated. No decreased hearing is noted.   Ears:    Nose: No nose lacerations, nasal deformity, septal deviation or nasal septal hematoma. No epistaxis. Right sinus exhibits no maxillary sinus tenderness and no frontal sinus tenderness. Left sinus exhibits no maxillary sinus tenderness and no frontal sinus tenderness.   Mouth/Throat: Uvula is midline, oropharynx is clear and moist and mucous membranes are normal. He does not have dentures. No oral lesions. No trismus in the jaw. No uvula swelling or dental caries. No oropharyngeal exudate or tonsillar abscesses.   Neck: No thyromegaly present.   Pulmonary/Chest: Effort normal. No stridor.   Lymphadenopathy:     He has no cervical adenopathy.   Neurological: He is alert and oriented to person, place, and time. He displays weakness.   Skin: No rash noted.   Psychiatric: His behavior is normal.       Assessment:       1. Maxillary sinus cyst    2. History of facial pain    3. Pressure in head    4. Ataxia    5. Dizziness    6. Vestibular function study abnormality    7. Labyrinthine weakness of left ear    8. Labyrinthine weakness of right ear    9. Hearing loss, sensorineural, high frequency, bilateral        Plan:     Sinus CT ordered; call for results  VNG results reviewed: bilateral labyrinthine weakness  Pt.may consider consultation with Dr. ANNITA Castro re: Rx options; appt. to be scheduled  [facility]

## 2019-04-10 NOTE — PATIENT INSTRUCTIONS
Sinus CT ordered; call for results  VNG results reviewed: bilateral labyrinthine weakness  Pt.may consider consultation with Dr. ANNITA Castro re: Rx options; appt. to be scheduled  [facility]

## 2019-04-10 NOTE — PROGRESS NOTES
Mr. Guillen was seen today for a hearing evaluation.     Pure tone audiometry revealed a mild-moderate SNHL, AD and a Mild SNHL, AS  SRT and PTA are in good agreement bilaterally.    SRT was obtained at 15dB for the right ear with 100% speech discrimination and 15dB for the left ear with 100% speech discrimination.   Tympanometry revealed Type A, AU.   Acoustic Reflexes were present ipsilaterally at 1000 and 2000 Hz., bilaterally    Recommendations:  1. Otologic Evaluation  2. Annual Audiogram  3. Hearing Protection  4. Hearing Aid Consult

## 2019-05-01 ENCOUNTER — HOSPITAL ENCOUNTER (OUTPATIENT)
Dept: RADIOLOGY | Facility: HOSPITAL | Age: 79
Discharge: HOME | End: 2019-05-01
Attending: OTOLARYNGOLOGY
Payer: MEDICARE

## 2019-05-01 DIAGNOSIS — J34.1 MAXILLARY SINUS CYST: ICD-10-CM

## 2019-05-01 DIAGNOSIS — Z87.898 HISTORY OF FACIAL PAIN: ICD-10-CM

## 2019-05-01 DIAGNOSIS — R51.9 PRESSURE IN HEAD: ICD-10-CM

## 2019-05-01 PROCEDURE — 70486 CT MAXILLOFACIAL W/O DYE: CPT | Mod: 26,,, | Performed by: RADIOLOGY

## 2019-05-01 PROCEDURE — 70486 CT MAXILLOFACIAL W/O DYE: CPT | Mod: TC

## 2019-05-01 PROCEDURE — 70486 CT MEDTRONIC SINUSES WITHOUT: ICD-10-PCS | Mod: 26,,, | Performed by: RADIOLOGY

## 2019-06-04 ENCOUNTER — OFFICE VISIT (OUTPATIENT)
Dept: OTOLARYNGOLOGY | Facility: CLINIC | Age: 79
End: 2019-06-04
Payer: MEDICARE

## 2019-06-04 VITALS — BODY MASS INDEX: 27.74 KG/M2 | WEIGHT: 183 LBS | HEIGHT: 68 IN

## 2019-06-04 DIAGNOSIS — H81.93 BALANCE PROBLEM DUE TO VESTIBULAR DYSFUNCTION, BILATERAL: ICD-10-CM

## 2019-06-04 DIAGNOSIS — R42 DIZZINESS: Primary | ICD-10-CM

## 2019-06-04 PROCEDURE — 99999 PR PBB SHADOW E&M-EST. PATIENT-LVL II: ICD-10-PCS | Mod: PBBFAC,,, | Performed by: OTOLARYNGOLOGY

## 2019-06-04 PROCEDURE — 99999 PR PBB SHADOW E&M-EST. PATIENT-LVL II: CPT | Mod: PBBFAC,,, | Performed by: OTOLARYNGOLOGY

## 2019-06-04 PROCEDURE — 1101F PT FALLS ASSESS-DOCD LE1/YR: CPT | Mod: CPTII,S$GLB,, | Performed by: OTOLARYNGOLOGY

## 2019-06-04 PROCEDURE — 99214 PR OFFICE/OUTPT VISIT, EST, LEVL IV, 30-39 MIN: ICD-10-PCS | Mod: S$GLB,,, | Performed by: OTOLARYNGOLOGY

## 2019-06-04 PROCEDURE — 1101F PR PT FALLS ASSESS DOC 0-1 FALLS W/OUT INJ PAST YR: ICD-10-PCS | Mod: CPTII,S$GLB,, | Performed by: OTOLARYNGOLOGY

## 2019-06-04 PROCEDURE — 99214 OFFICE O/P EST MOD 30 MIN: CPT | Mod: S$GLB,,, | Performed by: OTOLARYNGOLOGY

## 2019-06-04 RX ORDER — AMLODIPINE BESYLATE 10 MG/1
TABLET ORAL
Refills: 5 | Status: ON HOLD | COMMUNITY
Start: 2019-05-14 | End: 2021-09-20 | Stop reason: HOSPADM

## 2019-06-04 NOTE — LETTER
June 4, 2019      Miguel Gong III, MD  [address]  [place] [address]           [facility]  [address]  [place] [address]  Phone: [phone]  Fax: [phone]          Patient: Chano Guillen   MR Number: [id number]   YOB: 1940   Date of Visit: 6/4/2019       Dear Dr. Miguel Gong III:    Thank you for referring Chano Guillen to me for evaluation. Attached you will find relevant portions of my assessment and plan of care.    If you have questions, please do not hesitate to call me. I look forward to following Chano Guillen along with you.    Sincerely,    George Castro MD    Enclosure  CC:  No Recipients    If you would like to receive this communication electronically, please contact [email or web address] or [phone] to request more information on [employer] Link access.    For providers and/or their staff who would like to refer a patient to [facility], please contact us through our one-stop-shop provider referral line, [facility], at [phone].    If you feel you have received this communication in error or would no longer like to receive these types of communications, please e-mail [email or web address]

## 2019-06-04 NOTE — PROGRESS NOTES
Subjective:       Patient ID: Chano Guillen is a 78 y.o. male.    Chief Complaint: Dizziness    Dizziness:   Chronicity:  Chronic  Onset:  More than 1 year ago  Progression since onset:  Unchanged  Severity:  Moderate  Dizziness characteristics:  Walking on uneven surface and off-balanceno hearing loss, no fever, no tinnitus and no chest pain.  Aggravated by:  Bending and getting up   PMH includes: MRI head (negative).    Review of Systems   Constitutional: Negative for chills and fever.   HENT: Negative for hearing loss, sore throat, tinnitus and trouble swallowing.    Respiratory: Negative for apnea and chest tightness.    Cardiovascular: Negative for chest pain.   Neurological: Positive for dizziness.       Objective:      Physical Exam   Constitutional: He appears well-developed and well-nourished.   HENT:   Head: Normocephalic and atraumatic.   Right Ear: Tympanic membrane, external ear and ear canal normal.   Left Ear: Tympanic membrane, external ear and ear canal normal.   Nose: Nose normal.   Mouth/Throat: Uvula is midline, oropharynx is clear and moist and mucous membranes are normal.   Neck: Normal range of motion. No thyroid mass present.   Nursing note and vitals reviewed.      Data:  VNG consistent with bilateral vestibular hypofunction.    Audio:      Audiogram tracings independently reviewed and discussed with patient.  There is a high frequency SNHL with overall normal pure tone average, and speech discrimination is 100%.  Tympanometry is type A in both ears.     MRI brain images  independently reviewed by me.  No evidence of IAC or inner ear anomaly, no evidence of acoustic neuroma.        Assessment:       1. Dizziness    2. Balance problem due to vestibular dysfunction, bilateral        Plan:        patient with bilateral vestibular weakness. Etiology unknown likely multifactorial related to presbystasis and other co-morbidities.      Recommend continue vestibular exercises, no other  interventions recommended at this time

## 2019-10-29 ENCOUNTER — HOSPITAL ENCOUNTER (EMERGENCY)
Facility: HOSPITAL | Age: 79
Discharge: HOME | End: 2019-10-29
Attending: EMERGENCY MEDICINE
Payer: MEDICARE

## 2019-10-29 VITALS
HEART RATE: 67 BPM | TEMPERATURE: 99 F | WEIGHT: 179 LBS | SYSTOLIC BLOOD PRESSURE: 159 MMHG | HEIGHT: 68 IN | OXYGEN SATURATION: 97 % | DIASTOLIC BLOOD PRESSURE: 73 MMHG | RESPIRATION RATE: 25 BRPM | BODY MASS INDEX: 27.13 KG/M2

## 2019-10-29 DIAGNOSIS — E86.0 DEHYDRATION: Primary | ICD-10-CM

## 2019-10-29 DIAGNOSIS — I95.1 ORTHOSTASIS: ICD-10-CM

## 2019-10-29 DIAGNOSIS — R42 LIGHTHEADEDNESS: ICD-10-CM

## 2019-10-29 LAB
ALBUMIN SERPL BCP-MCNC: 4.2 G/DL (ref 3.5–5.2)
ALP SERPL-CCNC: 48 U/L (ref 55–135)
ALT SERPL W/O P-5'-P-CCNC: 22 U/L (ref 10–44)
ANION GAP SERPL CALC-SCNC: 6 MMOL/L (ref 8–16)
AST SERPL-CCNC: 26 U/L (ref 10–40)
BASOPHILS # BLD AUTO: 0.03 K/UL (ref 0–0.2)
BASOPHILS NFR BLD: 0.7 % (ref 0–1.9)
BILIRUB SERPL-MCNC: 0.6 MG/DL (ref 0.1–1)
BILIRUB UR QL STRIP: NEGATIVE
BUN SERPL-MCNC: 12 MG/DL (ref 8–23)
CALCIUM SERPL-MCNC: 9.5 MG/DL (ref 8.7–10.5)
CHLORIDE SERPL-SCNC: 108 MMOL/L (ref 95–110)
CLARITY UR: CLEAR
CO2 SERPL-SCNC: 27 MMOL/L (ref 23–29)
COLOR UR: NORMAL
CREAT SERPL-MCNC: 1.1 MG/DL (ref 0.5–1.4)
DIFFERENTIAL METHOD: ABNORMAL
EOSINOPHIL # BLD AUTO: 0.1 K/UL (ref 0–0.5)
EOSINOPHIL NFR BLD: 2.7 % (ref 0–8)
ERYTHROCYTE [DISTWIDTH] IN BLOOD BY AUTOMATED COUNT: 13.5 % (ref 11.5–14.5)
EST. GFR  (AFRICAN AMERICAN): >60 ML/MIN/1.73 M^2
EST. GFR  (NON AFRICAN AMERICAN): >60 ML/MIN/1.73 M^2
GLUCOSE SERPL-MCNC: 119 MG/DL (ref 70–110)
GLUCOSE UR QL STRIP: NEGATIVE
HCT VFR BLD AUTO: 38.8 % (ref 40–54)
HGB BLD-MCNC: 12.8 G/DL (ref 14–18)
HGB UR QL STRIP: NEGATIVE
IMM GRANULOCYTES # BLD AUTO: 0.01 K/UL (ref 0–0.04)
IMM GRANULOCYTES NFR BLD AUTO: 0.2 % (ref 0–0.5)
KETONES UR QL STRIP: NEGATIVE
LEUKOCYTE ESTERASE UR QL STRIP: NEGATIVE
LYMPHOCYTES # BLD AUTO: 1.3 K/UL (ref 1–4.8)
LYMPHOCYTES NFR BLD: 31.2 % (ref 18–48)
MCH RBC QN AUTO: 29.6 PG (ref 27–31)
MCHC RBC AUTO-ENTMCNC: 33 G/DL (ref 32–36)
MCV RBC AUTO: 90 FL (ref 82–98)
MONOCYTES # BLD AUTO: 0.4 K/UL (ref 0.3–1)
MONOCYTES NFR BLD: 9.1 % (ref 4–15)
NEUTROPHILS # BLD AUTO: 2.3 K/UL (ref 1.8–7.7)
NEUTROPHILS NFR BLD: 56.1 % (ref 38–73)
NITRITE UR QL STRIP: NEGATIVE
NRBC BLD-RTO: 0 /100 WBC
PH UR STRIP: 8 [PH] (ref 5–8)
PLATELET # BLD AUTO: 161 K/UL (ref 150–350)
PMV BLD AUTO: 10.1 FL (ref 9.2–12.9)
POTASSIUM SERPL-SCNC: 3.7 MMOL/L (ref 3.5–5.1)
PROT SERPL-MCNC: 7.2 G/DL (ref 6–8.4)
PROT UR QL STRIP: NEGATIVE
RBC # BLD AUTO: 4.33 M/UL (ref 4.6–6.2)
SODIUM SERPL-SCNC: 141 MMOL/L (ref 136–145)
SP GR UR STRIP: 1 (ref 1–1.03)
T4 FREE SERPL-MCNC: 0.77 NG/DL (ref 0.71–1.51)
TROPONIN I SERPL DL<=0.01 NG/ML-MCNC: 0.01 NG/ML (ref 0–0.03)
TSH SERPL DL<=0.005 MIU/L-ACNC: 0.12 UIU/ML (ref 0.4–4)
URN SPEC COLLECT METH UR: NORMAL
UROBILINOGEN UR STRIP-ACNC: NEGATIVE EU/DL
WBC # BLD AUTO: 4.07 K/UL (ref 3.9–12.7)

## 2019-10-29 PROCEDURE — 99284 EMERGENCY DEPT VISIT MOD MDM: CPT | Mod: 25

## 2019-10-29 PROCEDURE — 84439 ASSAY OF FREE THYROXINE: CPT

## 2019-10-29 PROCEDURE — 84443 ASSAY THYROID STIM HORMONE: CPT

## 2019-10-29 PROCEDURE — 63600175 PHARM REV CODE 636 W HCPCS: Performed by: EMERGENCY MEDICINE

## 2019-10-29 PROCEDURE — 84484 ASSAY OF TROPONIN QUANT: CPT

## 2019-10-29 PROCEDURE — 85025 COMPLETE CBC W/AUTO DIFF WBC: CPT

## 2019-10-29 PROCEDURE — 81003 URINALYSIS AUTO W/O SCOPE: CPT

## 2019-10-29 PROCEDURE — 80053 COMPREHEN METABOLIC PANEL: CPT

## 2019-10-29 RX ORDER — LISINOPRIL 40 MG/1
40 TABLET ORAL DAILY
COMMUNITY

## 2019-10-29 RX ADMIN — SODIUM CHLORIDE 1000 ML: 0.9 INJECTION, SOLUTION INTRAVENOUS at 03:10

## 2019-10-29 NOTE — ED PROVIDER NOTES
Encounter Date: 10/29/2019  SORT:   80 y/o male with history of HTN, HLD, vestibular dizziness, vertigo presenting for evaluation of intermittent dizziness for months, worsening within past 2 days. Lightheadedness, fatigue. He states Antivert does not relieve his symptoms. Review of chart shows pt has seen E[facility], [facility] this year. SHERRY Snyder   SCRIBE #1 NOTE: I, Miranda Martini, am scribing for, and in the presence of,  Jyoti Paredes MD. I have scribed the following portions of the note - Other sections scribed: HPI, ROS.       History     Chief Complaint   Patient presents with    Dizziness     Reports hx of vertigo and has been experiencing intermittent dizziness for a couple of months with dizziness worsening on today. c/o weakness.     CC: Dizziness    HPI: This 79 y.o male, with a medical history of high cholesterol, hypertension, hypertrophy of prostate without urinary obstruction and other lower urinary tract symptoms (LUTS), nocturia, and urinary frequency, presents to the ED accompanied by a relative c/o dizziness. Pt reports that he has been experiencing the symptoms intermittently for the last 3-4x months. He states that he experiences the episodes when attempting to stand from a seated position and when ambulating, noting that the symptoms are exacerbated when moving the head. Relative reports that pt continues to be active and exercise daily, but she notes that he has been moving slower than normal due to the symptoms.    Pt also reports experiencing generalized weakness and a tingling sensation to the bilateral legs. He adds that the bilateral legs have been feeling hot to the touch intermittently for the last few weeks. Relative reports that pt was taken off of amlodipine 2x weeks ago and subsequently had the dosage of his lisinopril increased. Additionally, she notes that pt has been urinating frequently. She states that he has a history of an enlarged prostate for which he  takes finasteride and oxybutynin. Pt denies fever, chills, abdominal pain, diarrhea, emesis, chest pain, SOB, palpitations, or episodes of diaphoresis. No other associated symptoms. No alleviating factors.    The history is provided by the patient. No  was used.     Review of patient's allergies indicates:  No Known Allergies  Past Medical History:   Diagnosis Date    High cholesterol     Hypertension     Hypertrophy of prostate without urinary obstruction and other lower urinary tract symptoms (LUTS)     Impotence of organic origin     Nocturia     Urinary frequency      Past Surgical History:   Procedure Laterality Date    CATARACT EXTRACTION      cysto/turp      CYSTOSCOPY       No family history on file.  Social History     Tobacco Use    Smoking status: Never Smoker    Smokeless tobacco: Never Used   Substance Use Topics    Alcohol use: No    Drug use: No     Review of Systems   Constitutional: Negative for chills and fever.   HENT: Negative for sore throat.    Eyes: Negative for visual disturbance.   Respiratory: Negative for shortness of breath.    Cardiovascular: Negative for chest pain.   Gastrointestinal: Negative for abdominal pain, diarrhea, nausea and vomiting.   Genitourinary: Positive for frequency. Negative for dysuria.   Musculoskeletal: Negative for back pain.   Skin: Negative for rash.        (+) bilateral legs feel hot to the touch   Neurological: Positive for dizziness, weakness (generalized) and numbness (tingling sensation to the bilateral legs).       Physical Exam     Initial Vitals [10/29/19 1312]   BP Pulse Resp Temp SpO2   (!) 183/84 (!) 56 20 98.7 °F (37.1 °C) 97 %      MAP       --         Physical Exam    Constitutional: He appears well-nourished. No distress.   HENT:   Head: Normocephalic.   Eyes: Conjunctivae and EOM are normal. Pupils are equal, round, and reactive to light.   Cardiovascular: Normal rate, regular rhythm, normal heart sounds and  intact distal pulses. Exam reveals no gallop and no friction rub.    No murmur heard.  Pulmonary/Chest: Breath sounds normal. No respiratory distress. He has no wheezes. He has no rhonchi. He has no rales. He exhibits no tenderness.   Abdominal: Soft. He exhibits no distension. There is no tenderness.   Musculoskeletal: Normal range of motion. He exhibits no edema or tenderness.   Neurological: He is alert and oriented to person, place, and time.   Symmetric stregnth bilateral upper and lower extremities   Normal EOM  No sensory deficits  No aphasia or dysarthria  No cognitive deficits noted, following all instructions without difficulty     Skin: Skin is warm. Capillary refill takes less than 2 seconds. No rash noted.   Psychiatric: His behavior is normal. Thought content normal.         ED Course   Procedures  Labs Reviewed   CBC W/ AUTO DIFFERENTIAL - Abnormal; Notable for the following components:       Result Value    RBC 4.33 (*)     Hemoglobin 12.8 (*)     Hematocrit 38.8 (*)     All other components within normal limits   COMPREHENSIVE METABOLIC PANEL - Abnormal; Notable for the following components:    Glucose 119 (*)     Alkaline Phosphatase 48 (*)     Anion Gap 6 (*)     All other components within normal limits   TSH - Abnormal; Notable for the following components:    TSH 0.122 (*)     All other components within normal limits   TROPONIN I   URINALYSIS, REFLEX TO URINE CULTURE    Narrative:     Preferred Collection Type->Urine, Clean Catch   T4, FREE          Imaging Results          CT Head Without Contrast (Final result)  Result time 10/29/19 15:47:14    Final result by Nilson Alaniz MD (10/29/19 15:47:14)                 Impression:      1. No acute intracranial process.  2. Mild generalized cerebral atrophy.      Electronically signed by: Nilson Alaniz MD  Date:    10/29/2019  Time:    15:47             Narrative:    EXAMINATION:  CT HEAD WITHOUT CONTRAST    CLINICAL  HISTORY:  Dizziness;    TECHNIQUE:  Low dose axial images were obtained through the head.  Coronal and sagittal reformations were also performed. Contrast was not administered.    COMPARISON:  MRI of the brain 12/07/2018, CT scan of the head 10/19/2017    FINDINGS:  Prominence of the lateral ventricles and cerebral sulci representing mild generalized cerebral atrophy.  There is no hydrocephalus or abnormal extra-axial fluid collections or acute intracranial hemorrhage.  Faint bilateral globus pallidus calcifications, felt to be benign calcifications.  The gray/white matter delineation is preserved.  No signs for acute major vessel territory infarctions or neoplasms or hemorrhage or midline shift or herniations.  Basal ganglia otherwise are unremarkable.    In the posterior fossa 4th ventricle is in the midline.  No cerebellar masses or hemorrhage.  There are no parasellar or pineal region masses.    Normal cranial vault.  The visualized paranasal air sinuses are clear.  There is normal pneumatization of the mastoids.                                79 year old M with multiple medical problems here with persistent episodes of fatigue/dizziness when standing, or doing increased activity. He has been evaluated by multiple physicians per pt and family but they cannot explain why. My differential includes normal aging for him, cardiac etiology, neulogic etiology. Have low suspition for MI,ACS or stroke given the time line and description of symptoms. Seems more like orthostatic intolerance likely due to his age. May be partly volume status since he urinated frequently. Wife in room sate that she has to remind him to drink water frequently. He continues to exercise despite symptoms. Fluids were administered and he reports feeling better after. Labs overall reassuring and CT with no evidence of old or new stroke. Pt was discharged to follow up with PCP and return precautions given                            Clinical  Impression:       ICD-10-CM ICD-9-CM   1. Dehydration E86.0 276.51   2. Lightheadedness R42 780.4   3. Orthostasis I95.1 458.0            Scribe attestation: I, Jyoti Paredes, personally performed the services described in this documentation. All medical record entries made by the scribe were at my direction and in my presence.  I have reviewed the chart and agree that the record reflects my personal performance and is accurate and complete.                      Jyoti Paredes MD  10/31/19 1026

## 2020-02-26 ENCOUNTER — TELEPHONE (OUTPATIENT)
Dept: PODIATRY | Facility: CLINIC | Age: 80
End: 2020-02-26

## 2020-02-26 NOTE — TELEPHONE ENCOUNTER
----- Message from Suzy Scales sent at 2/26/2020 11:06 AM CST -----  Contact: Wife   Pt is having some issues with the pinky toe on left foot, problems with walking as well as pain. Pt was offered an appt 03/12, but pt declined due to the fact his wife states he may not can wait another 10 days. Please reach out to the pt regarding this information.      Contact Info

## 2020-03-03 ENCOUNTER — OFFICE VISIT (OUTPATIENT)
Dept: PODIATRY | Facility: CLINIC | Age: 80
End: 2020-03-03
Payer: MEDICARE

## 2020-03-03 VITALS
HEART RATE: 53 BPM | BODY MASS INDEX: 27.17 KG/M2 | HEIGHT: 68 IN | SYSTOLIC BLOOD PRESSURE: 158 MMHG | WEIGHT: 179.25 LBS | DIASTOLIC BLOOD PRESSURE: 84 MMHG

## 2020-03-03 DIAGNOSIS — L84 HELOMA MOLLE: ICD-10-CM

## 2020-03-03 DIAGNOSIS — M79.675 TOE PAIN, LEFT: Primary | ICD-10-CM

## 2020-03-03 PROCEDURE — 99203 OFFICE O/P NEW LOW 30 MIN: CPT | Mod: S$GLB,,, | Performed by: PODIATRIST

## 2020-03-03 PROCEDURE — 1159F PR MEDICATION LIST DOCUMENTED IN MEDICAL RECORD: ICD-10-PCS | Mod: S$GLB,,, | Performed by: PODIATRIST

## 2020-03-03 PROCEDURE — 1125F AMNT PAIN NOTED PAIN PRSNT: CPT | Mod: S$GLB,,, | Performed by: PODIATRIST

## 2020-03-03 PROCEDURE — 1125F PR PAIN SEVERITY QUANTIFIED, PAIN PRESENT: ICD-10-PCS | Mod: S$GLB,,, | Performed by: PODIATRIST

## 2020-03-03 PROCEDURE — 99999 PR PBB SHADOW E&M-EST. PATIENT-LVL III: CPT | Mod: PBBFAC,,, | Performed by: PODIATRIST

## 2020-03-03 PROCEDURE — 99203 PR OFFICE/OUTPT VISIT, NEW, LEVL III, 30-44 MIN: ICD-10-PCS | Mod: S$GLB,,, | Performed by: PODIATRIST

## 2020-03-03 PROCEDURE — 99999 PR PBB SHADOW E&M-EST. PATIENT-LVL III: ICD-10-PCS | Mod: PBBFAC,,, | Performed by: PODIATRIST

## 2020-03-03 PROCEDURE — 1159F MED LIST DOCD IN RCRD: CPT | Mod: S$GLB,,, | Performed by: PODIATRIST

## 2020-03-03 PROCEDURE — 1101F PR PT FALLS ASSESS DOC 0-1 FALLS W/OUT INJ PAST YR: ICD-10-PCS | Mod: CPTII,S$GLB,, | Performed by: PODIATRIST

## 2020-03-03 PROCEDURE — 1101F PT FALLS ASSESS-DOCD LE1/YR: CPT | Mod: CPTII,S$GLB,, | Performed by: PODIATRIST

## 2020-03-03 NOTE — PATIENT INSTRUCTIONS
Recommend lotions: eucerin, eucerin for diabetics, aquaphor, A&D ointment, gold bond for diabetics, sween, Forsyth's Bees all purpose baby ointment,  urea 40 with aloe (found on amazon.com)    Shoe recommendations: (try 6pm.com, zappos.com , nordstromrack.Social Intelligence, or shoes.[employer] Intelligence for discounted prices) you can visit [employer] in [place]  or [employer] [facility] in the [facility] (there are also several shoe brand outlets in the [facility])    Asics (GT 2000 or gel foundations), new balance stability type shoes (such as the 940 series), saucony (stabil c3),  Christian (GTS or Beast or transcend), propet (tennis shoe)    Sofft Brand (women) Kinjal&Ulysses (men), clarks, crocs, aerosoles, naturalizers, SAS, ecco, born, gilma vega, rockports (dress shoes)    Vionic, burkenstocks, fitflops, propet (sandals)  Nike comfort thong sandals, crocs, propet (house shoes)    Nail Home remedy:  Vicks Vapor rub to nails for easier manageability    Occasional soaks for 15-20 mins in luke warm water with 1 cup of listerine and 1 cup of apple cider vinegar are ok You may add several drops of oil of oregano or tea tree oil as well      Wearing Proper Shoes                    You walk on your feet every day, forcing them to support the weight of your body. Repeated stress on your feet can cause damage over time. The right shoes can help protect your feet. The wrong shoes can cause more foot problems. Read the information below to help you find a shoe that fits your foot needs.     A good shoe fit will cover your foot outline.       A shoe that doesnt cover the outline is a bad fit.      Whats your foot shape?  To get a good fit, you need to know the shape of your foot. Do this simple test: While standing, place your foot on a piece of paper and trace around it. Is your foot straight or curved? Do you have a foot problem, such as a bunion, that causes your foot outline to show a bulge on the side of your big toe?  Finding your fit  Bring your foot outline  to the shoe store to help you find the right shoe. Place a shoe you like on top of the outline to see if it matches the shape. The shoe should cover the outline. (If you have a bunion, the shoe may not cover the bulge on the outline. Look for soft leather shoes to stretch over the bunion.) Once youve found a pair of proper shoes, put them on. Walk around. Be sure the shoes dont rub or pinch. If the shoes feel good, youve found your fit!  The right shoe for you  A good shoe has features that provide comfort and support. It must also be the right size and shape for your feet. Look for a shoe made of breathable fabric and lining, such as leather or canvas. Be sure that shoes have enough tread to prevent slipping. Go to a good shoe store for help finding the right shoe.  Good shoe features  An ideal shoe has the following:  · Laces for support. If tying laces is a problem for you, try shoes with Velcro fasteners or ramesh.  · A front of the shoe (toe box) with ½ inch space in front of your longest toes.  · An arch shape that supports your foot.  · No more than 1½ inches of heel.  · A stiff, snug back of the shoe to keep your foot from sliding around.  · A smooth lining with no rough seams.  Shoe shopping tips  Below are some dos and donts for when you go to the shoe store.  Do:  · Select the shoes that feel right. Wear them around the house. Then bring them to your foot healthcare provider to check for fit. If they dont fit well, return them.  · Shop late in the day, when your feet will be slightly bigger.  · Each time you buy shoes, have both your feet measured while you are standing. Foot size changes with time.  · Pick shoes to suit their purpose. High heels are OK for an occasional night on the town. But for everyday wear, choose a more sensible shoe.  · Try on shoes while wearing any inserts specially made for your feet (orthoses).  · Try on both the right and left shoes. If your feet are different sizes,  pick a pair that fits the larger foot.  Dont:  · Dont buy shoes based on shoe size alone. Always try on shoes, as sizes differ from brand to brand and within brands.  · Dont expect shoes to break in. If they dont fit at the store, dont buy them.  · Dont buy a shoe that doesnt match your foot shape.  What about socks?  Always wear socks with shoes. Socks help absorb sweat and reduce friction and blistering. When shopping for shoes, choose soft, padded socks with seams that dont irritate your feet.  If you have foot problems  Some foot problems cause deformities. This can make it hard to find a good fit. Look for shoes made of soft leather to stretch over the deformity. If you have bunions, buy shoes with a wider toe box. To fit hammertoes, look for shoes with a tall toe box. If you have arch problems, you may need inserts. In some cases, youll need to have custom footwear or orthoses made for your feet.  Suggested footwear  Ask your healthcare provider what kind of footwear you need. He or she may recommend a certain brand or shoe store.  Date Last Reviewed: 8/1/2016  © 3102-8649 [employer]. [address] [place] [address], [place] [address]. All rights reserved. This information is not intended as a substitute for professional medical care. Always follow your healthcare professional's instructions.        What Are Corns and Calluses?    Corns and calluses are your bodys response to friction or pressure against the skin. If your foot rubs the inside of your shoe, the affected area of skin thickens. Or, if a bone is not in the normal position, skin caught between bone and shoe or bone and ground builds up. In either case, the outer layer of skin thickens to protect the foot from unusual pressure. In many cases, corns and calluses look bad but are not harmful. However, more severe corns and calluses may become infected, destroy healthy tissue, or affect foot movement.    Corns  Corns usually grow on  top of the foot, often at the toe joint. Corns can range from a slight thickening of skin to a painful soft or hard bump. They often form on top of buckled toe joints (hammer toes). If your toes curl under, corns may grow on the tips of the toes. You may also get a corn on the end of a toe if it rubs against your shoe. Corns can also grow between toes, often between the first and second toes.    Calluses  Calluses grow on the bottom of the foot or on the outer edge of a toe or heel. A callus may spread across the ball of your foot. This type of callus is usually due to a problem with a metatarsal (the long bone at the base of a toe, near the ball of the foot). A pinch callus may grow along the outer edge of the heel or the big toe. Some calluses press up into the foot instead of spreading on the outside. A callus may form a central core or plug of tissue where pressure is greatest.  Date Last Reviewed: 9/21/2015 © 2000-2016 [employer]. [address] [place] [address]. All rights reserved. This information is not intended as a substitute for professional medical care. Always follow your healthcare professional's instructions.        Treating Corns and Calluses  If your corns or calluses are mild, reducing friction may help. Different shoes, moleskin patches, or soft pads may be all the treatment you need. In more severe cases, treating tissue buildup may require your doctors care. Sometimes custom-made shoe inserts (orthotics) or special pads are prescribed to reduce friction and pressure.    Change shoes  If you have corns, your doctor may suggest wearing shoes that have more toe room. This way, buckled joints are less likely to be pinched against the top of the shoe. If you have calluses, wearing a cushioned insole, arch support, or heel counter can help reduce friction.  Visit your doctor  In some cases, your doctor may trim away the outer layers of skin that make up the corn or callus.  For a painful corn, medicine may be injected beneath the built-up tissue.  Wear orthotics  Orthotics are specially made to meet the needs of your feet. They cushion calluses or divert pressure away from these problem areas. Worn as directed, orthotics help limit existing problems and prevent new ones from forming.  If you need surgery  If a bone or joint is out of place, certain parts of your foot may be under too much pressure. This can cause severe corns and calluses. In such cases, surgery may be the best way to correct the problem.  Outpatient procedures  In most cases, surgery to improve bone position is an outpatient procedure. Your doctor may cut away excess bone, reposition prominent bones, or even fuse joints. Sometimes tendons or ligaments are cut to reduce tension on a bone or joint. Your doctor will talk with you about the procedure that is best suited to your needs.  Date Last Reviewed: 7/1/2016  © 2750-0822 [employer]. [address] [place] [address], [place] [address]. All rights reserved. This information is not intended as a substitute for professional medical care. Always follow your healthcare professional's instructions.

## 2020-03-03 NOTE — PROGRESS NOTES
Subjective:      Patient ID: Chano Guillen is a 79 y.o. male.    Chief Complaint: Foot Pain (ov 2/20 Leigha pcp- left foot 5th toe) and Callouses (5th toe)    Chano Guillen is a 79 y.o. male who presents to the podiatry clinic  with complaint of  left foot pain, especially with ambulation certain shoes and palpation. Description: moderate Nature: aching and sharp Location:  5th digit Onset of the symptoms was several months ago. Precipitating event: Increased activity in flexible tennis shoes.  History of injury: no Current symptoms include: ability to bear weight, but with some pain. Alleviating factors: rest and spacers Symptoms have progressed to a point and plateaued. Patient has had no prior foot problems. Evaluation to date: none. Treatment to date: Over-the-counter spacers and lamb's wool. Patients rates pain 10/10 on pain scale.    Shoe gear: Casual shoes  Hours on Feet: 6+    Patient Active Problem List   Diagnosis    Vertigo    Fatigue    Ataxia    Peripheral neuropathy    Ptosis, bilateral    Imbalance    Abnormal eye movements    Impaired functional mobility, balance, gait, and endurance    Vestibular dizziness    Sinus pressure       Current Outpatient Medications on File Prior to Visit   Medication Sig Dispense Refill    amLODIPine (NORVASC) 10 MG tablet TK 1 T PO QD  5    amlodipine (NORVASC) 5 MG tablet Take 10 mg by mouth once daily.       aspirin (ECOTRIN) 81 MG EC tablet Take 81 mg by mouth once daily.      cyanocobalamin, vitamin B-12, 1,000 mcg TbSR Take 1,000 mcg by mouth once daily.  0    finasteride (PROSCAR) 5 mg tablet Take 5 mg by mouth once daily.      lisinopril (PRINIVIL,ZESTRIL) 40 MG tablet Take 40 mg by mouth once daily.      montelukast (SINGULAIR) 10 mg tablet Take 10 mg by mouth every evening.      MULTIVIT-MINERALS/FERROUS FUM (MULTI VITAMIN ORAL) Take by mouth.      omeprazole (PRILOSEC) 20 MG capsule Take 20 mg by mouth once daily.      oxybutynin  (DITROPAN) 5 MG Tab Take 5 mg by mouth 3 (three) times daily.      sod bicarb-sod chlor-neti pot (SINUS WASH NETIPOT) pkdv 1 packet by Nasal route 2 (two) times daily as needed (nasal congestion).      VITAMIN D2 50,000 unit capsule 5,000 Units.   0     No current facility-administered medications on file prior to visit.        Review of patient's allergies indicates:  No Known Allergies    Past Surgical History:   Procedure Laterality Date    CATARACT EXTRACTION      cysto/turp      CYSTOSCOPY         History reviewed. No pertinent family history.    Social History     Socioeconomic History    Marital status:      Spouse name: Not on file    Number of children: Not on file    Years of education: Not on file    Highest education level: Not on file   Occupational History    Not on file   Social Needs    Financial resource strain: Not on file    Food insecurity:     Worry: Not on file     Inability: Not on file    Transportation needs:     Medical: Not on file     Non-medical: Not on file   Tobacco Use    Smoking status: Never Smoker    Smokeless tobacco: Never Used   Substance and Sexual Activity    Alcohol use: No    Drug use: No    Sexual activity: Not on file   Lifestyle    Physical activity:     Days per week: Not on file     Minutes per session: Not on file    Stress: Not on file   Relationships    Social connections:     Talks on phone: Not on file     Gets together: Not on file     Attends [employer] service: Not on file     Active member of club or organization: Not on file     Attends meetings of clubs or organizations: Not on file     Relationship status: Not on file   Other Topics Concern    Not on file   Social History Narrative    Not on file       Review of Systems   Constitution: Negative for chills and fever.   Cardiovascular: Negative for claudication and leg swelling.   Respiratory: Negative for cough and shortness of breath.    Skin: Positive for dry skin and suspicious  "lesions. Negative for itching and rash.   Musculoskeletal: Positive for joint pain and myalgias. Negative for falls, joint swelling and muscle weakness.   Gastrointestinal: Negative for diarrhea, nausea and vomiting.   Neurological: Negative for numbness, paresthesias, tremors and weakness.   Psychiatric/Behavioral: Negative for altered mental status and hallucinations.           Objective:      Vitals:    03/03/20 0809   BP: (!) 158/84   Pulse: (!) 53   Weight: 81.3 kg (179 lb 3.7 oz)   Height: 5' 8" (1.727 m)   PainSc: 10-Worst pain ever       Physical Exam   Constitutional: He appears well-developed and well-nourished.  Non-toxic appearance. He does not have a sickly appearance. No distress.   alert and oriented x 3.    Cardiovascular:   Pulses:       Dorsalis pedis pulses are 2+ on the right side, and 2+ on the left side.        Posterior tibial pulses are 2+ on the right side, and 2+ on the left side.    Capillary refill time is within normal limits. Digital hair present.    Pulmonary/Chest: No respiratory distress.   Musculoskeletal: He exhibits no deformity.        Right ankle: No tenderness. No lateral malleolus, no medial malleolus, no AITFL, no CF ligament and no posterior TFL tenderness found. Achilles tendon exhibits no pain, no defect and normal Anthony's test results.        Left ankle: No tenderness. No lateral malleolus, no medial malleolus, no AITFL, no CF ligament and no posterior TFL tenderness found. Achilles tendon exhibits no pain, no defect and normal Anthony's test results.        Right foot: There is no tenderness and no bony tenderness.        Left foot: There is tenderness (4th interspace). There is no bony tenderness.    Muscle strength is 5/5 in all groups bilaterally.    There is equinus deformity bilateral with decreased dorsiflexion at the ankle joint bilateral.  Gait analysis reveals excessive pronation through midstance and propulsion with early heel off.    Decreased first MPJ " range of motion both weightbearing and nonweightbearing, no crepitus observed the first MP joint, + dorsal flag sign. Mild  bunion deformity is observed .    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 5 sites sensed.   Left Foot:   Protective Sensation: 5 sites tested. 5 sites sensed.   Lymphadenopathy:   No lymphatic streaking     Neurological: He displays no atrophy. No sensory deficit.   Light touch present     Skin: Skin is warm, dry and intact. Lesion noted. No rash noted. He is not diaphoretic. No cyanosis. No pallor. Nails show no clubbing.   Skin is of normal turgor.   Normal temperature gradient.  Examination of the skin reveals no evidence of significant rashes, open lesions, suspicious appearing nevi or other concerning lesions.     Focal hyperkeratotic lesion with painful central core consisting entirely of hyperkeratotic tissue without open skin, drainage, pus, fluctuance, malodor, or signs of infection: 4th interspace of the left foot           Psychiatric: His mood appears not anxious. His affect is not inappropriate. His speech is not slurred. He is not combative. He is communicative. He is attentive.   Nursing note and vitals reviewed.            Assessment:       Encounter Diagnoses   Name Primary?    Toe pain, left Yes    Heloma molle          Plan:     Problem List Items Addressed This Visit     None      Visit Diagnoses     Toe pain, left    -  Primary    Heloma molle               I counseled the patient on his conditions, their implications and medical management.      -- Discussed biomechanical deformity correction surgically vs conservative management     -- Conservative treatments for hammer digit discussed include padding, hammertoe crest  Pads, extra-depth shoes; Surgical treatments discussed, including hammertoe correction and generic post-op course. All questions answered. Patient opting to begin with  conservative options first.      --  Patient was advised use of a pumice stone, and skin emollients to slow the progression of callus formation.     -- Patient instructed on adequate icing techniques. Patient should ice the affected area at least once per day x 10 minutes for 10 days .     --Dispensed information on proper shoe fit and modification including inserts, spacers dispensed    --Pt to RTC as needed as procedure B for trimming of callus or if he wishes to pursue surgical intervention.

## 2020-06-06 NOTE — PROGRESS NOTES
Mother is calling for advice regarding nasal congestion.   Pt had a nosebleed last night. Has had no further nosebleeds.   Coughing. Used albuterol via nebulizer last night.   Today he reports to mother that he feels like he is having a little bit of breathing difficulty.   Denies fever. Mother states he is not acting ill, is walking around and is just fine.   Denies known exposure to COVID.     Mother instructed to given pt another breathing treatment now for cough and breathing difficulty, and continue regularly. If he is not able to get relief from albuterol, or if he needs to have albuterol sooner than 4 hours, he will need to seek medical attention. May try OTC nasal spray for nasal congestion. Can try an OTC saline gel if he has more nosebleeds. Call back if not better or if in need more advice.   Mother asks that nasal spray order be sent to pharmacy. Saline nasal spray sent.    Patient examined, record reviewed, agree with findings and recommendations as documented above.

## 2021-04-15 ENCOUNTER — PATIENT MESSAGE (OUTPATIENT)
Dept: RESEARCH | Facility: HOSPITAL | Age: 81
End: 2021-04-15

## 2021-09-19 ENCOUNTER — HOSPITAL ENCOUNTER (OUTPATIENT)
Facility: HOSPITAL | Age: 81
Discharge: HOME | End: 2021-09-20
Attending: EMERGENCY MEDICINE | Admitting: INTERNAL MEDICINE
Payer: MEDICARE

## 2021-09-19 DIAGNOSIS — E87.6 HYPOKALEMIA: ICD-10-CM

## 2021-09-19 DIAGNOSIS — R00.1 BRADYCARDIA: ICD-10-CM

## 2021-09-19 DIAGNOSIS — R79.89 ELEVATED TROPONIN: Primary | ICD-10-CM

## 2021-09-19 DIAGNOSIS — R07.9 CHEST PAIN: ICD-10-CM

## 2021-09-19 DIAGNOSIS — R53.1 WEAKNESS: ICD-10-CM

## 2021-09-19 DIAGNOSIS — E87.1 HYPONATREMIA: ICD-10-CM

## 2021-09-19 DIAGNOSIS — R53.1 GENERALIZED WEAKNESS: ICD-10-CM

## 2021-09-19 PROBLEM — I16.0 HYPERTENSIVE URGENCY: Status: ACTIVE | Noted: 2021-09-19

## 2021-09-19 LAB
ALBUMIN SERPL BCP-MCNC: 4.1 G/DL (ref 3.5–5.2)
ALP SERPL-CCNC: 36 U/L (ref 55–135)
ALT SERPL W/O P-5'-P-CCNC: 38 U/L (ref 10–44)
ANION GAP SERPL CALC-SCNC: 13 MMOL/L (ref 8–16)
AST SERPL-CCNC: 37 U/L (ref 10–40)
BASOPHILS # BLD AUTO: 0.03 K/UL (ref 0–0.2)
BASOPHILS NFR BLD: 0.7 % (ref 0–1.9)
BILIRUB SERPL-MCNC: 0.5 MG/DL (ref 0.1–1)
BILIRUB UR QL STRIP: NEGATIVE
BNP SERPL-MCNC: 25 PG/ML (ref 0–99)
BUN SERPL-MCNC: 11 MG/DL (ref 8–23)
CALCIUM SERPL-MCNC: 10.1 MG/DL (ref 8.7–10.5)
CHLORIDE SERPL-SCNC: 97 MMOL/L (ref 95–110)
CLARITY UR REFRACT.AUTO: CLEAR
CO2 SERPL-SCNC: 24 MMOL/L (ref 23–29)
COLOR UR AUTO: NORMAL
CREAT SERPL-MCNC: 0.9 MG/DL (ref 0.5–1.4)
CTP QC/QA: YES
DIFFERENTIAL METHOD: ABNORMAL
EOSINOPHIL # BLD AUTO: 0.1 K/UL (ref 0–0.5)
EOSINOPHIL NFR BLD: 1.9 % (ref 0–8)
ERYTHROCYTE [DISTWIDTH] IN BLOOD BY AUTOMATED COUNT: 13.2 % (ref 11.5–14.5)
EST. GFR  (AFRICAN AMERICAN): >60 ML/MIN/1.73 M^2
EST. GFR  (NON AFRICAN AMERICAN): >60 ML/MIN/1.73 M^2
GLUCOSE SERPL-MCNC: 104 MG/DL (ref 70–110)
GLUCOSE UR QL STRIP: NEGATIVE
HCT VFR BLD AUTO: 35.2 % (ref 40–54)
HGB BLD-MCNC: 12.5 G/DL (ref 14–18)
HGB UR QL STRIP: NEGATIVE
IMM GRANULOCYTES # BLD AUTO: 0.01 K/UL (ref 0–0.04)
IMM GRANULOCYTES NFR BLD AUTO: 0.2 % (ref 0–0.5)
KETONES UR QL STRIP: NEGATIVE
LACTATE SERPL-SCNC: 0.8 MMOL/L (ref 0.5–2.2)
LEUKOCYTE ESTERASE UR QL STRIP: NEGATIVE
LYMPHOCYTES # BLD AUTO: 1.4 K/UL (ref 1–4.8)
LYMPHOCYTES NFR BLD: 33.6 % (ref 18–48)
MCH RBC QN AUTO: 30.4 PG (ref 27–31)
MCHC RBC AUTO-ENTMCNC: 35.5 G/DL (ref 32–36)
MCV RBC AUTO: 86 FL (ref 82–98)
MONOCYTES # BLD AUTO: 0.4 K/UL (ref 0.3–1)
MONOCYTES NFR BLD: 10.6 % (ref 4–15)
NEUTROPHILS # BLD AUTO: 2.2 K/UL (ref 1.8–7.7)
NEUTROPHILS NFR BLD: 53 % (ref 38–73)
NITRITE UR QL STRIP: NEGATIVE
NRBC BLD-RTO: 0 /100 WBC
PH UR STRIP: 7 [PH] (ref 5–8)
PLATELET # BLD AUTO: 222 K/UL (ref 150–450)
PMV BLD AUTO: 8.5 FL (ref 9.2–12.9)
POTASSIUM SERPL-SCNC: 3.3 MMOL/L (ref 3.5–5.1)
PROT SERPL-MCNC: 7.5 G/DL (ref 6–8.4)
PROT UR QL STRIP: NEGATIVE
RBC # BLD AUTO: 4.11 M/UL (ref 4.6–6.2)
SARS-COV-2 RDRP RESP QL NAA+PROBE: NEGATIVE
SODIUM SERPL-SCNC: 134 MMOL/L (ref 136–145)
SP GR UR STRIP: 1 (ref 1–1.03)
TROPONIN I SERPL DL<=0.01 NG/ML-MCNC: 0.03 NG/ML (ref 0–0.03)
URN SPEC COLLECT METH UR: NORMAL
WBC # BLD AUTO: 4.17 K/UL (ref 3.9–12.7)

## 2021-09-19 PROCEDURE — 93005 ELECTROCARDIOGRAM TRACING: CPT

## 2021-09-19 PROCEDURE — 84484 ASSAY OF TROPONIN QUANT: CPT | Performed by: PHYSICIAN ASSISTANT

## 2021-09-19 PROCEDURE — 83605 ASSAY OF LACTIC ACID: CPT | Performed by: PHYSICIAN ASSISTANT

## 2021-09-19 PROCEDURE — 96375 TX/PRO/DX INJ NEW DRUG ADDON: CPT

## 2021-09-19 PROCEDURE — U0002 COVID-19 LAB TEST NON-CDC: HCPCS | Performed by: PHYSICIAN ASSISTANT

## 2021-09-19 PROCEDURE — 84484 ASSAY OF TROPONIN QUANT: CPT | Mod: 91 | Performed by: PHYSICIAN ASSISTANT

## 2021-09-19 PROCEDURE — 83880 ASSAY OF NATRIURETIC PEPTIDE: CPT | Performed by: PHYSICIAN ASSISTANT

## 2021-09-19 PROCEDURE — 85025 COMPLETE CBC W/AUTO DIFF WBC: CPT | Performed by: PHYSICIAN ASSISTANT

## 2021-09-19 PROCEDURE — 80053 COMPREHEN METABOLIC PANEL: CPT | Performed by: PHYSICIAN ASSISTANT

## 2021-09-19 PROCEDURE — 99285 EMERGENCY DEPT VISIT HI MDM: CPT | Mod: CS,,, | Performed by: PHYSICIAN ASSISTANT

## 2021-09-19 PROCEDURE — 63600175 PHARM REV CODE 636 W HCPCS: Performed by: PHYSICIAN ASSISTANT

## 2021-09-19 PROCEDURE — G0378 HOSPITAL OBSERVATION PER HR: HCPCS

## 2021-09-19 PROCEDURE — 93010 ELECTROCARDIOGRAM REPORT: CPT | Mod: ,,, | Performed by: INTERNAL MEDICINE

## 2021-09-19 PROCEDURE — 99285 EMERGENCY DEPT VISIT HI MDM: CPT | Mod: 25

## 2021-09-19 PROCEDURE — 99285 PR EMERGENCY DEPT VISIT,LEVEL V: ICD-10-PCS | Mod: CS,,, | Performed by: PHYSICIAN ASSISTANT

## 2021-09-19 PROCEDURE — 96374 THER/PROPH/DIAG INJ IV PUSH: CPT

## 2021-09-19 PROCEDURE — 81003 URINALYSIS AUTO W/O SCOPE: CPT | Performed by: PHYSICIAN ASSISTANT

## 2021-09-19 PROCEDURE — 93010 EKG 12-LEAD: ICD-10-PCS | Mod: ,,, | Performed by: INTERNAL MEDICINE

## 2021-09-19 PROCEDURE — 96372 THER/PROPH/DIAG INJ SC/IM: CPT | Mod: 59

## 2021-09-19 PROCEDURE — 99220 PR INITIAL OBSERVATION CARE,LEVL III: ICD-10-PCS | Mod: ,,, | Performed by: PHYSICIAN ASSISTANT

## 2021-09-19 PROCEDURE — 99220 PR INITIAL OBSERVATION CARE,LEVL III: CPT | Mod: ,,, | Performed by: PHYSICIAN ASSISTANT

## 2021-09-19 PROCEDURE — 96361 HYDRATE IV INFUSION ADD-ON: CPT

## 2021-09-19 PROCEDURE — 25000003 PHARM REV CODE 250: Performed by: PHYSICIAN ASSISTANT

## 2021-09-19 RX ORDER — ONDANSETRON 8 MG/1
8 TABLET, ORALLY DISINTEGRATING ORAL EVERY 8 HOURS PRN
Status: DISCONTINUED | OUTPATIENT
Start: 2021-09-19 | End: 2021-09-20 | Stop reason: HOSPADM

## 2021-09-19 RX ORDER — AMLODIPINE BESYLATE 5 MG/1
10 TABLET ORAL EVERY EVENING
Status: DISCONTINUED | OUTPATIENT
Start: 2021-09-20 | End: 2021-09-20

## 2021-09-19 RX ORDER — ATORVASTATIN CALCIUM 20 MG/1
80 TABLET, FILM COATED ORAL DAILY
Status: DISCONTINUED | OUTPATIENT
Start: 2021-09-20 | End: 2021-09-20

## 2021-09-19 RX ORDER — MONTELUKAST SODIUM 10 MG/1
10 TABLET ORAL NIGHTLY
Status: DISCONTINUED | OUTPATIENT
Start: 2021-09-20 | End: 2021-09-20 | Stop reason: HOSPADM

## 2021-09-19 RX ORDER — FLUTICASONE PROPIONATE 50 MCG
1 SPRAY, SUSPENSION (ML) NASAL DAILY
Status: DISCONTINUED | OUTPATIENT
Start: 2021-09-20 | End: 2021-09-20 | Stop reason: HOSPADM

## 2021-09-19 RX ORDER — POLYETHYLENE GLYCOL 3350 17 G/17G
17 POWDER, FOR SOLUTION ORAL DAILY PRN
Status: DISCONTINUED | OUTPATIENT
Start: 2021-09-19 | End: 2021-09-20 | Stop reason: HOSPADM

## 2021-09-19 RX ORDER — ENOXAPARIN SODIUM 100 MG/ML
40 INJECTION SUBCUTANEOUS EVERY 24 HOURS
Status: DISCONTINUED | OUTPATIENT
Start: 2021-09-19 | End: 2021-09-20 | Stop reason: HOSPADM

## 2021-09-19 RX ORDER — ACETAMINOPHEN 325 MG/1
650 TABLET ORAL EVERY 4 HOURS PRN
Status: DISCONTINUED | OUTPATIENT
Start: 2021-09-19 | End: 2021-09-20 | Stop reason: HOSPADM

## 2021-09-19 RX ORDER — IPRATROPIUM BROMIDE 42 UG/1
SPRAY, METERED NASAL
COMMUNITY
Start: 2021-07-01

## 2021-09-19 RX ORDER — CETIRIZINE HYDROCHLORIDE 5 MG/1
5 TABLET ORAL NIGHTLY
Status: DISCONTINUED | OUTPATIENT
Start: 2021-09-20 | End: 2021-09-20 | Stop reason: HOSPADM

## 2021-09-19 RX ORDER — LATANOPROST 50 UG/ML
1 SOLUTION/ DROPS OPHTHALMIC NIGHTLY
Status: DISCONTINUED | OUTPATIENT
Start: 2021-09-20 | End: 2021-09-20 | Stop reason: HOSPADM

## 2021-09-19 RX ORDER — PROMETHAZINE HYDROCHLORIDE 25 MG/1
25 TABLET ORAL EVERY 6 HOURS PRN
Status: DISCONTINUED | OUTPATIENT
Start: 2021-09-19 | End: 2021-09-20 | Stop reason: HOSPADM

## 2021-09-19 RX ORDER — GLUCAGON 1 MG
1 KIT INJECTION
Status: DISCONTINUED | OUTPATIENT
Start: 2021-09-19 | End: 2021-09-20 | Stop reason: HOSPADM

## 2021-09-19 RX ORDER — IBUPROFEN 200 MG
16 TABLET ORAL
Status: DISCONTINUED | OUTPATIENT
Start: 2021-09-19 | End: 2021-09-20 | Stop reason: HOSPADM

## 2021-09-19 RX ORDER — PANTOPRAZOLE SODIUM 40 MG/1
40 TABLET, DELAYED RELEASE ORAL DAILY
Status: DISCONTINUED | OUTPATIENT
Start: 2021-09-20 | End: 2021-09-20

## 2021-09-19 RX ORDER — ASPIRIN 81 MG/1
81 TABLET ORAL DAILY
Status: DISCONTINUED | OUTPATIENT
Start: 2021-09-20 | End: 2021-09-20

## 2021-09-19 RX ORDER — TALC
6 POWDER (GRAM) TOPICAL NIGHTLY PRN
Status: DISCONTINUED | OUTPATIENT
Start: 2021-09-19 | End: 2021-09-20 | Stop reason: HOSPADM

## 2021-09-19 RX ORDER — FINASTERIDE 5 MG/1
5 TABLET, FILM COATED ORAL EVERY EVENING
Status: DISCONTINUED | OUTPATIENT
Start: 2021-09-20 | End: 2021-09-20

## 2021-09-19 RX ORDER — SODIUM CHLORIDE 0.9 % (FLUSH) 0.9 %
10 SYRINGE (ML) INJECTION
Status: DISCONTINUED | OUTPATIENT
Start: 2021-09-19 | End: 2021-09-20 | Stop reason: HOSPADM

## 2021-09-19 RX ORDER — LISINOPRIL 10 MG/1
40 TABLET ORAL EVERY EVENING
Status: DISCONTINUED | OUTPATIENT
Start: 2021-09-20 | End: 2021-09-20

## 2021-09-19 RX ORDER — HYDROCHLOROTHIAZIDE 25 MG/1
25 TABLET ORAL DAILY
Status: DISCONTINUED | OUTPATIENT
Start: 2021-09-20 | End: 2021-09-20

## 2021-09-19 RX ORDER — BISACODYL 10 MG
10 SUPPOSITORY, RECTAL RECTAL DAILY PRN
Status: DISCONTINUED | OUTPATIENT
Start: 2021-09-19 | End: 2021-09-20 | Stop reason: HOSPADM

## 2021-09-19 RX ORDER — IBUPROFEN 200 MG
24 TABLET ORAL
Status: DISCONTINUED | OUTPATIENT
Start: 2021-09-19 | End: 2021-09-20 | Stop reason: HOSPADM

## 2021-09-19 RX ORDER — FLUTICASONE PROPIONATE 50 MCG
SPRAY, SUSPENSION (ML) NASAL
COMMUNITY
Start: 2021-06-15

## 2021-09-19 RX ORDER — IPRATROPIUM BROMIDE AND ALBUTEROL SULFATE 2.5; .5 MG/3ML; MG/3ML
3 SOLUTION RESPIRATORY (INHALATION) EVERY 4 HOURS PRN
Status: DISCONTINUED | OUTPATIENT
Start: 2021-09-19 | End: 2021-09-20

## 2021-09-19 RX ORDER — LATANOPROST 50 UG/ML
SOLUTION/ DROPS OPHTHALMIC
COMMUNITY
Start: 2021-06-10

## 2021-09-19 RX ORDER — ROSUVASTATIN CALCIUM 40 MG/1
40 TABLET, COATED ORAL DAILY
COMMUNITY
Start: 2021-07-22

## 2021-09-19 RX ORDER — HYDROCHLOROTHIAZIDE 25 MG/1
25 TABLET ORAL DAILY
COMMUNITY
Start: 2021-09-16 | End: 2021-11-02

## 2021-09-19 RX ORDER — LANOLIN ALCOHOL/MO/W.PET/CERES
1000 CREAM (GRAM) TOPICAL DAILY
Status: DISCONTINUED | OUTPATIENT
Start: 2021-09-20 | End: 2021-09-20

## 2021-09-19 RX ADMIN — POTASSIUM BICARBONATE 20 MEQ: 391 TABLET, EFFERVESCENT ORAL at 11:09

## 2021-09-19 RX ADMIN — ENOXAPARIN SODIUM 40 MG: 100 INJECTION SUBCUTANEOUS at 11:09

## 2021-09-19 RX ADMIN — SODIUM CHLORIDE 250 ML: 0.9 INJECTION, SOLUTION INTRAVENOUS at 11:09

## 2021-09-20 VITALS
BODY MASS INDEX: 28.07 KG/M2 | OXYGEN SATURATION: 98 % | TEMPERATURE: 99 F | HEART RATE: 51 BPM | WEIGHT: 185.19 LBS | RESPIRATION RATE: 18 BRPM | HEIGHT: 68 IN | SYSTOLIC BLOOD PRESSURE: 138 MMHG | DIASTOLIC BLOOD PRESSURE: 70 MMHG

## 2021-09-20 LAB
ANION GAP SERPL CALC-SCNC: 13 MMOL/L (ref 8–16)
ASCENDING AORTA: 2.47 CM
AV INDEX (PROSTH): 0.75
AV MEAN GRADIENT: 4 MMHG
AV PEAK GRADIENT: 9 MMHG
AV VALVE AREA: 2.45 CM2
AV VELOCITY RATIO: 0.72
BASOPHILS # BLD AUTO: 0.04 K/UL (ref 0–0.2)
BASOPHILS NFR BLD: 0.8 % (ref 0–1.9)
BSA FOR ECHO PROCEDURE: 2 M2
BUN SERPL-MCNC: 9 MG/DL (ref 8–23)
CALCIUM SERPL-MCNC: 9.7 MG/DL (ref 8.7–10.5)
CHLORIDE SERPL-SCNC: 99 MMOL/L (ref 95–110)
CHOLEST SERPL-MCNC: 192 MG/DL (ref 120–199)
CHOLEST/HDLC SERPL: 3.1 {RATIO} (ref 2–5)
CO2 SERPL-SCNC: 22 MMOL/L (ref 23–29)
CREAT SERPL-MCNC: 0.8 MG/DL (ref 0.5–1.4)
CV ECHO LV RWT: 0.29 CM
DIFFERENTIAL METHOD: ABNORMAL
DOP CALC AO PEAK VEL: 1.52 M/S
DOP CALC AO VTI: 29.83 CM
DOP CALC LVOT AREA: 3.3 CM2
DOP CALC LVOT DIAMETER: 2.04 CM
DOP CALC LVOT PEAK VEL: 1.09 M/S
DOP CALC LVOT STROKE VOLUME: 73.05 CM3
DOP CALCLVOT PEAK VEL VTI: 22.36 CM
ECHO LV POSTERIOR WALL: 0.76 CM (ref 0.6–1.1)
EJECTION FRACTION: 60 %
EOSINOPHIL # BLD AUTO: 0.1 K/UL (ref 0–0.5)
EOSINOPHIL NFR BLD: 1.6 % (ref 0–8)
ERYTHROCYTE [DISTWIDTH] IN BLOOD BY AUTOMATED COUNT: 13 % (ref 11.5–14.5)
EST. GFR  (AFRICAN AMERICAN): >60 ML/MIN/1.73 M^2
EST. GFR  (NON AFRICAN AMERICAN): >60 ML/MIN/1.73 M^2
ESTIMATED AVG GLUCOSE: 114 MG/DL (ref 68–131)
FRACTIONAL SHORTENING: 36 % (ref 28–44)
GLUCOSE SERPL-MCNC: 101 MG/DL (ref 70–110)
HBA1C MFR BLD: 5.6 % (ref 4–5.6)
HCT VFR BLD AUTO: 34.6 % (ref 40–54)
HDLC SERPL-MCNC: 62 MG/DL (ref 40–75)
HDLC SERPL: 32.3 % (ref 20–50)
HGB BLD-MCNC: 11.7 G/DL (ref 14–18)
IMM GRANULOCYTES # BLD AUTO: 0.01 K/UL (ref 0–0.04)
IMM GRANULOCYTES NFR BLD AUTO: 0.2 % (ref 0–0.5)
INTERVENTRICULAR SEPTUM: 0.93 CM (ref 0.6–1.1)
LA MAJOR: 4.76 CM
LA MINOR: 5.3 CM
LA WIDTH: 3.71 CM
LDLC SERPL CALC-MCNC: 121.2 MG/DL (ref 63–159)
LEFT ATRIUM SIZE: 4.36 CM
LEFT ATRIUM VOLUME INDEX MOD: 27.9 ML/M2
LEFT ATRIUM VOLUME INDEX: 35 ML/M2
LEFT ATRIUM VOLUME MOD: 54.97 CM3
LEFT ATRIUM VOLUME: 68.96 CM3
LEFT INTERNAL DIMENSION IN SYSTOLE: 3.3 CM (ref 2.1–4)
LEFT VENTRICLE DIASTOLIC VOLUME INDEX: 64.47 ML/M2
LEFT VENTRICLE DIASTOLIC VOLUME: 127.01 ML
LEFT VENTRICLE MASS INDEX: 78 G/M2
LEFT VENTRICLE SYSTOLIC VOLUME INDEX: 22.5 ML/M2
LEFT VENTRICLE SYSTOLIC VOLUME: 44.28 ML
LEFT VENTRICULAR INTERNAL DIMENSION IN DIASTOLE: 5.16 CM (ref 3.5–6)
LEFT VENTRICULAR MASS: 153.71 G
LYMPHOCYTES # BLD AUTO: 2 K/UL (ref 1–4.8)
LYMPHOCYTES NFR BLD: 39.8 % (ref 18–48)
MAGNESIUM SERPL-MCNC: 1.9 MG/DL (ref 1.6–2.6)
MCH RBC QN AUTO: 29.8 PG (ref 27–31)
MCHC RBC AUTO-ENTMCNC: 33.8 G/DL (ref 32–36)
MCV RBC AUTO: 88 FL (ref 82–98)
MONOCYTES # BLD AUTO: 0.5 K/UL (ref 0.3–1)
MONOCYTES NFR BLD: 9.9 % (ref 4–15)
NEUTROPHILS # BLD AUTO: 2.4 K/UL (ref 1.8–7.7)
NEUTROPHILS NFR BLD: 47.7 % (ref 38–73)
NONHDLC SERPL-MCNC: 130 MG/DL
NRBC BLD-RTO: 0 /100 WBC
PHOSPHATE SERPL-MCNC: 3.1 MG/DL (ref 2.7–4.5)
PISA TR MAX VEL: 2.42 M/S
PLATELET # BLD AUTO: 240 K/UL (ref 150–450)
PMV BLD AUTO: 9.5 FL (ref 9.2–12.9)
POTASSIUM SERPL-SCNC: 3.4 MMOL/L (ref 3.5–5.1)
RA MAJOR: 4.58 CM
RA PRESSURE: 3 MMHG
RA WIDTH: 3.2 CM
RBC # BLD AUTO: 3.93 M/UL (ref 4.6–6.2)
RIGHT VENTRICULAR END-DIASTOLIC DIMENSION: 4.86 CM
RV TISSUE DOPPLER FREE WALL SYSTOLIC VELOCITY 1 (APICAL 4 CHAMBER VIEW): 13.48 CM/S
SINUS: 3 CM
SODIUM SERPL-SCNC: 134 MMOL/L (ref 136–145)
STJ: 2.45 CM
TDI LATERAL: 0.09 M/S
TDI SEPTAL: 0.06 M/S
TDI: 0.08 M/S
TR MAX PG: 23 MMHG
TRICUSPID ANNULAR PLANE SYSTOLIC EXCURSION: 3.31 CM
TRIGL SERPL-MCNC: 44 MG/DL (ref 30–150)
TROPONIN I SERPL DL<=0.01 NG/ML-MCNC: 0.02 NG/ML (ref 0–0.03)
TROPONIN I SERPL DL<=0.01 NG/ML-MCNC: 0.02 NG/ML (ref 0–0.03)
TV REST PULMONARY ARTERY PRESSURE: 26 MMHG
WBC # BLD AUTO: 4.93 K/UL (ref 3.9–12.7)

## 2021-09-20 PROCEDURE — 80048 BASIC METABOLIC PNL TOTAL CA: CPT | Performed by: PHYSICIAN ASSISTANT

## 2021-09-20 PROCEDURE — 97530 THERAPEUTIC ACTIVITIES: CPT

## 2021-09-20 PROCEDURE — 85025 COMPLETE CBC W/AUTO DIFF WBC: CPT | Performed by: PHYSICIAN ASSISTANT

## 2021-09-20 PROCEDURE — 80061 LIPID PANEL: CPT | Performed by: PHYSICIAN ASSISTANT

## 2021-09-20 PROCEDURE — 99217 PR OBSERVATION CARE DISCHARGE: CPT | Mod: ,,, | Performed by: PHYSICIAN ASSISTANT

## 2021-09-20 PROCEDURE — 93010 EKG 12-LEAD: ICD-10-PCS | Mod: ,,, | Performed by: INTERNAL MEDICINE

## 2021-09-20 PROCEDURE — 84484 ASSAY OF TROPONIN QUANT: CPT | Performed by: PHYSICIAN ASSISTANT

## 2021-09-20 PROCEDURE — 25000242 PHARM REV CODE 250 ALT 637 W/ HCPCS: Performed by: PHYSICIAN ASSISTANT

## 2021-09-20 PROCEDURE — G0378 HOSPITAL OBSERVATION PER HR: HCPCS

## 2021-09-20 PROCEDURE — 97161 PT EVAL LOW COMPLEX 20 MIN: CPT

## 2021-09-20 PROCEDURE — 63600175 PHARM REV CODE 636 W HCPCS: Performed by: PHYSICIAN ASSISTANT

## 2021-09-20 PROCEDURE — 83036 HEMOGLOBIN GLYCOSYLATED A1C: CPT | Performed by: PHYSICIAN ASSISTANT

## 2021-09-20 PROCEDURE — 83735 ASSAY OF MAGNESIUM: CPT | Performed by: PHYSICIAN ASSISTANT

## 2021-09-20 PROCEDURE — 93005 ELECTROCARDIOGRAM TRACING: CPT

## 2021-09-20 PROCEDURE — 93010 ELECTROCARDIOGRAM REPORT: CPT | Mod: ,,, | Performed by: INTERNAL MEDICINE

## 2021-09-20 PROCEDURE — 84100 ASSAY OF PHOSPHORUS: CPT | Performed by: PHYSICIAN ASSISTANT

## 2021-09-20 PROCEDURE — 97165 OT EVAL LOW COMPLEX 30 MIN: CPT

## 2021-09-20 PROCEDURE — 96376 TX/PRO/DX INJ SAME DRUG ADON: CPT | Mod: 59 | Performed by: EMERGENCY MEDICINE

## 2021-09-20 PROCEDURE — 25000003 PHARM REV CODE 250: Performed by: PHYSICIAN ASSISTANT

## 2021-09-20 PROCEDURE — 99217 PR OBSERVATION CARE DISCHARGE: ICD-10-PCS | Mod: ,,, | Performed by: PHYSICIAN ASSISTANT

## 2021-09-20 PROCEDURE — 97535 SELF CARE MNGMENT TRAINING: CPT | Mod: 59

## 2021-09-20 RX ORDER — POTASSIUM CHLORIDE 7.45 MG/ML
40 INJECTION INTRAVENOUS ONCE
Status: DISCONTINUED | OUTPATIENT
Start: 2021-09-20 | End: 2021-09-20

## 2021-09-20 RX ORDER — POTASSIUM CHLORIDE 7.45 MG/ML
10 INJECTION INTRAVENOUS ONCE
Status: COMPLETED | OUTPATIENT
Start: 2021-09-20 | End: 2021-09-20

## 2021-09-20 RX ORDER — AMLODIPINE BESYLATE 5 MG/1
10 TABLET ORAL DAILY
Qty: 60 TABLET | Refills: 11 | Status: SHIPPED | OUTPATIENT
Start: 2021-09-20 | End: 2021-09-28

## 2021-09-20 RX ORDER — LANOLIN ALCOHOL/MO/W.PET/CERES
1000 CREAM (GRAM) TOPICAL NIGHTLY
Status: DISCONTINUED | OUTPATIENT
Start: 2021-09-20 | End: 2021-09-20 | Stop reason: HOSPADM

## 2021-09-20 RX ORDER — HYDROCHLOROTHIAZIDE 25 MG/1
25 TABLET ORAL NIGHTLY
Status: DISCONTINUED | OUTPATIENT
Start: 2021-09-20 | End: 2021-09-20 | Stop reason: HOSPADM

## 2021-09-20 RX ORDER — AMLODIPINE BESYLATE 10 MG/1
10 TABLET ORAL NIGHTLY
Status: DISCONTINUED | OUTPATIENT
Start: 2021-09-20 | End: 2021-09-20 | Stop reason: HOSPADM

## 2021-09-20 RX ORDER — FINASTERIDE 5 MG/1
5 TABLET, FILM COATED ORAL NIGHTLY
Status: DISCONTINUED | OUTPATIENT
Start: 2021-09-20 | End: 2021-09-20 | Stop reason: HOSPADM

## 2021-09-20 RX ORDER — MAGNESIUM SULFATE HEPTAHYDRATE 40 MG/ML
2 INJECTION, SOLUTION INTRAVENOUS ONCE
Status: COMPLETED | OUTPATIENT
Start: 2021-09-20 | End: 2021-09-20

## 2021-09-20 RX ORDER — ATORVASTATIN CALCIUM 40 MG/1
80 TABLET, FILM COATED ORAL NIGHTLY
Status: DISCONTINUED | OUTPATIENT
Start: 2021-09-20 | End: 2021-09-20 | Stop reason: HOSPADM

## 2021-09-20 RX ORDER — LISINOPRIL 20 MG/1
40 TABLET ORAL NIGHTLY
Status: DISCONTINUED | OUTPATIENT
Start: 2021-09-20 | End: 2021-09-20 | Stop reason: HOSPADM

## 2021-09-20 RX ORDER — PANTOPRAZOLE SODIUM 40 MG/1
40 TABLET, DELAYED RELEASE ORAL NIGHTLY
Status: DISCONTINUED | OUTPATIENT
Start: 2021-09-20 | End: 2021-09-20 | Stop reason: HOSPADM

## 2021-09-20 RX ORDER — HYDRALAZINE HYDROCHLORIDE 20 MG/ML
10 INJECTION INTRAMUSCULAR; INTRAVENOUS EVERY 6 HOURS PRN
Status: DISCONTINUED | OUTPATIENT
Start: 2021-09-20 | End: 2021-09-20 | Stop reason: HOSPADM

## 2021-09-20 RX ORDER — ASPIRIN 81 MG/1
81 TABLET ORAL NIGHTLY
Status: DISCONTINUED | OUTPATIENT
Start: 2021-09-20 | End: 2021-09-20 | Stop reason: HOSPADM

## 2021-09-20 RX ADMIN — AMLODIPINE BESYLATE 10 MG: 10 TABLET ORAL at 12:09

## 2021-09-20 RX ADMIN — LISINOPRIL 40 MG: 20 TABLET ORAL at 12:09

## 2021-09-20 RX ADMIN — MAGNESIUM SULFATE 2 G: 2 INJECTION INTRAVENOUS at 10:09

## 2021-09-20 RX ADMIN — POTASSIUM CHLORIDE 10 MEQ: 7.46 INJECTION, SOLUTION INTRAVENOUS at 01:09

## 2021-09-20 RX ADMIN — CETIRIZINE HYDROCHLORIDE 5 MG: 5 TABLET ORAL at 12:09

## 2021-09-20 RX ADMIN — FLUTICASONE PROPIONATE 50 MCG: 50 SPRAY, METERED NASAL at 10:09

## 2021-09-20 RX ADMIN — POTASSIUM CHLORIDE 10 MEQ: 7.46 INJECTION, SOLUTION INTRAVENOUS at 02:09

## 2021-09-20 RX ADMIN — POTASSIUM CHLORIDE 10 MEQ: 10 INJECTION, SOLUTION INTRAVENOUS at 10:09

## 2021-09-28 ENCOUNTER — OFFICE VISIT (OUTPATIENT)
Dept: CARDIOLOGY | Facility: CLINIC | Age: 81
End: 2021-09-28
Payer: MEDICARE

## 2021-09-28 VITALS
BODY MASS INDEX: 26.56 KG/M2 | OXYGEN SATURATION: 99 % | WEIGHT: 175.25 LBS | DIASTOLIC BLOOD PRESSURE: 78 MMHG | HEART RATE: 81 BPM | SYSTOLIC BLOOD PRESSURE: 152 MMHG | HEIGHT: 68 IN

## 2021-09-28 DIAGNOSIS — E05.90 SUBCLINICAL HYPERTHYROIDISM: Primary | ICD-10-CM

## 2021-09-28 DIAGNOSIS — R53.1 GENERALIZED WEAKNESS: ICD-10-CM

## 2021-09-28 DIAGNOSIS — I10 ESSENTIAL HYPERTENSION: ICD-10-CM

## 2021-09-28 DIAGNOSIS — R07.9 CHEST PAIN, UNSPECIFIED TYPE: ICD-10-CM

## 2021-09-28 PROCEDURE — 99999 PR PBB SHADOW E&M-EST. PATIENT-LVL IV: CPT | Mod: PBBFAC,GC,, | Performed by: INTERNAL MEDICINE

## 2021-09-28 PROCEDURE — 99999 PR PBB SHADOW E&M-EST. PATIENT-LVL IV: ICD-10-PCS | Mod: PBBFAC,GC,, | Performed by: INTERNAL MEDICINE

## 2021-09-28 PROCEDURE — 99204 OFFICE O/P NEW MOD 45 MIN: CPT | Mod: GC,S$GLB,, | Performed by: INTERNAL MEDICINE

## 2021-09-28 PROCEDURE — 99204 PR OFFICE/OUTPT VISIT, NEW, LEVL IV, 45-59 MIN: ICD-10-PCS | Mod: GC,S$GLB,, | Performed by: INTERNAL MEDICINE

## 2021-09-28 RX ORDER — ALENDRONATE SODIUM 70 MG/1
70 TABLET ORAL
COMMUNITY
Start: 2021-06-14

## 2021-09-28 RX ORDER — AMLODIPINE BESYLATE 10 MG/1
10 TABLET ORAL DAILY
Qty: 30 TABLET | Refills: 11 | Status: SHIPPED | OUTPATIENT
Start: 2021-09-28 | End: 2022-09-28

## 2021-09-30 ENCOUNTER — CARE AT HOME (OUTPATIENT)
Dept: HOME HEALTH SERVICES | Facility: CLINIC | Age: 81
End: 2021-09-30
Payer: MEDICARE

## 2021-09-30 DIAGNOSIS — R53.1 GENERALIZED WEAKNESS: ICD-10-CM

## 2021-09-30 PROCEDURE — 99499 UNLISTED E&M SERVICE: CPT | Mod: S$GLB,,, | Performed by: NURSE PRACTITIONER

## 2021-09-30 PROCEDURE — 99499 NO LOS: ICD-10-PCS | Mod: S$GLB,,, | Performed by: NURSE PRACTITIONER

## 2021-10-01 ENCOUNTER — TELEPHONE (OUTPATIENT)
Dept: CARDIOLOGY | Facility: CLINIC | Age: 81
End: 2021-10-01

## 2021-10-05 ENCOUNTER — HOSPITAL ENCOUNTER (OUTPATIENT)
Dept: CARDIOLOGY | Facility: HOSPITAL | Age: 81
Discharge: HOME | End: 2021-10-05
Attending: INTERNAL MEDICINE
Payer: MEDICARE

## 2021-10-05 VITALS
WEIGHT: 175 LBS | BODY MASS INDEX: 26.52 KG/M2 | DIASTOLIC BLOOD PRESSURE: 86 MMHG | HEART RATE: 73 BPM | SYSTOLIC BLOOD PRESSURE: 168 MMHG | HEIGHT: 68 IN

## 2021-10-05 DIAGNOSIS — R07.9 CHEST PAIN, UNSPECIFIED TYPE: ICD-10-CM

## 2021-10-05 LAB
CV PHARM DOSE: 0.4 MG
CV STRESS BASE HR: 73 BPM
DIASTOLIC BLOOD PRESSURE: 86 MMHG
EJECTION FRACTION- HIGH: 65 %
END DIASTOLIC INDEX-HIGH: 153 ML/M2
END DIASTOLIC INDEX-LOW: 93 ML/M2
END SYSTOLIC INDEX-HIGH: 71 ML/M2
END SYSTOLIC INDEX-LOW: 31 ML/M2
NUC REST DIASTOLIC VOLUME INDEX: 80
NUC REST EJECTION FRACTION: 54
NUC REST SYSTOLIC VOLUME INDEX: 37
NUC STRESS DIASTOLIC VOLUME INDEX: 73
NUC STRESS EJECTION FRACTION: 63 %
NUC STRESS SYSTOLIC VOLUME INDEX: 27
OHS CV CPX 1 MINUTE RECOVERY HEART RATE: 88 BPM
OHS CV CPX 85 PERCENT MAX PREDICTED HEART RATE MALE: 116
OHS CV CPX MAX PREDICTED HEART RATE: 137
OHS CV CPX PATIENT IS FEMALE: 0
OHS CV CPX PATIENT IS MALE: 1
OHS CV CPX PEAK DIASTOLIC BLOOD PRESSURE: 84 MMHG
OHS CV CPX PEAK HEAR RATE: 82 BPM
OHS CV CPX PEAK RATE PRESSURE PRODUCT: NORMAL
OHS CV CPX PEAK SYSTOLIC BLOOD PRESSURE: 180 MMHG
OHS CV CPX PERCENT MAX PREDICTED HEART RATE ACHIEVED: 60
OHS CV CPX RATE PRESSURE PRODUCT PRESENTING: NORMAL
RETIRED EF AND QEF - SEE NOTES: 53 %
SUMMED DIFFERENCE: 0
SUMMED REST SCORE: 1
SUMMED STRESS SCORE: 1
SYSTOLIC BLOOD PRESSURE: 168 MMHG

## 2021-10-05 PROCEDURE — 93018 CV STRESS TEST I&R ONLY: CPT | Mod: ,,, | Performed by: INTERNAL MEDICINE

## 2021-10-05 PROCEDURE — 78452 HT MUSCLE IMAGE SPECT MULT: CPT | Mod: 26,,, | Performed by: INTERNAL MEDICINE

## 2021-10-05 PROCEDURE — 78452 HT MUSCLE IMAGE SPECT MULT: CPT

## 2021-10-05 PROCEDURE — 93018 STRESS TEST WITH MYOCARDIAL PERFUSION (CUPID ONLY): ICD-10-PCS | Mod: ,,, | Performed by: INTERNAL MEDICINE

## 2021-10-05 PROCEDURE — 93016 STRESS TEST WITH MYOCARDIAL PERFUSION (CUPID ONLY): ICD-10-PCS | Mod: ,,, | Performed by: INTERNAL MEDICINE

## 2021-10-05 PROCEDURE — 63600175 PHARM REV CODE 636 W HCPCS: Performed by: INTERNAL MEDICINE

## 2021-10-05 PROCEDURE — 78452 STRESS TEST WITH MYOCARDIAL PERFUSION (CUPID ONLY): ICD-10-PCS | Mod: 26,,, | Performed by: INTERNAL MEDICINE

## 2021-10-05 PROCEDURE — 93016 CV STRESS TEST SUPVJ ONLY: CPT | Mod: ,,, | Performed by: INTERNAL MEDICINE

## 2021-10-05 PROCEDURE — A9502 TC99M TETROFOSMIN: HCPCS

## 2021-10-05 RX ORDER — REGADENOSON 0.08 MG/ML
0.4 INJECTION, SOLUTION INTRAVENOUS
Status: COMPLETED | OUTPATIENT
Start: 2021-10-05 | End: 2021-10-05

## 2021-10-05 RX ADMIN — REGADENOSON 0.4 MG: 0.08 INJECTION, SOLUTION INTRAVENOUS at 10:10

## 2021-10-12 DIAGNOSIS — R07.9 CHEST PAIN, UNSPECIFIED TYPE: ICD-10-CM

## 2021-10-12 DIAGNOSIS — R94.39 EQUIVOCAL STRESS TEST: Primary | ICD-10-CM

## 2021-11-02 ENCOUNTER — OFFICE VISIT (OUTPATIENT)
Dept: CARDIOLOGY | Facility: CLINIC | Age: 81
End: 2021-11-02
Payer: MEDICARE

## 2021-11-02 ENCOUNTER — LAB VISIT (OUTPATIENT)
Dept: LAB | Facility: HOSPITAL | Age: 81
End: 2021-11-02
Attending: INTERNAL MEDICINE
Payer: MEDICARE

## 2021-11-02 VITALS
HEART RATE: 66 BPM | SYSTOLIC BLOOD PRESSURE: 156 MMHG | HEIGHT: 68 IN | BODY MASS INDEX: 27.33 KG/M2 | DIASTOLIC BLOOD PRESSURE: 75 MMHG | WEIGHT: 180.31 LBS

## 2021-11-02 DIAGNOSIS — E05.90 SUBCLINICAL HYPERTHYROIDISM: Primary | ICD-10-CM

## 2021-11-02 DIAGNOSIS — E05.90 SUBCLINICAL HYPERTHYROIDISM: ICD-10-CM

## 2021-11-02 LAB
T3FREE SERPL-MCNC: 2.4 PG/ML (ref 2.3–4.2)
T4 FREE SERPL-MCNC: 0.86 NG/DL (ref 0.71–1.51)
TSH SERPL DL<=0.005 MIU/L-ACNC: 0.16 UIU/ML (ref 0.4–4)

## 2021-11-02 PROCEDURE — 99999 PR PBB SHADOW E&M-EST. PATIENT-LVL IV: ICD-10-PCS | Mod: PBBFAC,GC,, | Performed by: INTERNAL MEDICINE

## 2021-11-02 PROCEDURE — 99213 OFFICE O/P EST LOW 20 MIN: CPT | Mod: GC,S$GLB,, | Performed by: INTERNAL MEDICINE

## 2021-11-02 PROCEDURE — 99999 PR PBB SHADOW E&M-EST. PATIENT-LVL IV: CPT | Mod: PBBFAC,GC,, | Performed by: INTERNAL MEDICINE

## 2021-11-02 PROCEDURE — 84443 ASSAY THYROID STIM HORMONE: CPT | Performed by: INTERNAL MEDICINE

## 2021-11-02 PROCEDURE — 84481 FREE ASSAY (FT-3): CPT | Performed by: INTERNAL MEDICINE

## 2021-11-02 PROCEDURE — 84439 ASSAY OF FREE THYROXINE: CPT | Performed by: INTERNAL MEDICINE

## 2021-11-02 PROCEDURE — 99213 PR OFFICE/OUTPT VISIT, EST, LEVL III, 20-29 MIN: ICD-10-PCS | Mod: GC,S$GLB,, | Performed by: INTERNAL MEDICINE

## 2021-11-02 PROCEDURE — 36415 COLL VENOUS BLD VENIPUNCTURE: CPT | Performed by: INTERNAL MEDICINE

## 2021-11-19 ENCOUNTER — TELEPHONE (OUTPATIENT)
Dept: CARDIOLOGY | Facility: HOSPITAL | Age: 81
End: 2021-11-19
Payer: MEDICARE

## 2021-11-23 ENCOUNTER — HOSPITAL ENCOUNTER (OUTPATIENT)
Dept: CARDIOLOGY | Facility: HOSPITAL | Age: 81
Discharge: HOME | End: 2021-11-23
Attending: INTERNAL MEDICINE
Payer: MEDICARE

## 2021-11-23 VITALS
WEIGHT: 180 LBS | SYSTOLIC BLOOD PRESSURE: 192 MMHG | BODY MASS INDEX: 27.28 KG/M2 | HEART RATE: 86 BPM | HEIGHT: 68 IN | DIASTOLIC BLOOD PRESSURE: 102 MMHG

## 2021-11-23 DIAGNOSIS — R94.39 EQUIVOCAL STRESS TEST: ICD-10-CM

## 2021-11-23 DIAGNOSIS — R07.9 CHEST PAIN, UNSPECIFIED TYPE: ICD-10-CM

## 2021-11-23 LAB
CFR FLOW - ANTERIOR: 1.32
CFR FLOW - INFERIOR: 1.38
CFR FLOW - LATERAL: 1.13
CFR FLOW - MAX: 1.71
CFR FLOW - MIN: 0.81
CFR FLOW - SEPTAL: 1.33
CFR FLOW - WHOLE HEART: 1.29
CV PHARM DOSE: 45.8 MG
CV STRESS BASE HR: 86 BPM
DIASTOLIC BLOOD PRESSURE: 102 MMHG
EJECTION FRACTION- HIGH: 65 %
END DIASTOLIC INDEX-HIGH: 153 ML/M2
END DIASTOLIC INDEX-LOW: 93 ML/M2
END SYSTOLIC INDEX-HIGH: 71 ML/M2
END SYSTOLIC INDEX-LOW: 31 ML/M2
NUC REST DIASTOLIC VOLUME INDEX: 142
NUC REST EJECTION FRACTION: 63
NUC REST SYSTOLIC VOLUME INDEX: 52
NUC STRESS DIASTOLIC VOLUME INDEX: 143
NUC STRESS EJECTION FRACTION: 71 %
NUC STRESS SYSTOLIC VOLUME INDEX: 42
OHS CV CPX 85 PERCENT MAX PREDICTED HEART RATE MALE: 116
OHS CV CPX MAX PREDICTED HEART RATE: 137
OHS CV CPX PATIENT IS FEMALE: 0
OHS CV CPX PATIENT IS MALE: 1
OHS CV CPX PEAK DIASTOLIC BLOOD PRESSURE: 70 MMHG
OHS CV CPX PEAK HEAR RATE: 85 BPM
OHS CV CPX PEAK RATE PRESSURE PRODUCT: NORMAL
OHS CV CPX PEAK SYSTOLIC BLOOD PRESSURE: 158 MMHG
OHS CV CPX PERCENT MAX PREDICTED HEART RATE ACHIEVED: 62
OHS CV CPX RATE PRESSURE PRODUCT PRESENTING: NORMAL
REST FLOW - ANTERIOR: 1.77 CC/MIN/G
REST FLOW - INFERIOR: 1.26 CC/MIN/G
REST FLOW - LATERAL: 2.06 CC/MIN/G
REST FLOW - MAX: 2.51 CC/MIN/G
REST FLOW - MIN: 0.9 CC/MIN/G
REST FLOW - SEPTAL: 1.72 CC/MIN/G
REST FLOW - WHOLE HEART: 1.7 CC/MIN/G
RETIRED EF AND QEF - SEE NOTES: 53 %
STRESS FLOW - ANTERIOR: 2.34 CC/MIN/G
STRESS FLOW - INFERIOR: 1.72 CC/MIN/G
STRESS FLOW - LATERAL: 2.34 CC/MIN/G
STRESS FLOW - MAX: 2.81 CC/MIN/G
STRESS FLOW - MIN: 1.26 CC/MIN/G
STRESS FLOW - SEPTAL: 2.29 CC/MIN/G
STRESS FLOW - WHOLE HEART: 2.17 CC/MIN/G
SYSTOLIC BLOOD PRESSURE: 197 MMHG

## 2021-11-23 PROCEDURE — 78434 AQMBF PET REST & RX STRESS: CPT | Mod: 26,,, | Performed by: INTERNAL MEDICINE

## 2021-11-23 PROCEDURE — 93018 CV STRESS TEST I&R ONLY: CPT | Mod: ,,, | Performed by: INTERNAL MEDICINE

## 2021-11-23 PROCEDURE — 78434 AQMBF PET REST & RX STRESS: CPT

## 2021-11-23 PROCEDURE — 78434 CARDIAC PET SCAN STRESS (CUPID ONLY): ICD-10-PCS | Mod: 26,,, | Performed by: INTERNAL MEDICINE

## 2021-11-23 PROCEDURE — 93018 CARDIAC PET SCAN STRESS (CUPID ONLY): ICD-10-PCS | Mod: ,,, | Performed by: INTERNAL MEDICINE

## 2021-11-23 PROCEDURE — 63600175 PHARM REV CODE 636 W HCPCS: Performed by: INTERNAL MEDICINE

## 2021-11-23 PROCEDURE — 93016 CV STRESS TEST SUPVJ ONLY: CPT | Mod: ,,, | Performed by: INTERNAL MEDICINE

## 2021-11-23 PROCEDURE — 78431 CARDIAC PET SCAN STRESS (CUPID ONLY): ICD-10-PCS | Mod: 26,,, | Performed by: INTERNAL MEDICINE

## 2021-11-23 PROCEDURE — 78431 MYOCRD IMG PET RST&STRS CT: CPT | Mod: 26,,, | Performed by: INTERNAL MEDICINE

## 2021-11-23 PROCEDURE — 93016 CARDIAC PET SCAN STRESS (CUPID ONLY): ICD-10-PCS | Mod: ,,, | Performed by: INTERNAL MEDICINE

## 2021-11-23 RX ORDER — DIPYRIDAMOLE 5 MG/ML
45.82 INJECTION INTRAVENOUS ONCE
Status: COMPLETED | OUTPATIENT
Start: 2021-11-23 | End: 2021-11-23

## 2021-11-23 RX ADMIN — DIPYRIDAMOLE 45.8 MG: 5 INJECTION INTRAVENOUS at 10:11

## 2021-11-26 ENCOUNTER — LAB VISIT (OUTPATIENT)
Dept: LAB | Facility: HOSPITAL | Age: 81
End: 2021-11-26
Attending: INTERNAL MEDICINE
Payer: MEDICARE

## 2021-11-26 ENCOUNTER — OFFICE VISIT (OUTPATIENT)
Dept: ENDOCRINOLOGY | Facility: CLINIC | Age: 81
End: 2021-11-26
Payer: MEDICARE

## 2021-11-26 VITALS
WEIGHT: 175.94 LBS | OXYGEN SATURATION: 97 % | HEIGHT: 68 IN | HEART RATE: 55 BPM | DIASTOLIC BLOOD PRESSURE: 76 MMHG | BODY MASS INDEX: 26.66 KG/M2 | SYSTOLIC BLOOD PRESSURE: 138 MMHG

## 2021-11-26 DIAGNOSIS — M81.0 AGE-RELATED OSTEOPOROSIS WITHOUT CURRENT PATHOLOGICAL FRACTURE: ICD-10-CM

## 2021-11-26 DIAGNOSIS — E05.90 SUBCLINICAL HYPERTHYROIDISM: Primary | ICD-10-CM

## 2021-11-26 DIAGNOSIS — E05.90 SUBCLINICAL HYPERTHYROIDISM: ICD-10-CM

## 2021-11-26 LAB
CORTIS SERPL-MCNC: 10.5 UG/DL
T3 SERPL-MCNC: 96 NG/DL (ref 60–180)
T4 FREE SERPL-MCNC: 0.87 NG/DL (ref 0.71–1.51)
TSH SERPL DL<=0.005 MIU/L-ACNC: 0.13 UIU/ML (ref 0.4–4)

## 2021-11-26 PROCEDURE — 99999 PR PBB SHADOW E&M-EST. PATIENT-LVL IV: ICD-10-PCS | Mod: PBBFAC,,, | Performed by: INTERNAL MEDICINE

## 2021-11-26 PROCEDURE — 83520 IMMUNOASSAY QUANT NOS NONAB: CPT | Performed by: INTERNAL MEDICINE

## 2021-11-26 PROCEDURE — 99204 OFFICE O/P NEW MOD 45 MIN: CPT | Mod: S$GLB,,, | Performed by: INTERNAL MEDICINE

## 2021-11-26 PROCEDURE — 99204 PR OFFICE/OUTPT VISIT, NEW, LEVL IV, 45-59 MIN: ICD-10-PCS | Mod: S$GLB,,, | Performed by: INTERNAL MEDICINE

## 2021-11-26 PROCEDURE — 36415 COLL VENOUS BLD VENIPUNCTURE: CPT | Performed by: INTERNAL MEDICINE

## 2021-11-26 PROCEDURE — 84443 ASSAY THYROID STIM HORMONE: CPT | Performed by: INTERNAL MEDICINE

## 2021-11-26 PROCEDURE — 84480 ASSAY TRIIODOTHYRONINE (T3): CPT | Performed by: INTERNAL MEDICINE

## 2021-11-26 PROCEDURE — 99999 PR PBB SHADOW E&M-EST. PATIENT-LVL IV: CPT | Mod: PBBFAC,,, | Performed by: INTERNAL MEDICINE

## 2021-11-26 PROCEDURE — 84439 ASSAY OF FREE THYROXINE: CPT | Performed by: INTERNAL MEDICINE

## 2021-11-26 PROCEDURE — 82533 TOTAL CORTISOL: CPT | Performed by: INTERNAL MEDICINE

## 2021-11-29 ENCOUNTER — TELEPHONE (OUTPATIENT)
Dept: ENDOCRINOLOGY | Facility: CLINIC | Age: 81
End: 2021-11-29
Payer: MEDICARE

## 2021-11-29 DIAGNOSIS — E05.90 SUBCLINICAL HYPERTHYROIDISM: Primary | ICD-10-CM

## 2021-11-29 LAB — TSH RECEP AB SER-ACNC: <1.1 IU/L (ref 0–1.75)

## 2021-11-30 ENCOUNTER — TELEPHONE (OUTPATIENT)
Dept: ENDOCRINOLOGY | Facility: CLINIC | Age: 81
End: 2021-11-30
Payer: MEDICARE

## 2021-11-30 ENCOUNTER — DOCUMENTATION ONLY (OUTPATIENT)
Dept: CARDIOLOGY | Facility: HOSPITAL | Age: 81
End: 2021-11-30
Payer: MEDICARE

## 2021-12-14 ENCOUNTER — HOSPITAL ENCOUNTER (OUTPATIENT)
Dept: RADIOLOGY | Facility: HOSPITAL | Age: 81
Discharge: HOME | End: 2021-12-14
Attending: INTERNAL MEDICINE
Payer: MEDICARE

## 2021-12-14 DIAGNOSIS — E05.90 SUBCLINICAL HYPERTHYROIDISM: ICD-10-CM

## 2021-12-14 PROCEDURE — 78014 THYROID IMAGING W/BLOOD FLOW: CPT | Mod: TC

## 2021-12-14 PROCEDURE — 78014 THYROID IMAGING W/BLOOD FLOW: CPT | Mod: 26,,, | Performed by: RADIOLOGY

## 2021-12-14 PROCEDURE — 78014 NM THYROID UPTAKE AND SCAN: ICD-10-PCS | Mod: 26,,, | Performed by: RADIOLOGY

## 2021-12-15 ENCOUNTER — HOSPITAL ENCOUNTER (OUTPATIENT)
Dept: RADIOLOGY | Facility: HOSPITAL | Age: 81
Discharge: HOME | End: 2021-12-15
Attending: INTERNAL MEDICINE
Payer: MEDICARE

## 2021-12-17 ENCOUNTER — TELEPHONE (OUTPATIENT)
Dept: ENDOCRINOLOGY | Facility: CLINIC | Age: 81
End: 2021-12-17
Payer: MEDICARE

## 2021-12-17 DIAGNOSIS — E05.90 SUBCLINICAL HYPERTHYROIDISM: Primary | ICD-10-CM

## 2021-12-17 RX ORDER — METHIMAZOLE 5 MG/1
5 TABLET ORAL DAILY
Qty: 90 TABLET | Refills: 3 | Status: SHIPPED | OUTPATIENT
Start: 2021-12-17 | End: 2022-12-17

## 2022-02-23 ENCOUNTER — HOSPITAL ENCOUNTER (EMERGENCY)
Facility: HOSPITAL | Age: 82
Discharge: HOME | End: 2022-02-23
Attending: EMERGENCY MEDICINE
Payer: MEDICARE

## 2022-02-23 VITALS
SYSTOLIC BLOOD PRESSURE: 174 MMHG | HEART RATE: 80 BPM | TEMPERATURE: 99 F | HEIGHT: 68 IN | DIASTOLIC BLOOD PRESSURE: 80 MMHG | OXYGEN SATURATION: 99 % | RESPIRATION RATE: 20 BRPM | BODY MASS INDEX: 26.75 KG/M2

## 2022-02-23 DIAGNOSIS — U07.1 COVID-19: Primary | ICD-10-CM

## 2022-02-23 DIAGNOSIS — R07.9 CHEST PAIN, UNSPECIFIED TYPE: ICD-10-CM

## 2022-02-23 DIAGNOSIS — R11.10 VOMITING: ICD-10-CM

## 2022-02-23 DIAGNOSIS — R53.83 FATIGUE, UNSPECIFIED TYPE: ICD-10-CM

## 2022-02-23 LAB
ALBUMIN SERPL BCP-MCNC: 4.1 G/DL (ref 3.5–5.2)
ALP SERPL-CCNC: 64 U/L (ref 55–135)
ALT SERPL W/O P-5'-P-CCNC: 59 U/L (ref 10–44)
ANION GAP SERPL CALC-SCNC: 10 MMOL/L (ref 8–16)
ANION GAP SERPL CALC-SCNC: 16 MMOL/L (ref 8–16)
AST SERPL-CCNC: 33 U/L (ref 10–40)
BASOPHILS # BLD AUTO: 0.02 K/UL (ref 0–0.2)
BASOPHILS NFR BLD: 0.4 % (ref 0–1.9)
BILIRUB SERPL-MCNC: 0.7 MG/DL (ref 0.1–1)
BILIRUB UR QL STRIP: NEGATIVE
BNP SERPL-MCNC: 17 PG/ML (ref 0–99)
BUN SERPL-MCNC: 13 MG/DL (ref 8–23)
BUN SERPL-MCNC: 18 MG/DL (ref 6–30)
CALCIUM SERPL-MCNC: 9.1 MG/DL (ref 8.7–10.5)
CHLORIDE SERPL-SCNC: 104 MMOL/L (ref 95–110)
CHLORIDE SERPL-SCNC: 106 MMOL/L (ref 95–110)
CLARITY UR: CLEAR
CO2 SERPL-SCNC: 20 MMOL/L (ref 23–29)
COLOR UR: YELLOW
CREAT SERPL-MCNC: 0.8 MG/DL (ref 0.5–1.4)
CREAT SERPL-MCNC: 0.9 MG/DL (ref 0.5–1.4)
CTP QC/QA: YES
CTP QC/QA: YES
DIFFERENTIAL METHOD: ABNORMAL
EOSINOPHIL # BLD AUTO: 0 K/UL (ref 0–0.5)
EOSINOPHIL NFR BLD: 0 % (ref 0–8)
ERYTHROCYTE [DISTWIDTH] IN BLOOD BY AUTOMATED COUNT: 13.2 % (ref 11.5–14.5)
EST. GFR  (AFRICAN AMERICAN): >60 ML/MIN/1.73 M^2
EST. GFR  (NON AFRICAN AMERICAN): >60 ML/MIN/1.73 M^2
GLUCOSE SERPL-MCNC: 118 MG/DL (ref 70–110)
GLUCOSE SERPL-MCNC: 119 MG/DL (ref 70–110)
GLUCOSE UR QL STRIP: NEGATIVE
HCT VFR BLD AUTO: 40.8 % (ref 40–54)
HCT VFR BLD CALC: 41 %PCV (ref 36–54)
HGB BLD-MCNC: 13.6 G/DL (ref 14–18)
HGB UR QL STRIP: NEGATIVE
IMM GRANULOCYTES # BLD AUTO: 0.02 K/UL (ref 0–0.04)
IMM GRANULOCYTES NFR BLD AUTO: 0.4 % (ref 0–0.5)
KETONES UR QL STRIP: NEGATIVE
LACTATE SERPL-SCNC: 1.1 MMOL/L (ref 0.5–2.2)
LEUKOCYTE ESTERASE UR QL STRIP: NEGATIVE
LIPASE SERPL-CCNC: 22 U/L (ref 4–60)
LYMPHOCYTES # BLD AUTO: 0.6 K/UL (ref 1–4.8)
LYMPHOCYTES NFR BLD: 11 % (ref 18–48)
MCH RBC QN AUTO: 29.8 PG (ref 27–31)
MCHC RBC AUTO-ENTMCNC: 33.3 G/DL (ref 32–36)
MCV RBC AUTO: 90 FL (ref 82–98)
MONOCYTES # BLD AUTO: 0.6 K/UL (ref 0.3–1)
MONOCYTES NFR BLD: 12.4 % (ref 4–15)
NEUTROPHILS # BLD AUTO: 3.8 K/UL (ref 1.8–7.7)
NEUTROPHILS NFR BLD: 75.8 % (ref 38–73)
NITRITE UR QL STRIP: NEGATIVE
NRBC BLD-RTO: 0 /100 WBC
PH UR STRIP: 7 [PH] (ref 5–8)
PLATELET # BLD AUTO: 157 K/UL (ref 150–450)
PMV BLD AUTO: 10.3 FL (ref 9.2–12.9)
POC IONIZED CALCIUM: 1.21 MMOL/L (ref 1.06–1.42)
POC MOLECULAR INFLUENZA A AGN: NEGATIVE
POC MOLECULAR INFLUENZA B AGN: NEGATIVE
POC TCO2 (MEASURED): 24 MMOL/L (ref 23–29)
POTASSIUM BLD-SCNC: 4.1 MMOL/L (ref 3.5–5.1)
POTASSIUM SERPL-SCNC: 3.5 MMOL/L (ref 3.5–5.1)
PROT SERPL-MCNC: 8 G/DL (ref 6–8.4)
PROT UR QL STRIP: NEGATIVE
RBC # BLD AUTO: 4.56 M/UL (ref 4.6–6.2)
SAMPLE: ABNORMAL
SARS-COV-2 RDRP RESP QL NAA+PROBE: POSITIVE
SODIUM BLD-SCNC: 138 MMOL/L (ref 136–145)
SODIUM SERPL-SCNC: 136 MMOL/L (ref 136–145)
SP GR UR STRIP: 1.01 (ref 1–1.03)
TROPONIN I SERPL DL<=0.01 NG/ML-MCNC: 0.01 NG/ML (ref 0–0.03)
URN SPEC COLLECT METH UR: NORMAL
UROBILINOGEN UR STRIP-ACNC: NEGATIVE EU/DL
WBC # BLD AUTO: 5.07 K/UL (ref 3.9–12.7)

## 2022-02-23 PROCEDURE — 96361 HYDRATE IV INFUSION ADD-ON: CPT

## 2022-02-23 PROCEDURE — 84484 ASSAY OF TROPONIN QUANT: CPT | Performed by: EMERGENCY MEDICINE

## 2022-02-23 PROCEDURE — 83880 ASSAY OF NATRIURETIC PEPTIDE: CPT | Performed by: EMERGENCY MEDICINE

## 2022-02-23 PROCEDURE — 82565 ASSAY OF CREATININE: CPT | Mod: 91

## 2022-02-23 PROCEDURE — 84295 ASSAY OF SERUM SODIUM: CPT | Mod: 91

## 2022-02-23 PROCEDURE — 25000003 PHARM REV CODE 250: Performed by: EMERGENCY MEDICINE

## 2022-02-23 PROCEDURE — 99900035 HC TECH TIME PER 15 MIN (STAT)

## 2022-02-23 PROCEDURE — 85014 HEMATOCRIT: CPT | Mod: 91

## 2022-02-23 PROCEDURE — 96374 THER/PROPH/DIAG INJ IV PUSH: CPT

## 2022-02-23 PROCEDURE — 93010 ELECTROCARDIOGRAM REPORT: CPT | Mod: ,,, | Performed by: INTERNAL MEDICINE

## 2022-02-23 PROCEDURE — 83605 ASSAY OF LACTIC ACID: CPT | Performed by: EMERGENCY MEDICINE

## 2022-02-23 PROCEDURE — 84132 ASSAY OF SERUM POTASSIUM: CPT

## 2022-02-23 PROCEDURE — 87502 INFLUENZA DNA AMP PROBE: CPT

## 2022-02-23 PROCEDURE — 63600175 PHARM REV CODE 636 W HCPCS: Performed by: EMERGENCY MEDICINE

## 2022-02-23 PROCEDURE — 80053 COMPREHEN METABOLIC PANEL: CPT | Performed by: EMERGENCY MEDICINE

## 2022-02-23 PROCEDURE — 93005 ELECTROCARDIOGRAM TRACING: CPT

## 2022-02-23 PROCEDURE — 96375 TX/PRO/DX INJ NEW DRUG ADDON: CPT

## 2022-02-23 PROCEDURE — 93010 EKG 12-LEAD: ICD-10-PCS | Mod: ,,, | Performed by: INTERNAL MEDICINE

## 2022-02-23 PROCEDURE — 83690 ASSAY OF LIPASE: CPT | Performed by: EMERGENCY MEDICINE

## 2022-02-23 PROCEDURE — 81003 URINALYSIS AUTO W/O SCOPE: CPT | Performed by: EMERGENCY MEDICINE

## 2022-02-23 PROCEDURE — 82330 ASSAY OF CALCIUM: CPT

## 2022-02-23 PROCEDURE — 85025 COMPLETE CBC W/AUTO DIFF WBC: CPT | Performed by: EMERGENCY MEDICINE

## 2022-02-23 PROCEDURE — U0002 COVID-19 LAB TEST NON-CDC: HCPCS | Performed by: EMERGENCY MEDICINE

## 2022-02-23 PROCEDURE — 99285 EMERGENCY DEPT VISIT HI MDM: CPT | Mod: 25

## 2022-02-23 RX ORDER — FAMOTIDINE 10 MG/ML
20 INJECTION INTRAVENOUS
Status: COMPLETED | OUTPATIENT
Start: 2022-02-23 | End: 2022-02-23

## 2022-02-23 RX ORDER — ONDANSETRON 4 MG/1
4 TABLET, ORALLY DISINTEGRATING ORAL EVERY 6 HOURS PRN
Qty: 12 TABLET | Refills: 0 | Status: SHIPPED | OUTPATIENT
Start: 2022-02-23

## 2022-02-23 RX ORDER — ASPIRIN 325 MG
325 TABLET ORAL
Status: COMPLETED | OUTPATIENT
Start: 2022-02-23 | End: 2022-02-23

## 2022-02-23 RX ORDER — LIDOCAINE HYDROCHLORIDE 20 MG/ML
10 SOLUTION OROPHARYNGEAL ONCE
Status: COMPLETED | OUTPATIENT
Start: 2022-02-23 | End: 2022-02-23

## 2022-02-23 RX ORDER — GUAIFENESIN/DEXTROMETHORPHAN 100-10MG/5
5 SYRUP ORAL 4 TIMES DAILY PRN
Qty: 120 ML | Refills: 0 | Status: SHIPPED | OUTPATIENT
Start: 2022-02-23 | End: 2022-03-05

## 2022-02-23 RX ORDER — ONDANSETRON 2 MG/ML
4 INJECTION INTRAMUSCULAR; INTRAVENOUS
Status: COMPLETED | OUTPATIENT
Start: 2022-02-23 | End: 2022-02-23

## 2022-02-23 RX ORDER — MAG HYDROX/ALUMINUM HYD/SIMETH 200-200-20
30 SUSPENSION, ORAL (FINAL DOSE FORM) ORAL ONCE
Status: COMPLETED | OUTPATIENT
Start: 2022-02-23 | End: 2022-02-23

## 2022-02-23 RX ADMIN — SODIUM CHLORIDE 1000 ML: 0.9 INJECTION, SOLUTION INTRAVENOUS at 03:02

## 2022-02-23 RX ADMIN — MAGNESIUM HYDROXIDE/ALUMINUM HYDROXICE/SIMETHICONE 30 ML: 120; 1200; 1200 SUSPENSION ORAL at 03:02

## 2022-02-23 RX ADMIN — FAMOTIDINE 20 MG: 10 INJECTION INTRAVENOUS at 03:02

## 2022-02-23 RX ADMIN — ONDANSETRON 4 MG: 2 INJECTION INTRAMUSCULAR; INTRAVENOUS at 03:02

## 2022-02-23 RX ADMIN — LIDOCAINE HYDROCHLORIDE 10 ML: 20 SOLUTION ORAL; TOPICAL at 03:02

## 2022-02-23 RX ADMIN — ASPIRIN 325 MG ORAL TABLET 325 MG: 325 PILL ORAL at 03:02

## 2022-02-23 NOTE — ED TRIAGE NOTES
Pt a&ox3, calm and cooperative, c/o of generalized weakness, vomiting and diarrhea, sob. Pt states poor PO intake due nausea and vomiting. Denies fever/chills, chest pain. Respirations even/unlabored, NAD noted sinus on cardiac monitor.

## 2022-07-22 ENCOUNTER — HOSPITAL ENCOUNTER (EMERGENCY)
Facility: HOSPITAL | Age: 82
Discharge: HOME | End: 2022-07-22
Attending: EMERGENCY MEDICINE
Payer: MEDICARE

## 2022-07-22 VITALS
HEART RATE: 61 BPM | DIASTOLIC BLOOD PRESSURE: 81 MMHG | WEIGHT: 187 LBS | RESPIRATION RATE: 18 BRPM | TEMPERATURE: 98 F | BODY MASS INDEX: 28.43 KG/M2 | OXYGEN SATURATION: 98 % | SYSTOLIC BLOOD PRESSURE: 166 MMHG

## 2022-07-22 DIAGNOSIS — R53.83 FATIGUE, UNSPECIFIED TYPE: Primary | ICD-10-CM

## 2022-07-22 LAB
ALBUMIN SERPL BCP-MCNC: 4.2 G/DL (ref 3.5–5.2)
ALP SERPL-CCNC: 36 U/L (ref 55–135)
ALT SERPL W/O P-5'-P-CCNC: 20 U/L (ref 10–44)
ANION GAP SERPL CALC-SCNC: 11 MMOL/L (ref 8–16)
AST SERPL-CCNC: 23 U/L (ref 10–40)
BASOPHILS # BLD AUTO: 0.03 K/UL (ref 0–0.2)
BASOPHILS NFR BLD: 0.8 % (ref 0–1.9)
BILIRUB SERPL-MCNC: 0.7 MG/DL (ref 0.1–1)
BILIRUB UR QL STRIP: NEGATIVE
BNP SERPL-MCNC: 15 PG/ML (ref 0–99)
BUN SERPL-MCNC: 15 MG/DL (ref 8–23)
CALCIUM SERPL-MCNC: 9.5 MG/DL (ref 8.7–10.5)
CHLORIDE SERPL-SCNC: 105 MMOL/L (ref 95–110)
CLARITY UR: CLEAR
CO2 SERPL-SCNC: 23 MMOL/L (ref 23–29)
COLOR UR: YELLOW
CREAT SERPL-MCNC: 1.1 MG/DL (ref 0.5–1.4)
CTP QC/QA: YES
CTP QC/QA: YES
DIFFERENTIAL METHOD: ABNORMAL
EOSINOPHIL # BLD AUTO: 0.1 K/UL (ref 0–0.5)
EOSINOPHIL NFR BLD: 2.8 % (ref 0–8)
ERYTHROCYTE [DISTWIDTH] IN BLOOD BY AUTOMATED COUNT: 12.6 % (ref 11.5–14.5)
EST. GFR  (AFRICAN AMERICAN): >60 ML/MIN/1.73 M^2
EST. GFR  (NON AFRICAN AMERICAN): >60 ML/MIN/1.73 M^2
GLUCOSE SERPL-MCNC: 107 MG/DL (ref 70–110)
GLUCOSE UR QL STRIP: NEGATIVE
HCT VFR BLD AUTO: 36.6 % (ref 40–54)
HGB BLD-MCNC: 12.8 G/DL (ref 14–18)
HGB UR QL STRIP: NEGATIVE
IMM GRANULOCYTES # BLD AUTO: 0.01 K/UL (ref 0–0.04)
IMM GRANULOCYTES NFR BLD AUTO: 0.3 % (ref 0–0.5)
KETONES UR QL STRIP: NEGATIVE
LEUKOCYTE ESTERASE UR QL STRIP: NEGATIVE
LYMPHOCYTES # BLD AUTO: 1.4 K/UL (ref 1–4.8)
LYMPHOCYTES NFR BLD: 34.6 % (ref 18–48)
MCH RBC QN AUTO: 30.8 PG (ref 27–31)
MCHC RBC AUTO-ENTMCNC: 35 G/DL (ref 32–36)
MCV RBC AUTO: 88 FL (ref 82–98)
MONOCYTES # BLD AUTO: 0.4 K/UL (ref 0.3–1)
MONOCYTES NFR BLD: 9.4 % (ref 4–15)
NEUTROPHILS # BLD AUTO: 2.1 K/UL (ref 1.8–7.7)
NEUTROPHILS NFR BLD: 52.1 % (ref 38–73)
NITRITE UR QL STRIP: NEGATIVE
NRBC BLD-RTO: 0 /100 WBC
PH UR STRIP: 7 [PH] (ref 5–8)
PLATELET # BLD AUTO: 172 K/UL (ref 150–450)
PMV BLD AUTO: 9.3 FL (ref 9.2–12.9)
POC MOLECULAR INFLUENZA A AGN: NEGATIVE
POC MOLECULAR INFLUENZA B AGN: NEGATIVE
POTASSIUM SERPL-SCNC: 3.5 MMOL/L (ref 3.5–5.1)
PROT SERPL-MCNC: 7.4 G/DL (ref 6–8.4)
PROT UR QL STRIP: NEGATIVE
RBC # BLD AUTO: 4.15 M/UL (ref 4.6–6.2)
SARS-COV-2 RDRP RESP QL NAA+PROBE: NEGATIVE
SODIUM SERPL-SCNC: 139 MMOL/L (ref 136–145)
SP GR UR STRIP: 1.01 (ref 1–1.03)
T4 FREE SERPL-MCNC: 0.78 NG/DL (ref 0.71–1.51)
TROPONIN I SERPL DL<=0.01 NG/ML-MCNC: 0.01 NG/ML (ref 0–0.03)
TSH SERPL DL<=0.005 MIU/L-ACNC: 0.08 UIU/ML (ref 0.4–4)
URN SPEC COLLECT METH UR: NORMAL
UROBILINOGEN UR STRIP-ACNC: NEGATIVE EU/DL
WBC # BLD AUTO: 3.93 K/UL (ref 3.9–12.7)

## 2022-07-22 PROCEDURE — 25000003 PHARM REV CODE 250: Performed by: PHYSICIAN ASSISTANT

## 2022-07-22 PROCEDURE — 93005 ELECTROCARDIOGRAM TRACING: CPT

## 2022-07-22 PROCEDURE — 81003 URINALYSIS AUTO W/O SCOPE: CPT | Performed by: PHYSICIAN ASSISTANT

## 2022-07-22 PROCEDURE — 87502 INFLUENZA DNA AMP PROBE: CPT

## 2022-07-22 PROCEDURE — 96361 HYDRATE IV INFUSION ADD-ON: CPT

## 2022-07-22 PROCEDURE — 93010 ELECTROCARDIOGRAM REPORT: CPT | Mod: ,,, | Performed by: INTERNAL MEDICINE

## 2022-07-22 PROCEDURE — U0002 COVID-19 LAB TEST NON-CDC: HCPCS | Performed by: PHYSICIAN ASSISTANT

## 2022-07-22 PROCEDURE — 99285 EMERGENCY DEPT VISIT HI MDM: CPT | Mod: 25

## 2022-07-22 PROCEDURE — 83880 ASSAY OF NATRIURETIC PEPTIDE: CPT | Performed by: PHYSICIAN ASSISTANT

## 2022-07-22 PROCEDURE — 93010 EKG 12-LEAD: ICD-10-PCS | Mod: ,,, | Performed by: INTERNAL MEDICINE

## 2022-07-22 PROCEDURE — 84443 ASSAY THYROID STIM HORMONE: CPT | Performed by: PHYSICIAN ASSISTANT

## 2022-07-22 PROCEDURE — 80053 COMPREHEN METABOLIC PANEL: CPT | Performed by: PHYSICIAN ASSISTANT

## 2022-07-22 PROCEDURE — 84484 ASSAY OF TROPONIN QUANT: CPT | Performed by: PHYSICIAN ASSISTANT

## 2022-07-22 PROCEDURE — 84439 ASSAY OF FREE THYROXINE: CPT | Performed by: PHYSICIAN ASSISTANT

## 2022-07-22 PROCEDURE — 96360 HYDRATION IV INFUSION INIT: CPT

## 2022-07-22 PROCEDURE — 85025 COMPLETE CBC W/AUTO DIFF WBC: CPT | Performed by: PHYSICIAN ASSISTANT

## 2022-07-22 RX ORDER — ASPIRIN 325 MG
325 TABLET ORAL
Status: COMPLETED | OUTPATIENT
Start: 2022-07-22 | End: 2022-07-22

## 2022-07-22 RX ADMIN — ASPIRIN 325 MG ORAL TABLET 325 MG: 325 PILL ORAL at 04:07

## 2022-07-22 RX ADMIN — SODIUM CHLORIDE 500 ML: 0.9 INJECTION, SOLUTION INTRAVENOUS at 04:07

## 2022-07-22 NOTE — ED PROVIDER NOTES
"Encounter Date: 7/22/2022       History     Chief Complaint   Patient presents with    Weakness     Pt reporting weakness, short winded x "a couple of weeks".      81 y.o. 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  States that he came in today because he feels much weaker .  Endorses recent cough no fevers chills nausea vomiting diarrhea or dysuria        Review of patient's allergies indicates:  No Known Allergies  Past Medical History:   Diagnosis Date    Dizziness     High cholesterol     Hypertension     Hypertrophy of prostate without urinary obstruction and other lower urinary tract symptoms (LUTS)     Impotence of organic origin     Nocturia     Urinary frequency      Past Surgical History:   Procedure Laterality Date    CATARACT EXTRACTION      CYSTOSCOPY      TRANSURETHRAL RESECTION OF PROSTATE       History reviewed. No pertinent family history.  Social History     Tobacco Use    Smoking status: Never Smoker    Smokeless tobacco: Never Used   Substance Use Topics    Alcohol use: No    Drug use: No     Review of Systems   Constitutional: Negative.    HENT: Negative.    Eyes: Negative.    Respiratory: Negative.    Cardiovascular: Negative.    Gastrointestinal: Negative.    Endocrine: Negative.    Genitourinary: Negative.    Musculoskeletal: Negative.    Skin: Negative.    Allergic/Immunologic: Negative.    Hematological: Negative.    Psychiatric/Behavioral: Negative.    All other systems reviewed and are negative.      Physical Exam     Initial Vitals [07/22/22 1515]   BP Pulse Resp Temp SpO2   (!) 146/78 69 18 98.3 °F (36.8 °C) 96 %      MAP       --     "     Physical Exam    Nursing note and vitals reviewed.  Constitutional: He appears well-developed and well-nourished.   HENT:   Head: Normocephalic and atraumatic.   Eyes: EOM are normal. Pupils are equal, round, and reactive to light.   Cardiovascular: Normal rate and regular rhythm.   Pulmonary/Chest: Effort normal.   Abdominal: He exhibits no distension.   Musculoskeletal:         General: No tenderness or edema.     Neurological: He is alert and oriented to person, place, and time. No cranial nerve deficit.   Skin: Skin is warm and dry.   Psychiatric: He has a normal mood and affect.       No LE edema, no jvd  ED Course   Procedures  Labs Reviewed   CBC W/ AUTO DIFFERENTIAL - Abnormal; Notable for the following components:       Result Value    RBC 4.15 (*)     Hemoglobin 12.8 (*)     Hematocrit 36.6 (*)     All other components within normal limits   COMPREHENSIVE METABOLIC PANEL - Abnormal; Notable for the following components:    Alkaline Phosphatase 36 (*)     All other components within normal limits   TSH - Abnormal; Notable for the following components:    TSH 0.076 (*)     All other components within normal limits   TROPONIN I   B-TYPE NATRIURETIC PEPTIDE   URINALYSIS, REFLEX TO URINE CULTURE    Narrative:     Specimen Source->Urine   TSH   T4, FREE   SARS-COV-2 RDRP GENE   POCT INFLUENZA A/B MOLECULAR     EKG Readings: (Independently Interpreted)   Hr 65, sinus, nl axis/intervals, no jaqueline/twi, non acute, no stemi.        Imaging Results          X-Ray Chest AP Portable (Final result)  Result time 07/22/22 16:49:57    Final result by Kaushik Dawson MD (07/22/22 16:49:57)                 Impression:      No acute process.      Electronically signed by: Kaushik Dwason MD  Date:    07/22/2022  Time:    16:49             Narrative:    EXAMINATION:  XR CHEST AP PORTABLE    CLINICAL HISTORY:  Chest Pain; Other fatigue    TECHNIQUE:  Single frontal view of the chest was  performed.    COMPARISON:  02/23/2022.    FINDINGS:  Monitoring EKG leads are present.  The trachea is unremarkable.  The cardiomediastinal silhouette is within normal limits.  The hemidiaphragms are unremarkable.  There are no pleural effusions.  There is no evidence of a pneumothorax.  There is no evidence of pneumomediastinum.  No airspace opacity is present.  There are degenerative changes in the osseous structures.                                 Medications   aspirin tablet 325 mg (325 mg Oral Given 7/22/22 1612)   sodium chloride 0.9% bolus 500 mL (0 mLs Intravenous Stopped 7/22/22 1804)                        Will do screening labs, cxr, ekg    Labs Reviewed   CBC W/ AUTO DIFFERENTIAL - Abnormal; Notable for the following components:       Result Value    RBC 4.15 (*)     Hemoglobin 12.8 (*)     Hematocrit 36.6 (*)     All other components within normal limits   COMPREHENSIVE METABOLIC PANEL - Abnormal; Notable for the following components:    Alkaline Phosphatase 36 (*)     All other components within normal limits   TSH - Abnormal; Notable for the following components:    TSH 0.076 (*)     All other components within normal limits   TROPONIN I   B-TYPE NATRIURETIC PEPTIDE   URINALYSIS, REFLEX TO URINE CULTURE    Narrative:     Specimen Source->Urine   TSH   T4, FREE   SARS-COV-2 RDRP GENE   POCT INFLUENZA A/B MOLECULAR       X-Ray Chest AP Portable   Final Result      No acute process.         Electronically signed by: Kaushik Dawson MD   Date:    07/22/2022   Time:    16:49            Clinical Impression:   Final diagnoses:  [R53.83] Fatigue, unspecified type (Primary)          ED Disposition Condition    Discharge Stable        ED Prescriptions     None        Follow-up Information    None          Ana Gomez MD  07/22/22 2780

## 2022-07-22 NOTE — FIRST PROVIDER EVALUATION
Medical screening exam completed.  I have conducted a focused provider triage encounter, findings are as follows:    Brief history of present illness:  Generalized weakness and fatigue.  Reports intermittent heart fluttering and shortness of breath with exertion.  No use of blood thinners, melena, hematochezia, hematuria, vomiting, diarrhea. Endorses decreased PO intake. No heat exposure. Concerned about dehydration.     Vitals:    07/22/22 1515   BP: (!) 146/78   Pulse: 69   Resp: 18   Temp: 98.3 °F (36.8 °C)   TempSrc: Oral   SpO2: 96%   Weight: 84.8 kg (187 lb)       Pertinent physical exam:  Not tachy or hypoxic. Ambulatory without difficulty. Answering questions approrpiately     Brief workup plan:  Labs     Preliminary workup initiated; this workup will be continued and followed by the physician or advanced practice provider that is assigned to the patient when roomed.

## 2022-07-22 NOTE — DISCHARGE INSTRUCTIONS
Thank you for coming to our Emergency Department today. It is important to remember that some problems or medical conditions are difficult to diagnose and may not be found during your Emergency Department visit.     Be sure to follow up with your primary care doctor and review all labs/imaging/tests that were performed during your ER visit with them. Some labs/tests may be outside of the normal range and require non-emergent follow-up and further investigation to help diagnose/exclude/prevent complications or other potentially serious medical conditions that were not addressed during your ER visit.    If you do not have a primary care doctor, you may contact the one listed on your discharge paperwork or you may also call the [facility] Appointment Desk at [phone] to schedule an appointment and establish care with one. It is important to your health that you have a primary care doctor.    Please take all medications as directed. All medications may potentially have side-effects and it is impossible to predict which medications may give you side-effects or what side-effects (if any) they will give you.. If you feel that you are having a negative effect or side-effect of any medication you should immediately stop taking them and seek medical attention. If you feel that you are having a life-threatening reaction call 911.    Return to the ER with any questions/concerns, new/concerning symptoms, worsening or failure to improve.     Do not drive, swim, climb to height, take a bath, operate heavy machinery, drink alcohol or take potentially sedating medications, sign any legal documents or make any important decisions for 24 hours if you have received any pain medications, sedatives or mood altering drugs during your ER visit or within 24 hours of taking them if they have been prescribed to you.     You can find additional resources for Dentists, hearing aids, durable medical equipment, low cost pharmacies and  other resources at [email or web address].org    BELOW THIS LINE ONLY APPLIES IF YOU HAVE A COVID TEST PENDING OR IF YOU HAVE BEEN DIAGNOSED WITH COVID:  Please access [facility] to review the results of your test. Until the results of your COVID test return, you should isolate yourself so as not to potentially spread illness to others.   If your COVID test returns positive, you should isolate yourself so as not to spread illness to others. After five full days, if you are feeling better and you have not had fever for 24 hours, you can return to your typical daily activities, but you must wear a mask around others for an additional 5 days.   If your COVID test returns negative and you are either unvaccinated or more than six months out from your two-dose vaccine and are not yet boosted, you should still quarantine for 5 full days followed by strict mask use for an additional 5 full days.   If your COVID test returns negative and you have received your 2-dose initial vaccine as well as a booster, you should continue strict mask use for 10 full days after the exposure.  For all those exposed, best practice includes a test at day 5 after the exposure. This can be a home test or a test through one of the many testing centers throughout our community.   Masking is always advised to limit the spread of COVID. Cdc.gov is an excellent site to obtain the latest up to date recommendations regarding COVID and COVID testing.     CDC Testing and Quarantine Guidelines for patients with exposure to a known-positive COVID-19 person:  A close exposure is defined as anyone who has had an exposure (masked or unmasked) to a known COVID -19 positive person within 6 feet of someone for a cumulative total of 15 minutes or more over a 24-hour period.   Vaccinated and/or if you recently had a positive covid test within 90 days do NOT need to quarantine after contact with someone who had COVID-19 unless you develop symptoms.   Fully vaccinated  people who have not had a positive test within 90 days, should get tested 3-5 days after their exposure, even if they don't have symptoms and wear a mask indoors in public for 14 days following exposure or until their test result is negative.      Unvaccinated and/or NOT had a positive test within 90 days and meet close exposure  You are required by CDC guidelines to quarantine for at least 5 days from time of exposure followed by 5 days of strict masking. It is recommended, but not required to test after 5 days, unless you develop symptoms, in which case you should test at that time.  If you get tested after 5 days and your test is positive, your 5 day period of isolation starts the day of the positive test.    If your exposure does not meet the above definition, you can return to your normal daily activities to include social distancing, wearing a mask and frequent handwashing.      Here is a link to guidance from the CDC:  https://www.cdc.gov/media/releases/2021/s1227-isolation-quarantine-guidance.html      [place] Dept Of Health Testing Sites:  https://ldh.la.gov/page/3934      [facility] website with testing locations and guidance:  [email or web address]

## 2022-08-09 ENCOUNTER — HOSPITAL ENCOUNTER (EMERGENCY)
Facility: HOSPITAL | Age: 82
Discharge: HOME | End: 2022-08-09
Attending: EMERGENCY MEDICINE
Payer: MEDICARE

## 2022-08-09 VITALS
OXYGEN SATURATION: 96 % | SYSTOLIC BLOOD PRESSURE: 144 MMHG | TEMPERATURE: 100 F | DIASTOLIC BLOOD PRESSURE: 76 MMHG | WEIGHT: 185 LBS | BODY MASS INDEX: 28.04 KG/M2 | HEIGHT: 68 IN | HEART RATE: 98 BPM | RESPIRATION RATE: 18 BRPM

## 2022-08-09 DIAGNOSIS — R68.89 MULTIPLE COMPLAINTS: ICD-10-CM

## 2022-08-09 DIAGNOSIS — U07.1 COVID-19 VIRUS INFECTION: Primary | ICD-10-CM

## 2022-08-09 DIAGNOSIS — R53.1 GENERALIZED WEAKNESS: ICD-10-CM

## 2022-08-09 DIAGNOSIS — R05.9 COUGH: ICD-10-CM

## 2022-08-09 LAB
ALBUMIN SERPL BCP-MCNC: 4.6 G/DL (ref 3.5–5.2)
ALP SERPL-CCNC: 47 U/L (ref 55–135)
ALT SERPL W/O P-5'-P-CCNC: 23 U/L (ref 10–44)
ANION GAP SERPL CALC-SCNC: 10 MMOL/L (ref 8–16)
AST SERPL-CCNC: 34 U/L (ref 10–40)
BASOPHILS # BLD AUTO: 0.01 K/UL (ref 0–0.2)
BASOPHILS NFR BLD: 0.2 % (ref 0–1.9)
BILIRUB SERPL-MCNC: 0.9 MG/DL (ref 0.1–1)
BILIRUB UR QL STRIP: NEGATIVE
BNP SERPL-MCNC: 20 PG/ML (ref 0–99)
BUN SERPL-MCNC: 9 MG/DL (ref 8–23)
CALCIUM SERPL-MCNC: 10 MG/DL (ref 8.7–10.5)
CHLORIDE SERPL-SCNC: 98 MMOL/L (ref 95–110)
CK SERPL-CCNC: 913 U/L (ref 20–200)
CLARITY UR REFRACT.AUTO: CLEAR
CO2 SERPL-SCNC: 27 MMOL/L (ref 23–29)
COLOR UR AUTO: YELLOW
CREAT SERPL-MCNC: 0.9 MG/DL (ref 0.5–1.4)
CRP SERPL-MCNC: 13.6 MG/L (ref 0–8.2)
DIFFERENTIAL METHOD: ABNORMAL
EOSINOPHIL # BLD AUTO: 0 K/UL (ref 0–0.5)
EOSINOPHIL NFR BLD: 0.8 % (ref 0–8)
ERYTHROCYTE [DISTWIDTH] IN BLOOD BY AUTOMATED COUNT: 12.3 % (ref 11.5–14.5)
EST. GFR  (NO RACE VARIABLE): >60 ML/MIN/1.73 M^2
FERRITIN SERPL-MCNC: 358 NG/ML (ref 20–300)
GLUCOSE SERPL-MCNC: 95 MG/DL (ref 70–110)
GLUCOSE UR QL STRIP: NEGATIVE
HCT VFR BLD AUTO: 36.3 % (ref 40–54)
HGB BLD-MCNC: 12.5 G/DL (ref 14–18)
HGB UR QL STRIP: NEGATIVE
IMM GRANULOCYTES # BLD AUTO: 0.02 K/UL (ref 0–0.04)
IMM GRANULOCYTES NFR BLD AUTO: 0.4 % (ref 0–0.5)
KETONES UR QL STRIP: ABNORMAL
LACTATE SERPL-SCNC: 1.1 MMOL/L (ref 0.5–2.2)
LDH SERPL L TO P-CCNC: 311 U/L (ref 110–260)
LEUKOCYTE ESTERASE UR QL STRIP: NEGATIVE
LYMPHOCYTES # BLD AUTO: 0.5 K/UL (ref 1–4.8)
LYMPHOCYTES NFR BLD: 11 % (ref 18–48)
MCH RBC QN AUTO: 30.9 PG (ref 27–31)
MCHC RBC AUTO-ENTMCNC: 34.4 G/DL (ref 32–36)
MCV RBC AUTO: 90 FL (ref 82–98)
MONOCYTES # BLD AUTO: 0.9 K/UL (ref 0.3–1)
MONOCYTES NFR BLD: 18.5 % (ref 4–15)
NEUTROPHILS # BLD AUTO: 3.3 K/UL (ref 1.8–7.7)
NEUTROPHILS NFR BLD: 69.1 % (ref 38–73)
NITRITE UR QL STRIP: NEGATIVE
NRBC BLD-RTO: 0 /100 WBC
PH UR STRIP: 8 [PH] (ref 5–8)
PLATELET # BLD AUTO: 179 K/UL (ref 150–450)
PMV BLD AUTO: 9.5 FL (ref 9.2–12.9)
POTASSIUM SERPL-SCNC: 3.3 MMOL/L (ref 3.5–5.1)
PROT SERPL-MCNC: 8.1 G/DL (ref 6–8.4)
PROT UR QL STRIP: NEGATIVE
RBC # BLD AUTO: 4.05 M/UL (ref 4.6–6.2)
SARS-COV-2 RDRP RESP QL NAA+PROBE: POSITIVE
SODIUM SERPL-SCNC: 135 MMOL/L (ref 136–145)
SP GR UR STRIP: 1.01 (ref 1–1.03)
TROPONIN I SERPL DL<=0.01 NG/ML-MCNC: 0.01 NG/ML (ref 0–0.03)
URN SPEC COLLECT METH UR: ABNORMAL
WBC # BLD AUTO: 4.8 K/UL (ref 3.9–12.7)

## 2022-08-09 PROCEDURE — 83615 LACTATE (LD) (LDH) ENZYME: CPT | Performed by: EMERGENCY MEDICINE

## 2022-08-09 PROCEDURE — 99285 PR EMERGENCY DEPT VISIT,LEVEL V: ICD-10-PCS | Mod: CS,,, | Performed by: PHYSICIAN ASSISTANT

## 2022-08-09 PROCEDURE — 93010 EKG 12-LEAD: ICD-10-PCS | Mod: ,,, | Performed by: INTERNAL MEDICINE

## 2022-08-09 PROCEDURE — 86140 C-REACTIVE PROTEIN: CPT | Performed by: EMERGENCY MEDICINE

## 2022-08-09 PROCEDURE — 94761 N-INVAS EAR/PLS OXIMETRY MLT: CPT

## 2022-08-09 PROCEDURE — 96360 HYDRATION IV INFUSION INIT: CPT

## 2022-08-09 PROCEDURE — 82550 ASSAY OF CK (CPK): CPT | Performed by: EMERGENCY MEDICINE

## 2022-08-09 PROCEDURE — 25000242 PHARM REV CODE 250 ALT 637 W/ HCPCS: Performed by: PHYSICIAN ASSISTANT

## 2022-08-09 PROCEDURE — 93005 ELECTROCARDIOGRAM TRACING: CPT

## 2022-08-09 PROCEDURE — 82728 ASSAY OF FERRITIN: CPT | Performed by: EMERGENCY MEDICINE

## 2022-08-09 PROCEDURE — 80053 COMPREHEN METABOLIC PANEL: CPT | Performed by: EMERGENCY MEDICINE

## 2022-08-09 PROCEDURE — 87040 BLOOD CULTURE FOR BACTERIA: CPT | Performed by: EMERGENCY MEDICINE

## 2022-08-09 PROCEDURE — U0002 COVID-19 LAB TEST NON-CDC: HCPCS | Performed by: EMERGENCY MEDICINE

## 2022-08-09 PROCEDURE — 99285 EMERGENCY DEPT VISIT HI MDM: CPT | Mod: CS,,, | Performed by: PHYSICIAN ASSISTANT

## 2022-08-09 PROCEDURE — 83605 ASSAY OF LACTIC ACID: CPT | Performed by: EMERGENCY MEDICINE

## 2022-08-09 PROCEDURE — 25000003 PHARM REV CODE 250: Performed by: PHYSICIAN ASSISTANT

## 2022-08-09 PROCEDURE — 85025 COMPLETE CBC W/AUTO DIFF WBC: CPT | Performed by: EMERGENCY MEDICINE

## 2022-08-09 PROCEDURE — 83880 ASSAY OF NATRIURETIC PEPTIDE: CPT | Performed by: EMERGENCY MEDICINE

## 2022-08-09 PROCEDURE — 99285 EMERGENCY DEPT VISIT HI MDM: CPT | Mod: 25

## 2022-08-09 PROCEDURE — 63600175 PHARM REV CODE 636 W HCPCS: Performed by: PHYSICIAN ASSISTANT

## 2022-08-09 PROCEDURE — 84484 ASSAY OF TROPONIN QUANT: CPT | Performed by: EMERGENCY MEDICINE

## 2022-08-09 PROCEDURE — 94640 AIRWAY INHALATION TREATMENT: CPT

## 2022-08-09 PROCEDURE — 93010 ELECTROCARDIOGRAM REPORT: CPT | Mod: ,,, | Performed by: INTERNAL MEDICINE

## 2022-08-09 PROCEDURE — 81003 URINALYSIS AUTO W/O SCOPE: CPT | Performed by: PHYSICIAN ASSISTANT

## 2022-08-09 RX ORDER — ACETAMINOPHEN 500 MG
1000 TABLET ORAL
Status: COMPLETED | OUTPATIENT
Start: 2022-08-09 | End: 2022-08-09

## 2022-08-09 RX ORDER — BENZONATATE 100 MG/1
100 CAPSULE ORAL 3 TIMES DAILY PRN
Qty: 20 CAPSULE | Refills: 0 | Status: SHIPPED | OUTPATIENT
Start: 2022-08-09 | End: 2022-08-19

## 2022-08-09 RX ORDER — GUAIFENESIN 100 MG/5ML
100-200 SOLUTION ORAL EVERY 4 HOURS PRN
Qty: 60 ML | Refills: 0 | Status: SHIPPED | OUTPATIENT
Start: 2022-08-09 | End: 2022-08-19

## 2022-08-09 RX ORDER — ALBUTEROL SULFATE 90 UG/1
2 AEROSOL, METERED RESPIRATORY (INHALATION)
Status: COMPLETED | OUTPATIENT
Start: 2022-08-09 | End: 2022-08-09

## 2022-08-09 RX ORDER — BENZONATATE 100 MG/1
100 CAPSULE ORAL
Status: COMPLETED | OUTPATIENT
Start: 2022-08-09 | End: 2022-08-09

## 2022-08-09 RX ADMIN — ACETAMINOPHEN 1000 MG: 500 TABLET ORAL at 06:08

## 2022-08-09 RX ADMIN — ALBUTEROL SULFATE 2 PUFF: 108 INHALANT RESPIRATORY (INHALATION) at 06:08

## 2022-08-09 RX ADMIN — SODIUM CHLORIDE, SODIUM LACTATE, POTASSIUM CHLORIDE, AND CALCIUM CHLORIDE 500 ML: .6; .31; .03; .02 INJECTION, SOLUTION INTRAVENOUS at 09:08

## 2022-08-09 RX ADMIN — BENZONATATE 100 MG: 100 CAPSULE ORAL at 08:08

## 2022-08-09 NOTE — PROVIDER PROGRESS NOTES - EMERGENCY DEPT.
Encounter Date: 8/9/2022    ED Physician Progress Notes         EKG - STEMI Decision  Initial Reading: No STEMI present.  Response: No Action Needed.

## 2022-08-09 NOTE — ED PROVIDER NOTES
Encounter Date: 8/9/2022       History     Chief Complaint   Patient presents with    Multiple complaints     Bad reflux last night, now hoarse, and coughing     The history is provided by the patient and medical records. No  was used.      Chano Guillen is a 81 y.o. male with medical history of HTN, HLD presenting to the ED with multiple complaints.     Patient reports sore throat, sinus congestion, post nasal drip, persistent clear productive cough, decreased appetite for the past week. Feels like he is losing his voice from coughing so much. History of COVID vaccine and booster x2. Patient is supposed to use a walker at his baseline, but wife reports he is stubborn and does not use it normally. Patient with increased difficulty with mobility today and wife could barely get him in the car. Patient required two ED staff to assist him into ED stretcher which is unusual for him. Denies fever, chills, chest pain, SOB, abdominal pain, vomiting, urinary or bowel movement changes. Last BM 2 days ago. He has been taking Robitussin without improvement.    Review of patient's allergies indicates:  No Known Allergies  Past Medical History:   Diagnosis Date    Dizziness     High cholesterol     Hypertension     Hypertrophy of prostate without urinary obstruction and other lower urinary tract symptoms (LUTS)     Impotence of organic origin     Nocturia     Urinary frequency      Past Surgical History:   Procedure Laterality Date    CATARACT EXTRACTION      CYSTOSCOPY      TRANSURETHRAL RESECTION OF PROSTATE       No family history on file.  Social History     Tobacco Use    Smoking status: Never Smoker    Smokeless tobacco: Never Used   Substance Use Topics    Alcohol use: No    Drug use: No     Review of Systems   Constitutional: Positive for appetite change. Negative for fever.   HENT: Positive for congestion, postnasal drip and sore throat.    Eyes: Negative for pain.   Respiratory:  Positive for cough. Negative for shortness of breath.    Cardiovascular: Negative for chest pain.   Gastrointestinal: Negative for nausea.   Genitourinary: Negative for dysuria.   Musculoskeletal: Negative for back pain.   Skin: Negative for rash.   Neurological: Positive for weakness.   Hematological: Does not bruise/bleed easily.       Physical Exam     Initial Vitals [08/09/22 1500]   BP Pulse Resp Temp SpO2   (!) 176/81 94 20 (!) 100.9 °F (38.3 °C) 98 %      MAP       --         Physical Exam    Constitutional: He appears well-developed and well-nourished. He is not diaphoretic. He is easily aroused. He appears ill.   HENT:   Head: Normocephalic and atraumatic.   Nose: Rhinorrhea present.   Mouth/Throat: Oropharynx is clear and moist. No oropharyngeal exudate.   Posterior oropharynx clear and pink. Tolerating secretions.   Eyes: EOM and lids are normal. Pupils are equal, round, and reactive to light. Right conjunctiva is injected. Left conjunctiva is injected. No scleral icterus.   Neck: Phonation normal. Neck supple. No stridor present.   Normal range of motion.  Cardiovascular: Normal rate and regular rhythm.   Pulmonary/Chest: Breath sounds normal. No stridor. No respiratory distress. He has no wheezes. He has no rales.   Coarse breath sounds   Abdominal: Abdomen is soft. He exhibits no distension. There is no abdominal tenderness. There is no rebound.   Musculoskeletal:         General: No tenderness or edema. Normal range of motion.      Cervical back: Normal range of motion and neck supple.     Neurological: He is alert, oriented to person, place, and time and easily aroused. He has normal strength. No sensory deficit.   Skin: Skin is warm and dry. No rash noted. No erythema.   Psychiatric: He has a normal mood and affect. His speech is normal.       ED Course   Procedures  Labs Reviewed   CBC W/ AUTO DIFFERENTIAL - Abnormal; Notable for the following components:       Result Value    RBC 4.05 (*)      Hemoglobin 12.5 (*)     Hematocrit 36.3 (*)     Lymph # 0.5 (*)     Lymph % 11.0 (*)     Mono % 18.5 (*)     All other components within normal limits   COMPREHENSIVE METABOLIC PANEL - Abnormal; Notable for the following components:    Sodium 135 (*)     Potassium 3.3 (*)     Alkaline Phosphatase 47 (*)     All other components within normal limits   SARS-COV-2 RNA AMPLIFICATION, QUAL - Abnormal; Notable for the following components:    SARS-CoV-2 RNA, Amplification, Qual Positive (*)     All other components within normal limits   URINALYSIS, REFLEX TO URINE CULTURE - Abnormal; Notable for the following components:    Ketones, UA Trace (*)     All other components within normal limits    Narrative:     Specimen Source->Urine   LACTATE DEHYDROGENASE - Abnormal; Notable for the following components:     (*)     All other components within normal limits   CK - Abnormal; Notable for the following components:     (*)     All other components within normal limits    Narrative:     add on cpk,ferrtitn,trop, and crp per dr sami gresham/order#[id number] @19:53 08/09/2022  add-on BNP per Sami Gresham PA-C  08/09/2022  20:32 [id number]   FERRITIN - Abnormal; Notable for the following components:    Ferritin 358 (*)     All other components within normal limits    Narrative:     add on cpk,ferrtitn,trop, and crp per dr sami gresham/order#[id number] @19:53 08/09/2022  add-on BNP per Sami Gresham PA-C  08/09/2022  20:32 [id number]   C-REACTIVE PROTEIN - Abnormal; Notable for the following components:    CRP 13.6 (*)     All other components within normal limits    Narrative:     add on cpk,ferrtitn,trop, and crp per dr sami gresham/order#[id number] @19:53 08/09/2022  add-on BNP per Sami Gresham PA-C  08/09/2022  20:32 [id number]   CULTURE, BLOOD   CULTURE, BLOOD   LACTIC ACID, PLASMA   C-REACTIVE PROTEIN   FERRITIN   CK    TROPONIN I   B-TYPE NATRIURETIC PEPTIDE   TROPONIN I    Narrative:     add on cpk,ferrtitn,trop, and crp per dr sami gresham/order#[id number] @19:53 08/09/2022  add-on BNP per Sami Gresham PA-C  08/09/2022  20:32 [id number]   B-TYPE NATRIURETIC PEPTIDE    Narrative:     add on cpk,ferrtitn,trop, and crp per dr sami gresham/order#[id number] @19:53 08/09/2022  add-on BNP per Sami Gresham PA-C  08/09/2022  20:32 [id number]        ECG Results          EKG 12-lead (Final result)  Result time 08/09/22 17:39:00    Final result by Interface, Lab [facility] (08/09/22 17:39:00)                 Narrative:    Test Reason : R68.89,    Vent. Rate : 084 BPM     Atrial Rate : 084 BPM     P-R Int : 168 ms          QRS Dur : 092 ms      QT Int : 376 ms       P-R-T Axes : 067 004 049 degrees     QTc Int : 444 ms    Normal sinus rhythm  Nonspecific T wave abnormality  Abnormal ECG  When compared with ECG of 22-JUL-2022 15:59,  Nonspecific T wave abnormality, worse in Anterior leads  Confirmed by DRAKE BOWEN MD ([id number]) on 8/9/2022 5:38:49 PM    Referred By:             Confirmed By:DRAKE BOWEN MD                            Imaging Results          X-Ray Chest PA And Lateral (Final result)  Result time 08/09/22 20:24:20    Final result by Uziel Elmore MD (08/09/22 20:24:20)                 Impression:      No acute abnormality.      Electronically signed by: Uziel Elmore  Date:    08/09/2022  Time:    20:24             Narrative:    EXAMINATION:  XR CHEST PA AND LATERAL    CLINICAL HISTORY:  Cough;    TECHNIQUE:  PA and lateral views of the chest were performed.    COMPARISON:  07/20/2022    FINDINGS:  The lungs are clear, with normal appearance of pulmonary vasculature and no pleural effusion or pneumothorax.    The cardiac silhouette is normal in size. The hilar and mediastinal contours are unremarkable.    Bones are intact.                                  Medications   acetaminophen tablet 1,000 mg (1,000 mg Oral Given 8/9/22 1843)   albuterol inhaler 2 puff (2 puffs Inhalation Given 8/9/22 1809)   benzonatate capsule 100 mg (100 mg Oral Given 8/9/22 2017)   lactated ringers bolus 500 mL (0 mLs Intravenous Stopped 8/9/22 2159)     Medical Decision Making:   History:   Old Medical Records: I decided to obtain old medical records.  Old Records Summarized: records from clinic visits.  Independently Interpreted Test(s):   I have ordered and independently interpreted EKG Reading(s) - see summary below       <> Summary of EKG Reading(s): NSR 84 bpm. No STEMI  Clinical Tests:   Lab Tests: Ordered and Reviewed  Radiological Study: Ordered and Reviewed  Medical Tests: Ordered and Reviewed       APC / Resident Notes:   81 y.o. male with medical history of HTN, HLD presenting to the ED c/o sore throat, sinus congestion, post nasal drip, persistent clear productive cough, decreased appetite for the past week. DDx includes but not limited to viral syndrome, COVID, pneumonia, aspiration pneumonia, bacteremia, sinusitis, pharyngitis.                Work-up reviewed. COVID-19 positive and likely etiology for his symptoms. CXR without large consolidation, pleural effusion, or pneumothorax on my read. Cardiac markers negative. Lactate negative. CPK slightly elevated 900 with normal renal function and lower suspicion for rhabdomyolysis. He does require assistance with ambulation, noting he is supposed to use a walker for assistance at home. Lowest POx measured to be 95%. I discussed hospitalization because of my concerns for his weakness. Patient and wife strongly prefer to return home at this time. Wife comfortable managing him at home. Will discharge patient with RX for Paxlovid and Tessalon/Robitussin for his cough. Advised PCP follow-up within the next week. Pulse oximetry provided. Patient expresses understanding and agreeable to the plan. Return to ED precautions given for new,  worsening, or concerning symptoms. I have discussed the care of this patient with my supervising physician.         Clinical Impression:   Final diagnoses:  [R68.89] Multiple complaints  [U07.1] COVID-19 virus infection (Primary)  [R53.1] Generalized weakness  [R05.9] Cough          ED Disposition Condition    Discharge Stable        ED Prescriptions     Medication Sig Dispense Start Date End Date Auth. Provider    nirmatrelvir-ritonavir 300 mg (150 mg x 2)-100 mg copackaged tablets (EUA) Take 3 tablets by mouth 2 (two) times daily for 5 days. Each dose contains 2 nirmatrelvir (pink tablets) and 1 ritonavir (white tablet). Take all 3 tablets together 30 tablet 8/9/2022 8/14/2022 Sami Ha PA-C    benzonatate (TESSALON) 100 MG capsule Take 1 capsule (100 mg total) by mouth 3 (three) times daily as needed for Cough. 20 capsule 8/9/2022 8/19/2022 Sami Ha PA-C    guaiFENesin 100 mg/5 ml (ROBITUSSIN) 100 mg/5 mL syrup Take 5-10 mLs (100-200 mg total) by mouth every 4 (four) hours as needed for Cough. 60 mL 8/9/2022 8/19/2022 Sami Ha PA-C        Follow-up Information     Follow up With Specialties Details Why Contact Info    Nancy Ahuja MD Internal Medicine   [facility] [address] HOSP/CLINIC  [facility] [address]  [phone]             Sami Ha PA-C  08/10/22 6035

## 2022-08-09 NOTE — FIRST PROVIDER EVALUATION
Medical screening exam completed.  I have conducted a focused provider triage encounter, findings are as follows:    Brief history of present illness:  82 yo M presents w/ cough and hoarse voice. Began w/ reflux symptoms.     There were no vitals filed for this visit.    Pertinent physical exam:  Coarse BS bilaterally    Brief workup plan:  Concern for aspiration PNA. Will obtain CXR, EKG, labs, covid test.    Preliminary workup initiated; this workup will be continued and followed by the physician or advanced practice provider that is assigned to the patient when roomed.

## 2022-08-10 ENCOUNTER — NURSE TRIAGE (OUTPATIENT)
Dept: ADMINISTRATIVE | Facility: CLINIC | Age: 82
End: 2022-08-10
Payer: MEDICARE

## 2022-08-10 NOTE — DISCHARGE INSTRUCTIONS
Take the prescribed Paxlovid as directed. Follow-up with your primary care provider for further evaluation. Do not take your cholesterol medication (statin) while on Paxlovid.     Continue hydrating by drinking water at home  Take the prescribed Tessalon and Robitussin for your cough    Return to the emergency room for new, worsening, or concerning symptoms.

## 2022-08-10 NOTE — TELEPHONE ENCOUNTER
Pt called and he was resting but poke to the wife and she said that he doesn't have my chart nor does he text so pt will be removed from the program and they were given the number to [facility]OC to call if any questions or concerns and will remove the task  Reason for Disposition   Message left with person in household    Protocols used: NO CONTACT OR DUPLICATE CONTACT CALL-A-[place]

## 2022-08-10 NOTE — ED NOTES
Patient identifiers for Chano Guillen 81 y.o. male checked and correct.  Chief Complaint   Patient presents with    Multiple complaints     Bad reflux last night, now hoarse, and coughing     Past Medical History:   Diagnosis Date    Dizziness     High cholesterol     Hypertension     Hypertrophy of prostate without urinary obstruction and other lower urinary tract symptoms (LUTS)     Impotence of organic origin     Nocturia     Urinary frequency      Allergies reported: Review of patient's allergies indicates:  No Known Allergies      LOC: Patient is awake, alert, and aware of environment with an appropriate affect. Patient is oriented x 4 and speaking appropriately.  APPEARANCE: Patient resting comfortably and in no acute distress. Patient is clean and well groomed, patient's clothing is properly fastened.  HEENT:   SKIN: The skin is warm and dry. Patient has normal skin turgor and moist mucus membranes.   MUSKULOSKELETAL: Patient is noted to have generalized weakness. no obvious deformities noted. Pulses intact.   RESPIRATORY: Airway is open and patent. Respirations are spontaneous and non-labored with normal effort and rate. Cough noted; no secretions noted at this time.   CARDIAC: Patient has a normal rate and rhythm.  No peripheral edema noted.   ABDOMEN: No distention noted. Soft and non-tender upon palpation.  NEUROLOGICAL: pupils 3 mm, PERRL. Facial expression is symmetrical. Hand grasps are equal bilaterally. Normal sensation in all extremities when touched with finger.

## 2022-08-14 LAB
BACTERIA BLD CULT: NORMAL
BACTERIA BLD CULT: NORMAL

## 2022-09-20 ENCOUNTER — OFFICE VISIT (OUTPATIENT)
Dept: NEUROLOGY | Facility: CLINIC | Age: 82
End: 2022-09-20
Payer: MEDICARE

## 2022-09-20 VITALS
WEIGHT: 175.5 LBS | HEIGHT: 68 IN | HEART RATE: 57 BPM | SYSTOLIC BLOOD PRESSURE: 119 MMHG | DIASTOLIC BLOOD PRESSURE: 72 MMHG | BODY MASS INDEX: 26.6 KG/M2

## 2022-09-20 DIAGNOSIS — Z71.89 COUNSELING REGARDING GOALS OF CARE: ICD-10-CM

## 2022-09-20 DIAGNOSIS — R26.81 GAIT INSTABILITY: ICD-10-CM

## 2022-09-20 DIAGNOSIS — G20.C PARKINSONISM, UNSPECIFIED PARKINSONISM TYPE: Primary | ICD-10-CM

## 2022-09-20 DIAGNOSIS — R42 VERTIGO: ICD-10-CM

## 2022-09-20 PROCEDURE — 3074F PR MOST RECENT SYSTOLIC BLOOD PRESSURE < 130 MM HG: ICD-10-PCS | Mod: CPTII,S$GLB,, | Performed by: PHYSICIAN ASSISTANT

## 2022-09-20 PROCEDURE — 99999 PR PBB SHADOW E&M-EST. PATIENT-LVL IV: ICD-10-PCS | Mod: PBBFAC,,, | Performed by: PHYSICIAN ASSISTANT

## 2022-09-20 PROCEDURE — 1101F PT FALLS ASSESS-DOCD LE1/YR: CPT | Mod: CPTII,S$GLB,, | Performed by: PHYSICIAN ASSISTANT

## 2022-09-20 PROCEDURE — 3078F PR MOST RECENT DIASTOLIC BLOOD PRESSURE < 80 MM HG: ICD-10-PCS | Mod: CPTII,S$GLB,, | Performed by: PHYSICIAN ASSISTANT

## 2022-09-20 PROCEDURE — 1126F PR PAIN SEVERITY QUANTIFIED, NO PAIN PRESENT: ICD-10-PCS | Mod: CPTII,S$GLB,, | Performed by: PHYSICIAN ASSISTANT

## 2022-09-20 PROCEDURE — 1160F PR REVIEW ALL MEDS BY PRESCRIBER/CLIN PHARMACIST DOCUMENTED: ICD-10-PCS | Mod: CPTII,S$GLB,, | Performed by: PHYSICIAN ASSISTANT

## 2022-09-20 PROCEDURE — 1126F AMNT PAIN NOTED NONE PRSNT: CPT | Mod: CPTII,S$GLB,, | Performed by: PHYSICIAN ASSISTANT

## 2022-09-20 PROCEDURE — 3288F FALL RISK ASSESSMENT DOCD: CPT | Mod: CPTII,S$GLB,, | Performed by: PHYSICIAN ASSISTANT

## 2022-09-20 PROCEDURE — 99214 PR OFFICE/OUTPT VISIT, EST, LEVL IV, 30-39 MIN: ICD-10-PCS | Mod: S$GLB,,, | Performed by: PHYSICIAN ASSISTANT

## 2022-09-20 PROCEDURE — 3074F SYST BP LT 130 MM HG: CPT | Mod: CPTII,S$GLB,, | Performed by: PHYSICIAN ASSISTANT

## 2022-09-20 PROCEDURE — 3288F PR FALLS RISK ASSESSMENT DOCUMENTED: ICD-10-PCS | Mod: CPTII,S$GLB,, | Performed by: PHYSICIAN ASSISTANT

## 2022-09-20 PROCEDURE — 99999 PR PBB SHADOW E&M-EST. PATIENT-LVL IV: CPT | Mod: PBBFAC,,, | Performed by: PHYSICIAN ASSISTANT

## 2022-09-20 PROCEDURE — 1160F RVW MEDS BY RX/DR IN RCRD: CPT | Mod: CPTII,S$GLB,, | Performed by: PHYSICIAN ASSISTANT

## 2022-09-20 PROCEDURE — 1159F MED LIST DOCD IN RCRD: CPT | Mod: CPTII,S$GLB,, | Performed by: PHYSICIAN ASSISTANT

## 2022-09-20 PROCEDURE — 1101F PR PT FALLS ASSESS DOC 0-1 FALLS W/OUT INJ PAST YR: ICD-10-PCS | Mod: CPTII,S$GLB,, | Performed by: PHYSICIAN ASSISTANT

## 2022-09-20 PROCEDURE — 1159F PR MEDICATION LIST DOCUMENTED IN MEDICAL RECORD: ICD-10-PCS | Mod: CPTII,S$GLB,, | Performed by: PHYSICIAN ASSISTANT

## 2022-09-20 PROCEDURE — 99214 OFFICE O/P EST MOD 30 MIN: CPT | Mod: S$GLB,,, | Performed by: PHYSICIAN ASSISTANT

## 2022-09-20 PROCEDURE — 3078F DIAST BP <80 MM HG: CPT | Mod: CPTII,S$GLB,, | Performed by: PHYSICIAN ASSISTANT

## 2022-09-20 RX ORDER — CARBIDOPA AND LEVODOPA 25; 100 MG/1; MG/1
1 TABLET ORAL 3 TIMES DAILY
Qty: 90 TABLET | Refills: 11 | Status: SHIPPED | OUTPATIENT
Start: 2022-09-20 | End: 2023-09-06

## 2022-09-20 NOTE — PROGRESS NOTES
"Name: Chano Guillen  MRN: [id number]   CSN: [id number]      Date: 09/20/2022    Referring physician:  Rob Self  No address on file    Chief Complaint: bradykinesia/ gait instability     History of Present Illness (HPI): Chano Guillen is a R HANDED 82 y.o. male with a medical issues significant for peripheral neuropathy, HTN, HLD, hypothyroidism and vertigo who presents for bradykinesia and gait instability. Accompanied by spouse. Whenever he amada over, his head starts spinning, whenever he stands up, it continues to spin. He was diagnosed with vertigo for 11 years. He has seen ENT for vertigo. He cannot get around like he used to, he has slowed down. If he walks fast, he loses balance. He has to take several steps to turn. Sleeps on his back, doesn't turn in bed. Denies tremors. No falls in the last year. Shuffles his feet. He has had droopy eyelids "all his life". His wife states that she has a picture of him whenever he was 5 and he had a droopy eyelids, R worse than L always. Wife has noticed that he uses his L hand more even though he is R handed.     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   HENT:  Negative for hearing loss.    Eyes:  Negative for blurred vision and double vision.   Respiratory:  Negative for cough, shortness of breath and stridor.    Cardiovascular:  Negative for chest pain and leg swelling.   Gastrointestinal:  Negative for constipation, diarrhea and nausea.   Genitourinary:  Positive for " frequency. Negative for urgency.   Musculoskeletal:  Negative for falls.   Skin:  Negative for itching and rash.   Neurological:  Positive for dizziness. Negative for tremors, loss of consciousness and weakness.   Psychiatric/Behavioral:  Negative for hallucinations and memory loss.          Past Medical History: The patient  has a past medical history of Dizziness, High cholesterol, Hypertension, Hypertrophy of prostate without urinary obstruction and other lower urinary tract symptoms (LUTS), Impotence of organic origin, Nocturia, and Urinary frequency.    Social History: The patient  reports that he has never smoked. He has never used smokeless tobacco. He reports that he does not drink alcohol and does not use drugs.    Family History: Their family history is not on file.    Allergies: Patient has no known allergies.     Meds:   Current Outpatient Medications on File Prior to Visit   Medication Sig Dispense Refill    amLODIPine (NORVASC) 10 MG tablet Take 1 tablet (10 mg total) by mouth once daily. 30 tablet 11    aspirin (ECOTRIN) 81 MG EC tablet Take 81 mg by mouth once daily.      cyanocobalamin, vitamin B-12, 1,000 mcg TbSR Take 1,000 mcg by mouth once daily.  0    finasteride (PROSCAR) 5 mg tablet Take 5 mg by mouth once daily.      fluticasone propionate (FLONASE) 50 mcg/actuation nasal spray by Each Nostril route as needed.      latanoprost 0.005 % ophthalmic solution       lisinopril (PRINIVIL,ZESTRIL) 40 MG tablet Take 40 mg by mouth once daily.      montelukast (SINGULAIR) 10 mg tablet Take 10 mg by mouth every evening.      MULTIVIT-MINERALS/FERROUS FUM (MULTI VITAMIN ORAL) Take by mouth.      VITAMIN D2 50,000 unit capsule 5,000 Units.   0    alendronate (FOSAMAX) 70 MG tablet Take 70 mg by mouth every 7 days.      ipratropium (ATROVENT) 42 mcg (0.06 %) nasal spray SMARTSI Spray(s) Both Nares Every 6 Hours PRN      methIMAzole (TAPAZOLE) 5 MG Tab Take 1 tablet (5 mg total) by mouth once daily.  "(Patient not taking: Reported on 9/20/2022) 90 tablet 3    omeprazole (PRILOSEC) 20 MG capsule Take 20 mg by mouth once daily.      ondansetron (ZOFRAN-ODT) 4 MG TbDL Take 1 tablet (4 mg total) by mouth every 6 (six) hours as needed (nausea). (Patient not taking: Reported on 9/20/2022) 12 tablet 0    rosuvastatin (CRESTOR) 40 MG Tab Take 40 mg by mouth once daily.      sod bicarb-sod chlor-neti pot (SINUS WASH NETIPOT) pkdv 1 packet by Nasal route 2 (two) times daily as needed (nasal congestion). (Patient not taking: Reported on 9/20/2022)       No current facility-administered medications on file prior to visit.       Exam:  /72 (BP Location: Left arm, Patient Position: Sitting, BP Method: Medium (Automatic))   Pulse (!) 57   Ht 5' 8" (1.727 m)   Wt 79.6 kg (175 lb 7.8 oz)   BMI 26.68 kg/m²     Constitutional  Well-developed, well-nourished, appears stated age   Ophthalmoscopic  No papilledema with no hemorrhages or exudates bilaterally   Cardiovascular  Radial pulses 2+ and symmetric, no LE edema bilaterally   Neurological    * Mental status  MOCA =      - Orientation  Oriented to person, place, time, and situation     - Memory   Intact recent and remote     - Attention/concentration  Attentive, vigilant during exam     - Language  Naming & repetition intact, +2-step commands     - Fund of knowledge  Aware of current events     - Executive  Well-organized thoughts     - Other     * Cranial nerves       - CN II  PERRL, visual fields full to confrontation     - CN III, IV, VI  Extraocular movements full, normal pursuits and saccades     - CN V  Sensation V1 - V3 intact     - CN VII  R > L ptosis      - CN VIII  Hearing intact bilaterally     - CN IX, X  Palate raises midline and symmetric     - CN XI  SCM and trapezius 5/5 bilaterally     - CN XII  Tongue midline   * Motor  Muscle bulk normal, strength 5/5 throughout   * Sensory   Intact to temperature and vibration throughout   * Coordination  No " dysmetria with finger-to-nose or heel-to-shin   * Gait  See below.   * Deep tendon reflexes  1+ and symmetric throughout UE   Trace patellar    Babinski downgoing bilaterally   * Specialized movement exam  mod hypophonic speech.    mod facial masking.   R > L, mostly at wrist, worse on diversion    Mild R > L  bradykinesia.   No tremor with rest, posture, kinesis, or intention.    No other dystonia, chorea, athetosis, myoclonus, or tics.   No motor impersistence.   narrow-based gait, slow shuffling gait    3-4 step en-bloc    Decreased arm swing bilaterally, R > L     Laboratory/Radiological:  - Results:  Admission on 08/09/2022, Discharged on 08/09/2022   Component Date Value Ref Range Status    WBC 08/09/2022 4.80  3.90 - 12.70 K/uL Final    RBC 08/09/2022 4.05 (L)  4.60 - 6.20 M/uL Final    Hemoglobin 08/09/2022 12.5 (L)  14.0 - 18.0 g/dL Final    Hematocrit 08/09/2022 36.3 (L)  40.0 - 54.0 % Final    MCV 08/09/2022 90  82 - 98 fL Final    MCH 08/09/2022 30.9  27.0 - 31.0 pg Final    MCHC 08/09/2022 34.4  32.0 - 36.0 g/dL Final    RDW 08/09/2022 12.3  11.5 - 14.5 % Final    Platelets 08/09/2022 179  150 - 450 K/uL Final    MPV 08/09/2022 9.5  9.2 - 12.9 fL Final    Immature Granulocytes 08/09/2022 0.4  0.0 - 0.5 % Final    Gran # (ANC) 08/09/2022 3.3  1.8 - 7.7 K/uL Final    Immature Grans (Abs) 08/09/2022 0.02  0.00 - 0.04 K/uL Final    Lymph # 08/09/2022 0.5 (L)  1.0 - 4.8 K/uL Final    Mono # 08/09/2022 0.9  0.3 - 1.0 K/uL Final    Eos # 08/09/2022 0.0  0.0 - 0.5 K/uL Final    Baso # 08/09/2022 0.01  0.00 - 0.20 K/uL Final    nRBC 08/09/2022 0  0 /100 WBC Final    Gran % 08/09/2022 69.1  38.0 - 73.0 % Final    Lymph % 08/09/2022 11.0 (L)  18.0 - 48.0 % Final    Mono % 08/09/2022 18.5 (H)  4.0 - 15.0 % Final    Eosinophil % 08/09/2022 0.8  0.0 - 8.0 % Final    Basophil % 08/09/2022 0.2  0.0 - 1.9 % Final    Differential Method 08/09/2022 Automated   Final    Sodium 08/09/2022 135 (L)  136 - 145 mmol/L Final     Potassium 08/09/2022 3.3 (L)  3.5 - 5.1 mmol/L Final    Chloride 08/09/2022 98  95 - 110 mmol/L Final    CO2 08/09/2022 27  23 - 29 mmol/L Final    Glucose 08/09/2022 95  70 - 110 mg/dL Final    BUN 08/09/2022 9  8 - 23 mg/dL Final    Creatinine 08/09/2022 0.9  0.5 - 1.4 mg/dL Final    Calcium 08/09/2022 10.0  8.7 - 10.5 mg/dL Final    Total Protein 08/09/2022 8.1  6.0 - 8.4 g/dL Final    Albumin 08/09/2022 4.6  3.5 - 5.2 g/dL Final    Total Bilirubin 08/09/2022 0.9  0.1 - 1.0 mg/dL Final    Alkaline Phosphatase 08/09/2022 47 (L)  55 - 135 U/L Final    AST 08/09/2022 34  10 - 40 U/L Final    ALT 08/09/2022 23  10 - 44 U/L Final    Anion Gap 08/09/2022 10  8 - 16 mmol/L Final    eGFR 08/09/2022 >60.0  >60 mL/min/1.73 m^2 Final    Lactate (Lactic Acid) 08/09/2022 1.1  0.5 - 2.2 mmol/L Final    Blood Culture, Routine 08/09/2022 No growth after 5 days.   Final    Blood Culture, Routine 08/09/2022 No growth after 5 days.   Final    SARS-CoV-2 RNA, Amplification, Qual 08/09/2022 Positive (A)  Negative Final    Specimen UA 08/09/2022 Urine, Clean Catch   Final    Color, UA 08/09/2022 Yellow  Yellow, Straw, Shandra Final    Appearance, UA 08/09/2022 Clear  Clear Final    pH, UA 08/09/2022 8.0  5.0 - 8.0 Final    Specific Gravity, UA 08/09/2022 1.015  1.005 - 1.030 Final    Protein, UA 08/09/2022 Negative  Negative Final    Glucose, UA 08/09/2022 Negative  Negative Final    Ketones, UA 08/09/2022 Trace (A)  Negative Final    Bilirubin (UA) 08/09/2022 Negative  Negative Final    Occult Blood UA 08/09/2022 Negative  Negative Final    Nitrite, UA 08/09/2022 Negative  Negative Final    Leukocytes, UA 08/09/2022 Negative  Negative Final    LD 08/09/2022 311 (H)  110 - 260 U/L Final    CPK 08/09/2022 913 (H)  20 - 200 U/L Final    Ferritin 08/09/2022 358 (H)  20.0 - 300.0 ng/mL Final    Troponin I 08/09/2022 0.009  0.000 - 0.026 ng/mL Final    CRP 08/09/2022 13.6 (H)  0.0 - 8.2 mg/L Final    BNP 08/09/2022 20  0 - 99 pg/mL Final    Admission on 07/22/2022, Discharged on 07/22/2022   Component Date Value Ref Range Status    WBC 07/22/2022 3.93  3.90 - 12.70 K/uL Final    RBC 07/22/2022 4.15 (L)  4.60 - 6.20 M/uL Final    Hemoglobin 07/22/2022 12.8 (L)  14.0 - 18.0 g/dL Final    Hematocrit 07/22/2022 36.6 (L)  40.0 - 54.0 % Final    MCV 07/22/2022 88  82 - 98 fL Final    MCH 07/22/2022 30.8  27.0 - 31.0 pg Final    MCHC 07/22/2022 35.0  32.0 - 36.0 g/dL Final    RDW 07/22/2022 12.6  11.5 - 14.5 % Final    Platelets 07/22/2022 172  150 - 450 K/uL Final    MPV 07/22/2022 9.3  9.2 - 12.9 fL Final    Immature Granulocytes 07/22/2022 0.3  0.0 - 0.5 % Final    Gran # (ANC) 07/22/2022 2.1  1.8 - 7.7 K/uL Final    Immature Grans (Abs) 07/22/2022 0.01  0.00 - 0.04 K/uL Final    Lymph # 07/22/2022 1.4  1.0 - 4.8 K/uL Final    Mono # 07/22/2022 0.4  0.3 - 1.0 K/uL Final    Eos # 07/22/2022 0.1  0.0 - 0.5 K/uL Final    Baso # 07/22/2022 0.03  0.00 - 0.20 K/uL Final    nRBC 07/22/2022 0  0 /100 WBC Final    Gran % 07/22/2022 52.1  38.0 - 73.0 % Final    Lymph % 07/22/2022 34.6  18.0 - 48.0 % Final    Mono % 07/22/2022 9.4  4.0 - 15.0 % Final    Eosinophil % 07/22/2022 2.8  0.0 - 8.0 % Final    Basophil % 07/22/2022 0.8  0.0 - 1.9 % Final    Differential Method 07/22/2022 Automated   Final    Sodium 07/22/2022 139  136 - 145 mmol/L Final    Potassium 07/22/2022 3.5  3.5 - 5.1 mmol/L Final    Chloride 07/22/2022 105  95 - 110 mmol/L Final    CO2 07/22/2022 23  23 - 29 mmol/L Final    Glucose 07/22/2022 107  70 - 110 mg/dL Final    BUN 07/22/2022 15  8 - 23 mg/dL Final    Creatinine 07/22/2022 1.1  0.5 - 1.4 mg/dL Final    Calcium 07/22/2022 9.5  8.7 - 10.5 mg/dL Final    Total Protein 07/22/2022 7.4  6.0 - 8.4 g/dL Final    Albumin 07/22/2022 4.2  3.5 - 5.2 g/dL Final    Total Bilirubin 07/22/2022 0.7  0.1 - 1.0 mg/dL Final    Alkaline Phosphatase 07/22/2022 36 (L)  55 - 135 U/L Final    AST 07/22/2022 23  10 - 40 U/L Final    ALT 07/22/2022 20  10 - 44 U/L  Final    Anion Gap 07/22/2022 11  8 - 16 mmol/L Final    eGFR if African American 07/22/2022 >60  >60 mL/min/1.73 m^2 Final    eGFR if non African American 07/22/2022 >60  >60 mL/min/1.73 m^2 Final    Troponin I 07/22/2022 0.006  0.000 - 0.026 ng/mL Final    BNP 07/22/2022 15  0 - 99 pg/mL Final    POC Rapid COVID 07/22/2022 Negative  Negative Final     Acceptable 07/22/2022 Yes   Final    Specimen UA 07/22/2022 Urine, Clean Catch   Final    Color, UA 07/22/2022 Yellow  Yellow, Straw, Shandra Final    Appearance, UA 07/22/2022 Clear  Clear Final    pH, UA 07/22/2022 7.0  5.0 - 8.0 Final    Specific Gravity, UA 07/22/2022 1.010  1.005 - 1.030 Final    Protein, UA 07/22/2022 Negative  Negative Final    Glucose, UA 07/22/2022 Negative  Negative Final    Ketones, UA 07/22/2022 Negative  Negative Final    Bilirubin (UA) 07/22/2022 Negative  Negative Final    Occult Blood UA 07/22/2022 Negative  Negative Final    Nitrite, UA 07/22/2022 Negative  Negative Final    Urobilinogen, UA 07/22/2022 Negative  <2.0 EU/dL Final    Leukocytes, UA 07/22/2022 Negative  Negative Final    POC Molecular Influenza A Ag 07/22/2022 Negative  Negative, Not Reported Final    POC Molecular Influenza B Ag 07/22/2022 Negative  Negative, Not Reported Final     Acceptable 07/22/2022 Yes   Final    TSH 07/22/2022 0.076 (L)  0.400 - 4.000 uIU/mL Final    Free T4 07/22/2022 0.78  0.71 - 1.51 ng/dL Final       - Independent review of images:      - Independent review of consultant's notes: Jovita Ahuja Lashgari     ASSESSMENT/PLAN:  Parkinsonism   - probable iPD x 3-4 years   - slow shuffling gait, R > L bradykinesia, cogwheel rigidity   - rec'd trial of cd/ld, may need to increase next visit       2. Vertigo   - referral to ENT   - positional sensation of room spinning   - does not appear to be consistent with orthostasis       3. 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 Flores, was available during today's encounter. Any change to plan along with cosign to appear in the EMR.       Total time spent with the patient: 47 minutes.  33 minutes of face-to-face consultation and 14 minutes of chart review and coordination of care, on the day of the visit. This includes face to face time and non-face to face time preparing to see the patient (eg, review of tests), obtaining and/or reviewing separately obtained history, documenting clinical information in the electronic or other health record, independently interpreting resultsand communicating results to the patient/family/caregiver, or care coordination.         Yin Live PA-C   [facility]  Department of Neurology  Movement Disorders

## 2022-12-13 ENCOUNTER — TELEPHONE (OUTPATIENT)
Dept: OTOLARYNGOLOGY | Facility: CLINIC | Age: 82
End: 2022-12-13
Payer: MEDICARE

## 2022-12-13 NOTE — TELEPHONE ENCOUNTER
Tried calling patient to reschedule appt due to incorrect scheduling. No answer, unable to leave vm due to vm being full.

## 2022-12-19 ENCOUNTER — TELEPHONE (OUTPATIENT)
Dept: NEUROLOGY | Facility: CLINIC | Age: 82
End: 2022-12-19
Payer: MEDICARE

## 2022-12-19 NOTE — TELEPHONE ENCOUNTER
BRITTAM seeking to confirm appointment with Yin Live for later today and to discuss earlier availability. I invited a call back, but will reach out via other lines.

## 2023-09-06 DIAGNOSIS — G20.C PARKINSONISM, UNSPECIFIED PARKINSONISM TYPE: ICD-10-CM

## 2023-09-06 RX ORDER — CARBIDOPA AND LEVODOPA 25; 100 MG/1; MG/1
1 TABLET ORAL 3 TIMES DAILY
Qty: 270 TABLET | Refills: 3 | Status: SHIPPED | OUTPATIENT
Start: 2023-09-06

## 2024-05-22 ENCOUNTER — PATIENT MESSAGE (OUTPATIENT)
Dept: NEUROLOGY | Facility: CLINIC | Age: 84
End: 2024-05-22
Payer: MEDICARE

## 2024-09-20 ENCOUNTER — PATIENT MESSAGE (OUTPATIENT)
Dept: NEUROLOGY | Facility: CLINIC | Age: 84
End: 2024-09-20
Payer: MEDICARE

## 2025-02-13 ENCOUNTER — OFFICE VISIT (OUTPATIENT)
Facility: CLINIC | Age: 85
End: 2025-02-13
Payer: MEDICARE

## 2025-02-13 ENCOUNTER — TELEPHONE (OUTPATIENT)
Facility: CLINIC | Age: 85
End: 2025-02-13
Payer: MEDICARE

## 2025-02-13 ENCOUNTER — LAB VISIT (OUTPATIENT)
Dept: LAB | Facility: HOSPITAL | Age: 85
End: 2025-02-13
Attending: PSYCHIATRY & NEUROLOGY
Payer: MEDICARE

## 2025-02-13 VITALS — BODY MASS INDEX: 26.68 KG/M2 | HEIGHT: 68 IN

## 2025-02-13 DIAGNOSIS — R13.19 DYSPHAGIA, NEUROLOGIC: ICD-10-CM

## 2025-02-13 DIAGNOSIS — R42 DIZZINESS AND GIDDINESS: ICD-10-CM

## 2025-02-13 DIAGNOSIS — R42 DIZZINESS AND GIDDINESS: Primary | ICD-10-CM

## 2025-02-13 LAB
CREAT SERPL-MCNC: 0.9 MG/DL (ref 0.5–1.4)
EST. GFR  (NO RACE VARIABLE): >60 ML/MIN/1.73 M^2

## 2025-02-13 PROCEDURE — 36415 COLL VENOUS BLD VENIPUNCTURE: CPT | Performed by: PSYCHIATRY & NEUROLOGY

## 2025-02-13 PROCEDURE — 99999 PR PBB SHADOW E&M-EST. PATIENT-LVL II: CPT | Mod: PBBFAC,,, | Performed by: PSYCHIATRY & NEUROLOGY

## 2025-02-13 PROCEDURE — 82565 ASSAY OF CREATININE: CPT | Performed by: PSYCHIATRY & NEUROLOGY

## 2025-02-13 NOTE — PROGRESS NOTES
"Name: Chano Guillen  MRN: [id number]   CSN: [id number]      Date: 02/13/2025    Referring physician:  No referring provider defined for this encounter.    Chief Complaint: bradykinesia/ gait instability     Interval Hx    Since last visit,   Sees Yin Alicea today    Come for help with vertigo  He notes for 6 months he's had "dizziness" which he describes as room spilling when he turns his head or when standing.  Tried Ldopa and dizziness got worse - not on PD therapy today  R foot dystonia fluctuates throughout day  Dysphagia progressive- coughing after food at times    MRI w/o 2025 WNL     History of Present Illness (HPI): Chano Guillen is a R HANDED 84 y.o. male with a medical issues significant for peripheral neuropathy, HTN, HLD, hypothyroidism and vertigo who presents for bradykinesia and gait instability. Accompanied by spouse. Whenever he amada over, his head starts spinning, whenever he stands up, it continues to spin. He was diagnosed with vertigo for 11 years. He has seen ENT for vertigo. He cannot get around like he used to, he has slowed down. If he walks fast, he loses balance. He has to take several steps to turn. Sleeps on his back, doesn't turn in bed. Denies tremors. No falls in the last year. Shuffles his feet. He has had droopy eyelids "all his life". His wife states that she has a picture of him whenever he was 5 and he had a droopy eyelids, R worse than L always. Wife has noticed that he uses his L hand more even though he is R handed.     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   HENT:  Negative for hearing loss.    Eyes:  Negative for blurred vision and double vision.   Respiratory:  Negative for cough, shortness of breath and stridor.    Cardiovascular:  Negative for chest pain and leg swelling.   Gastrointestinal:  Negative for constipation, diarrhea and nausea.   Genitourinary:  Positive for frequency. Negative for urgency.   Musculoskeletal:  Negative for falls.   Skin:  Negative for itching and rash.   Neurological:  Positive for dizziness. Negative for tremors, loss of consciousness and weakness.   Psychiatric/Behavioral:  Negative for hallucinations and memory loss.            Past Medical History: The patient  has a past medical history of Dizziness, High cholesterol, Hypertension, Hypertrophy of prostate without urinary obstruction and other lower urinary tract symptoms (LUTS), Impotence of organic origin, Nocturia, and Urinary frequency.    Social History: The patient  reports that he has never smoked. He has never used smokeless tobacco. He reports that he does not drink alcohol and does not use drugs.    Family History: Their family history is not on file.    Allergies: Patient has no known allergies.     Meds:   Current Outpatient Medications on File Prior to Visit   Medication Sig Dispense Refill    alendronate (FOSAMAX) 70 MG tablet Take 70 mg by mouth every 7 days.      amLODIPine (NORVASC) 10 MG tablet Take 1 tablet (10 mg total) by mouth once daily. 30 tablet 11    aspirin (ECOTRIN) 81 MG EC tablet Take 81 mg by mouth once daily.      carbidopa-levodopa  mg (SINEMET)  mg per tablet TAKE 1 TABLET THREE TIMES DAILY 270 tablet 3    cyanocobalamin, vitamin B-12, 1,000 mcg TbSR Take 1,000 mcg by mouth once daily.  0    finasteride (PROSCAR) 5 mg tablet Take 5 mg by mouth once daily.      fluticasone propionate (FLONASE) 50 mcg/actuation nasal spray by Each Nostril route  "as needed.      ipratropium (ATROVENT) 42 mcg (0.06 %) nasal spray SMARTSI Spray(s) Both Nares Every 6 Hours PRN      latanoprost 0.005 % ophthalmic solution       lisinopril (PRINIVIL,ZESTRIL) 40 MG tablet Take 40 mg by mouth once daily.      methIMAzole (TAPAZOLE) 5 MG Tab Take 1 tablet (5 mg total) by mouth once daily. (Patient not taking: Reported on 2022) 90 tablet 3    montelukast (SINGULAIR) 10 mg tablet Take 10 mg by mouth every evening.      MULTIVIT-MINERALS/FERROUS FUM (MULTI VITAMIN ORAL) Take by mouth.      omeprazole (PRILOSEC) 20 MG capsule Take 20 mg by mouth once daily.      ondansetron (ZOFRAN-ODT) 4 MG TbDL Take 1 tablet (4 mg total) by mouth every 6 (six) hours as needed (nausea). (Patient not taking: Reported on 2022) 12 tablet 0    rosuvastatin (CRESTOR) 40 MG Tab Take 40 mg by mouth once daily.      sod bicarb-sod chlor-neti pot (SINUS WASH NETIPOT) pkdv 1 packet by Nasal route 2 (two) times daily as needed (nasal congestion). (Patient not taking: Reported on 2022)      VITAMIN D2 50,000 unit capsule 5,000 Units.   0     No current facility-administered medications on file prior to visit.       Exam:  Ht 5' 8" (1.727 m)   BMI 26.68 kg/m²     Constitutional  Well-developed, well-nourished, appears stated age   Ophthalmoscopic  No papilledema with no hemorrhages or exudates bilaterally   Cardiovascular  Radial pulses 2+ and symmetric, no LE edema bilaterally   Neurological    * Mental status  MOCA =      - Orientation  Oriented to person, place, time, and situation     - Memory   Intact recent and remote     - Attention/concentration  Attentive, vigilant during exam     - Language  Naming & repetition intact, +2-step commands     - Fund of knowledge  Aware of current events     - Executive  Well-organized thoughts     - Other     * Cranial nerves       - CN II  PERRL, visual fields full to confrontation     - CN III, IV, VI  Extraocular movements full, normal pursuits and " saccades     - CN V  Sensation V1 - V3 intact     - CN VII  R > L ptosis      - CN VIII  Hearing intact bilaterally     - CN IX, X  Palate raises midline and symmetric     - CN XI  SCM and trapezius 5/5 bilaterally     - CN XII  Tongue midline   * Motor  Muscle bulk normal, strength 5/5 throughout   * Sensory   Intact to temperature and vibration throughout   * Coordination  No dysmetria with finger-to-nose or heel-to-shin   * Gait  See below.   * Deep tendon reflexes  1+ and symmetric throughout UE   Trace patellar    Babinski downgoing bilaterally   * Specialized movement exam  mod hypophonic speech.    Severe  facial masking.   R > L, mostly at wrist, worse on diversion    Mild R > L  bradykinesia.   No tremor with rest, posture, kinesis, or intention.    No other dystonia, chorea, athetosis, myoclonus, or tics.   No motor impersistence.   narrow-based gait, slow shuffling gait    5 steps en-bloc    Decreased arm swing bilaterally, R > L     Laboratory/Radiological:  - Results:  No visits with results within 3 Month(s) from this visit.   Latest known visit with results is:   Admission on 08/09/2022, Discharged on 08/09/2022   Component Date Value Ref Range Status    WBC 08/09/2022 4.80  3.90 - 12.70 K/uL Final    RBC 08/09/2022 4.05 (L)  4.60 - 6.20 M/uL Final    Hemoglobin 08/09/2022 12.5 (L)  14.0 - 18.0 g/dL Final    Hematocrit 08/09/2022 36.3 (L)  40.0 - 54.0 % Final    MCV 08/09/2022 90  82 - 98 fL Final    MCH 08/09/2022 30.9  27.0 - 31.0 pg Final    MCHC 08/09/2022 34.4  32.0 - 36.0 g/dL Final    RDW 08/09/2022 12.3  11.5 - 14.5 % Final    Platelets 08/09/2022 179  150 - 450 K/uL Final    MPV 08/09/2022 9.5  9.2 - 12.9 fL Final    Immature Granulocytes 08/09/2022 0.4  0.0 - 0.5 % Final    Gran # (ANC) 08/09/2022 3.3  1.8 - 7.7 K/uL Final    Immature Grans (Abs) 08/09/2022 0.02  0.00 - 0.04 K/uL Final    Lymph # 08/09/2022 0.5 (L)  1.0 - 4.8 K/uL Final    Mono # 08/09/2022 0.9  0.3 - 1.0 K/uL Final    Eos #  08/09/2022 0.0  0.0 - 0.5 K/uL Final    Baso # 08/09/2022 0.01  0.00 - 0.20 K/uL Final    nRBC 08/09/2022 0  0 /100 WBC Final    Gran % 08/09/2022 69.1  38.0 - 73.0 % Final    Lymph % 08/09/2022 11.0 (L)  18.0 - 48.0 % Final    Mono % 08/09/2022 18.5 (H)  4.0 - 15.0 % Final    Eosinophil % 08/09/2022 0.8  0.0 - 8.0 % Final    Basophil % 08/09/2022 0.2  0.0 - 1.9 % Final    Differential Method 08/09/2022 Automated   Final    Sodium 08/09/2022 135 (L)  136 - 145 mmol/L Final    Potassium 08/09/2022 3.3 (L)  3.5 - 5.1 mmol/L Final    Chloride 08/09/2022 98  95 - 110 mmol/L Final    CO2 08/09/2022 27  23 - 29 mmol/L Final    Glucose 08/09/2022 95  70 - 110 mg/dL Final    BUN 08/09/2022 9  8 - 23 mg/dL Final    Creatinine 08/09/2022 0.9  0.5 - 1.4 mg/dL Final    Calcium 08/09/2022 10.0  8.7 - 10.5 mg/dL Final    Total Protein 08/09/2022 8.1  6.0 - 8.4 g/dL Final    Albumin 08/09/2022 4.6  3.5 - 5.2 g/dL Final    Total Bilirubin 08/09/2022 0.9  0.1 - 1.0 mg/dL Final    Alkaline Phosphatase 08/09/2022 47 (L)  55 - 135 U/L Final    AST 08/09/2022 34  10 - 40 U/L Final    ALT 08/09/2022 23  10 - 44 U/L Final    Anion Gap 08/09/2022 10  8 - 16 mmol/L Final    eGFR 08/09/2022 >60.0  >60 mL/min/1.73 m^2 Final    Lactate (Lactic Acid) 08/09/2022 1.1  0.5 - 2.2 mmol/L Final    Blood Culture, Routine 08/09/2022 No growth after 5 days.   Final    Blood Culture, Routine 08/09/2022 No growth after 5 days.   Final    SARS-CoV-2 RNA, Amplification, Qual 08/09/2022 Positive (A)  Negative Final    Specimen UA 08/09/2022 Urine, Clean Catch   Final    Color, UA 08/09/2022 Yellow  Yellow, Straw, Shandra Final    Appearance, UA 08/09/2022 Clear  Clear Final    pH, UA 08/09/2022 8.0  5.0 - 8.0 Final    Specific Gravity, UA 08/09/2022 1.015  1.005 - 1.030 Final    Protein, UA 08/09/2022 Negative  Negative Final    Glucose, UA 08/09/2022 Negative  Negative Final    Ketones, UA 08/09/2022 Trace (A)  Negative Final    Bilirubin (UA) 08/09/2022  Negative  Negative Final    Occult Blood UA 08/09/2022 Negative  Negative Final    Nitrite, UA 08/09/2022 Negative  Negative Final    Leukocytes, UA 08/09/2022 Negative  Negative Final    LD 08/09/2022 311 (H)  110 - 260 U/L Final    CPK 08/09/2022 913 (H)  20 - 200 U/L Final    Ferritin 08/09/2022 358 (H)  20.0 - 300.0 ng/mL Final    Troponin I 08/09/2022 0.009  0.000 - 0.026 ng/mL Final    CRP 08/09/2022 13.6 (H)  0.0 - 8.2 mg/L Final    BNP 08/09/2022 20  0 - 99 pg/mL Final       - Independent review of images:      - Independent review of consultant's notes: Jovita Ahuja Lashgari     ASSESSMENT/PLAN:  Parkinsonism   - probable iPD x 3-4 years   - slow shuffling gait, R > L bradykinesia, cogwheel rigidity   - failed Ldopa due to likely exacerbation of orthostasis  -will try amanatdine 100mg BID to 50mg BID after dizziness subsided    2. Vertigo   - MRI brain wwo, vestibular therapy for possible BPPV      3. 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

## 2025-02-13 NOTE — TELEPHONE ENCOUNTER
Called PT's wife today to inform them that they have been miss booked for 20 mins. Since they are new PT they must have 40 mins. They can come early if not they will need to be rescheduled per doctors orders. PT wife did not  left a message. Tried the second number MAILBOX IS FULL AND CANNOT TAKE ANY MESSAGES.

## 2025-02-27 ENCOUNTER — TELEPHONE (OUTPATIENT)
Dept: SPEECH THERAPY | Facility: HOSPITAL | Age: 85
End: 2025-02-27
Payer: MEDICARE

## 2025-02-28 ENCOUNTER — HOSPITAL ENCOUNTER (OUTPATIENT)
Dept: RADIOLOGY | Facility: HOSPITAL | Age: 85
Discharge: HOME | End: 2025-02-28
Attending: PSYCHIATRY & NEUROLOGY
Payer: MEDICARE

## 2025-02-28 DIAGNOSIS — R42 DIZZINESS AND GIDDINESS: ICD-10-CM

## 2025-02-28 PROCEDURE — 70553 MRI BRAIN STEM W/O & W/DYE: CPT | Mod: 26,,, | Performed by: RADIOLOGY

## 2025-02-28 PROCEDURE — 70553 MRI BRAIN STEM W/O & W/DYE: CPT | Mod: TC

## 2025-02-28 PROCEDURE — 25500020 PHARM REV CODE 255: Performed by: PSYCHIATRY & NEUROLOGY

## 2025-02-28 PROCEDURE — A9585 GADOBUTROL INJECTION: HCPCS | Performed by: PSYCHIATRY & NEUROLOGY

## 2025-02-28 RX ORDER — GADOBUTROL 604.72 MG/ML
8 INJECTION INTRAVENOUS
Status: COMPLETED | OUTPATIENT
Start: 2025-02-28 | End: 2025-02-28

## 2025-02-28 RX ADMIN — GADOBUTROL 8 ML: 604.72 INJECTION INTRAVENOUS at 01:02

## 2025-03-06 ENCOUNTER — TELEPHONE (OUTPATIENT)
Facility: CLINIC | Age: 85
End: 2025-03-06
Payer: MEDICARE

## 2025-03-06 NOTE — TELEPHONE ENCOUNTER
----- Message from Zachary Suarez sent at 3/6/2025 10:47 AM CST -----  Regarding: FW: Wife called [place] like to speak with someone regarding the results of the MRI  Contact: [phone]  They want to cancel the research appt  ----- Message -----  From: Nicolette Anthony  Sent: 3/6/2025  10:05 AM CST  To: [facility] Velas[facility] Staff  Subject: Wife called [place] like to speak with so#    Name of Who is Calling:Tamika  What is the request in detail:Pt called [place] like to speak with someone regarding the results of the MRI. Wife also wants to cancel appt that's schduled for tomorrow. Please advise   Can the clinic reply by [facility]:No  What Number to Call Back if not in [facility]: Telephone Information:Mobile         [phone]  ----- Message -----  From: Nicolette Anthony  Sent: 3/6/2025  10:04 AM CST  To: [facility] Velasquez Staff  Subject: Wife called [place] like to speak with so#    Name of Who is Calling:Tamika  What is the request in detail:Pt called [place] like to speak with someone regarding the results of the MRI. Please advise   Can the clinic reply by [facility]:No  What Number to Call Back if not in [facility]: Telephone Information:Mobile         [phone]

## 2025-03-06 NOTE — TELEPHONE ENCOUNTER
----- Message from Nicolette sent at 3/6/2025 10:02 AM CST -----  Regarding: Wife called [place] like to speak with someone regarding the results of the MRI  Contact: [phone]  Name of Who is Calling:Tamika  What is the request in detail:Pt called [place] like to speak with someone regarding the results of the MRI. Wife also wants to cancel appt that's schduled for tomorrow. Please advise   Can the clinic reply by [facility]:No  What Number to Call Back if not in [facility]: Telephone Information:Mobile         [phone]  ----- Message -----  From: Nicolette Anthony  Sent: 3/6/2025  10:04 AM CST  To: [facility] Velasquez Staff  Subject: Wife called [place] like to speak with so#    Name of Who is Calling:Tamika  What is the request in detail:Pt called [place] like to speak with someone regarding the results of the MRI. Please advise   Can the clinic reply by [facility]:No  What Number to Call Back if not in [facility]: Telephone Information:Mobile         [phone]

## 2025-03-06 NOTE — TELEPHONE ENCOUNTER
Returned call to Tamika regarding Mr. Guillen's appointment tomorrow. Discussed the need to come in for appointment to discuss the MRI

## 2025-03-07 ENCOUNTER — OFFICE VISIT (OUTPATIENT)
Facility: CLINIC | Age: 85
End: 2025-03-07
Payer: MEDICARE

## 2025-03-07 ENCOUNTER — LAB VISIT (OUTPATIENT)
Dept: LAB | Facility: HOSPITAL | Age: 85
End: 2025-03-07
Attending: PSYCHIATRY & NEUROLOGY
Payer: MEDICARE

## 2025-03-07 ENCOUNTER — RESEARCH ENCOUNTER (OUTPATIENT)
Dept: RESEARCH | Facility: HOSPITAL | Age: 85
End: 2025-03-07
Payer: MEDICARE

## 2025-03-07 VITALS
HEART RATE: 54 BPM | SYSTOLIC BLOOD PRESSURE: 156 MMHG | BODY MASS INDEX: 26.6 KG/M2 | DIASTOLIC BLOOD PRESSURE: 83 MMHG | HEIGHT: 68 IN | WEIGHT: 175.5 LBS

## 2025-03-07 DIAGNOSIS — R42 VERTIGO: ICD-10-CM

## 2025-03-07 DIAGNOSIS — G20.C PARKINSONISM, UNSPECIFIED PARKINSONISM TYPE: ICD-10-CM

## 2025-03-07 DIAGNOSIS — R35.0 FREQUENCY OF MICTURITION: Primary | ICD-10-CM

## 2025-03-07 LAB
DRUG STUDY SPECIMEN TYPE: NORMAL
DRUG STUDY TEST NAME: NORMAL
DRUG STUDY TEST RESULT: NORMAL

## 2025-03-07 PROCEDURE — 36415 COLL VENOUS BLD VENIPUNCTURE: CPT | Performed by: PSYCHIATRY & NEUROLOGY

## 2025-03-07 PROCEDURE — 99000 SPECIMEN HANDLING OFFICE-LAB: CPT | Performed by: PSYCHIATRY & NEUROLOGY

## 2025-03-07 PROCEDURE — 99999 PR PBB SHADOW E&M-EST. PATIENT-LVL III: CPT | Mod: PBBFAC,,, | Performed by: PSYCHIATRY & NEUROLOGY

## 2025-03-07 RX ORDER — OMEPRAZOLE 20 MG/1
20 CAPSULE, DELAYED RELEASE ORAL DAILY
COMMUNITY

## 2025-03-07 RX ORDER — AMANTADINE HYDROCHLORIDE 100 MG/1
TABLET ORAL
Qty: 60 TABLET | Refills: 11 | Status: SHIPPED | OUTPATIENT
Start: 2025-03-07

## 2025-03-07 NOTE — PROGRESS NOTES
"Name: Chano Guillen  MRN: [id number]   CSN: [id number]      Date: 03/07/2025    Referring physician:  No referring provider defined for this encounter.    Chief Complaint: bradykinesia/ gait instability     Interval Hx    Since last visit,   Sees Yin Andino    Has not yet started vestibular rehab  MRI brain today reviewed WNL  Comes for help with vertigo  Has not yet followed up with ENT - found bilat vestibular weakness  He notes for 6 months he's had "dizziness" which he describes as room spilling when he turns his head or when standing.  Tried Ldopa and dizziness got worse - not on PD therapy today  R foot dystonia fluctuates throughout day  Dysphagia progressive- coughing after food at times    Has not yet tried amantadine 50mg BID    MRI w/o 2025 WNL     History of Present Illness (HPI): Chano Gulilen is a R HANDED 84 y.o. male with a medical issues significant for peripheral neuropathy, HTN, HLD, hypothyroidism and vertigo who presents for bradykinesia and gait instability. Accompanied by spouse. Whenever he amada over, his head starts spinning, whenever he stands up, it continues to spin. He was diagnosed with vertigo for 11 years. He has seen ENT for vertigo. He cannot get around like he used to, he has slowed down. If he walks fast, he loses balance. He has to take several steps to turn. Sleeps on his back, doesn't turn in bed. Denies tremors. No falls in the last year. Shuffles his feet. He has had droopy eyelids "all his life". His wife states that she has a picture of him whenever he was 5 and he had a droopy eyelids, R worse than L always. Wife has noticed that he uses his L hand more even though he is R handed.     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   HENT:  Negative for hearing loss.    Eyes:  Negative for blurred vision and double vision.   Respiratory:  Negative for cough, shortness of breath and stridor.    Cardiovascular:  Negative for chest pain and leg swelling.   Gastrointestinal:  Negative for constipation, diarrhea and nausea.   Genitourinary:  Positive for frequency. Negative for urgency.   Musculoskeletal:  Negative for falls.   Skin:  Negative for itching and rash.   Neurological:  Positive for dizziness. Negative for tremors, loss of consciousness and weakness.   Psychiatric/Behavioral:  Negative for hallucinations and memory loss.            Past Medical History: The patient  has a past medical history of Dizziness, High cholesterol, Hypertension, Hypertrophy of prostate without urinary obstruction and other lower urinary tract symptoms (LUTS), Impotence of organic origin, Nocturia, and Urinary frequency.    Social History: The patient  reports that he has never smoked. He has never used smokeless tobacco. He reports that he does not drink alcohol and does not use drugs.    Family History: Their family history is not on file.    Allergies: Patient has no known allergies.     Meds:   Current Outpatient Medications on File Prior to Visit   Medication Sig Dispense Refill    alendronate (FOSAMAX) 70 MG tablet Take 70 mg by mouth every 7 days.      aspirin (ECOTRIN) 81 MG EC tablet Take 81 mg by mouth once daily.      fluticasone propionate (FLONASE) 50 mcg/actuation nasal spray by Each Nostril route as needed.      latanoprost 0.005 % ophthalmic solution 1 drop once daily.      lisinopril (PRINIVIL,ZESTRIL) 40 MG tablet Take 40 mg by mouth once daily.      MULTIVIT-MINERALS/FERROUS FUM (MULTI  "VITAMIN ORAL) Take by mouth.      omeprazole (PRILOSEC) 20 MG capsule Take 20 mg by mouth once daily.      VITAMIN D2 50,000 unit capsule 5,000 Units.   0    [DISCONTINUED] montelukast (SINGULAIR) 10 mg tablet Take 10 mg by mouth every evening.      amLODIPine (NORVASC) 10 MG tablet Take 1 tablet (10 mg total) by mouth once daily. 30 tablet 11    ipratropium (ATROVENT) 42 mcg (0.06 %) nasal spray SMARTSI Spray(s) Both Nares Every 6 Hours PRN (Patient not taking: Reported on 3/7/2025)      [DISCONTINUED] carbidopa-levodopa  mg (SINEMET)  mg per tablet TAKE 1 TABLET THREE TIMES DAILY (Patient not taking: Reported on 3/7/2025) 270 tablet 3    [DISCONTINUED] cyanocobalamin, vitamin B-12, 1,000 mcg TbSR Take 1,000 mcg by mouth once daily. (Patient not taking: Reported on 3/7/2025)  0    [DISCONTINUED] finasteride (PROSCAR) 5 mg tablet Take 5 mg by mouth once daily. (Patient not taking: Reported on 3/7/2025)      [DISCONTINUED] methIMAzole (TAPAZOLE) 5 MG Tab Take 1 tablet (5 mg total) by mouth once daily. (Patient not taking: Reported on 2022) 90 tablet 3    [DISCONTINUED] omeprazole (PRILOSEC) 20 MG capsule Take 20 mg by mouth once daily. (Patient not taking: Reported on 3/7/2025)      [DISCONTINUED] ondansetron (ZOFRAN-ODT) 4 MG TbDL Take 1 tablet (4 mg total) by mouth every 6 (six) hours as needed (nausea). (Patient not taking: Reported on 3/7/2025) 12 tablet 0    [DISCONTINUED] rosuvastatin (CRESTOR) 40 MG Tab Take 40 mg by mouth once daily. (Patient not taking: Reported on 3/7/2025)      [DISCONTINUED] sod bicarb-sod chlor-neti pot (SINUS WASH NETIPOT) pkdv 1 packet by Nasal route 2 (two) times daily as needed (nasal congestion). (Patient not taking: Reported on 3/7/2025)       No current facility-administered medications on file prior to visit.       Exam:  BP (!) 156/83 (BP Location: Left arm, Patient Position: Sitting)   Pulse (!) 54   Ht 5' 8" (1.727 m)   Wt 79.6 kg (175 lb 7.8 oz)   BMI " 26.68 kg/m²     Constitutional  Well-developed, well-nourished, appears stated age   Ophthalmoscopic  No papilledema with no hemorrhages or exudates bilaterally   Cardiovascular  Radial pulses 2+ and symmetric, no LE edema bilaterally   Neurological    * Mental status  MOCA =      - Orientation  Oriented to person, place, time, and situation     - Memory   Intact recent and remote     - Attention/concentration  Attentive, vigilant during exam     - Language  Naming & repetition intact, +2-step commands     - Fund of knowledge  Aware of current events     - Executive  Well-organized thoughts     - Other     * Cranial nerves       - CN II  PERRL, visual fields full to confrontation     - CN III, IV, VI  Extraocular movements full, normal pursuits and saccades     - CN V  Sensation V1 - V3 intact     - CN VII  R > L ptosis      - CN VIII  Hearing intact bilaterally     - CN IX, X  Palate raises midline and symmetric     - CN XI  SCM and trapezius 5/5 bilaterally     - CN XII  Tongue midline   * Motor  Muscle bulk normal, strength 5/5 throughout   * Sensory   Intact to temperature and vibration throughout   * Coordination  No dysmetria with finger-to-nose or heel-to-shin   * Gait  See below.   * Deep tendon reflexes  1+ and symmetric throughout UE   Trace patellar    Babinski downgoing bilaterally   * Specialized movement exam  mod hypophonic speech.    Severe  facial masking.   R > L, mostly at wrist, worse on diversion    Mild R > L  bradykinesia.   No tremor with rest, posture, kinesis, or intention.    No other dystonia, chorea, athetosis, myoclonus, or tics.   No motor impersistence.   narrow-based gait, slow shuffling gait    5 steps en-bloc    Decreased arm swing bilaterally, R > L     Laboratory/Radiological:  - Results:  Lab Visit on 02/13/2025   Component Date Value Ref Range Status    Creatinine 02/13/2025 0.9  0.5 - 1.4 mg/dL Final    eGFR 02/13/2025 >60.0  >60 mL/min/1.73 m^2 Final       - Independent  review of images:      - Independent review of consultant's notes: Jovtia Ahuja Lashgari     ASSESSMENT/PLAN:  Parkinsonism   - probable iPD x 3-4 years   - slow shuffling gait, R > L bradykinesia, cogwheel rigidity   - failed Ldopa due to likely exacerbation of orthostasis  -will try amanatdine 100mg BID to 50mg BID after dizziness subsided    2. Vertigo   - MRI brain wwo, vestibular therapy for possible BPPV      3. 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   *must provide value   [x]  Yes   []  No      Do you have any neurological condition diagnosed by a neurologist specialist?   *must provide value   [x]  Yes, I have been diagnosed with:   []  No     What other condition(s) have you been diagnosed with? PD   Have you been involved in a Parkinson's disease genetics study before (i.e. PPMI, [facility] [address] [place] [address], [employer])?   *must provide value    []  Yes (if so, please specify the name of the study or company):   [x]  No      Did your parents or full siblings have Parkinson's disease or any neurodegenerative disease?   *must provide value   []  Yes   [x]  No   []  Unknown     Please specify family member relation to you and the neurodegenerative disease.    Did your grandparents, half-siblings, uncles, aunts have Parkinson's disease or any neurodegenerative disease?   *must provide value   []  Yes   [x]  No   []  Unknown     Please specify family member relation to you. For example, maternal grandmother or paternal uncle, has the neurodegenerative disease.    Supportive Criteria:    Presence of bradykinesia   *must provide value   [x]  Yes   []  No      Presence of rest tremor   *must  provide value   []  Yes   [x]  No      Presence of rigidity   *must provide value   [x]  Yes   []  No        1. Clear and dramatic beneficial response to dopaminergic therapy. During initial treatment, patient returned to normal or near-normal level of function. In absence of clear documentation of initial response a dramatic response can be classified as:   a.) Marked improvement with dose increases or marked worsening with dose decreases. Mild changes do not qualify. Document this either objectively (>30% in UPDRS III with change in treatment), or subjectively (clearly-documented history of marked changes from a reliable patient or caregiver).   b.) Unequivocal and marked on/off fluctuations, which must have at some point included predictable end-of-dose wearing off.   *must provide value   []  Yes   [x]  No      2. Presence of levodopa-induced dyskinesia   *must provide value   []  Yes   [x]  No      3. Rest tremor of a limb, documented on clinical examination (in past, or on current examination)   *must provide value   []  Yes   [x]  No      4. The presence of either olfactory loss or cardiac sympathetic denervation on MIBG scintigraphy   *must provide value   []  Yes   [x]  No        Absolute exclusion criteria: The presence of any of these features rules out PD:    1. Unequivocal cerebellar abnormalities, such as cerebellar gait, limb ataxia, or cerebellar oculomotor abnormalities (eg, sustained gaze evoked nystag- mus, macro square wave jerks, hypermetric saccades)   *must provide value   []  Yes   [x]  No      2. Downward vertical supranuclear gaze palsy, or selective slowing of downward vertical saccades   *must provide value   []  Yes   [x]  No      3. Diagnosis of probable behavioral variant frontotemporal dementia or primary progressive aphasia, defined according to consensus [id number] within the first 5 y of disease  *must provide value   []  Yes   [x]  No      4. Parkinsonian features restricted to  the lower limbs for more than 3 y   *must provide value   []  Yes   [x]  No      5. Treatment with a dopamine receptor blocker or a dopamine-depleting agent in a dose and time-course consistent with drug-induced parkinsonism   *must provide value    []  Yes   []  No      6. Absence of observable response to high-dose levodopa despite at least moderate severity of disease  *must provide value []  Yes   [x]  No    7. Unequivocal cortical sensory loss (I.e. graphesthesia, stereognosis with intact primary sensory modalities), clear limb ideomotor apraxia, or progressive aphasia  *must provide value []  Yes   [x]  No    8. Normal function neuroimaging of the presynaptic dopaminergic system  *must provide value []  Yes   [x]  No    9. Documentation of an alternative condition known to produce parkinsonism and plausibly connected to the patient's symptoms, or, the expert evaluating physician, based on the full diagnostic assessment feels that an alternative syndrome is more likely than PD  *must provide value []  Yes   [x]  No        Red Flags    1. Rapid progression of gait impairment requiring regular use of wheelchair within 5 y of onset   *must provide value   []  Yes   [x]  No      2. A complete absence of progression of motor symptoms or signs over 5 or more y unless stability is related to treatment   *must provide value   []  Yes   [x]  No      3. Early bulbar dysfunction: severe dysphonia or dysarthria (speech unintelligible most of   the time) or severe dysphagia (requiring soft food, NG tube, or gastrostomy feeding) within first 5 y   *must provide value   []  Yes   [x]  No      4. Inspiratory respiratory dysfunction: either diurnal or nocturnal inspiratory stridor or frequent inspiratory signs   *must provide value   []  Yes   [x]  No        5. Severe autonomic failure in the first 5 y of disease. This can include:   a) Orthostatic hypotension--orthostatic decrease of blood pressure within 3 min of standing by  at least 30 mm Hg systolic or 15 mm Hg diastolic, in the absence of dehydration, medication, or other diseases that could plausibly explain autonomic dysfunction, OR     b) Severe urinary retention or urinary incontinence in the first 5 y of disease (excluding long-standing or small amount stress incontinence in women), that is not simply functional incontinence. In men, urinary retention must not be attributable to prostate disease, and must be associated with erectile dysfunction   *must provide value   []  Yes   [x]  No      6. Recurrent (>1/y) falls because of impaired balance within 3 y of onset   *must provide value   []  Yes   [x]  No      7. Disproportionate anterocollis (dystonic) or contractures of hand or feet within the first 10y   *must provide value   []  Yes   [x]  No      8. Absence of any of the common nonmotor features of disease despite 5 y disease duration. These include sleep dysfunction (sleep-maintenance insomnia, excessive daytime somnolence, symptoms of REM sleep behavior disorder), autonomic dysfunction (constipation, daytime urinary urgency, symptomatic orthostasis), hyposmia, or psychiatric dysfunction   (depression, anxiety, or hallucinations)   *must provide value   []  Yes   [x]  No      9. Otherwise-unexplained pyramidal tract signs, defined as pyramidal weakness or clear pathologic hyperreflexia (excluding mild reflex asymmetry and isolated extensor plantar response)   *must provide value   []  Yes   [x]  No      10. Bilateral symmetric parkinsonism. The patient or caregiver reports bilateral symptom onset with no side predominance, and no side predominance is observed on objective examination   *must provide value   []  Yes   [x]  No        Criteria Application:    CLINICAL DIAGNOSIS    Most likely primary diagnosis   *must provide value   [x] PD   [] Alzheimer's disease   [] Chromosome-17 frontotemporal dementia   [] Corticobasal degeneration   [] Dementia with Lewy bodies   []  Dopa-responsive dystonia   [] Essential tremor   [] Hemiparkinson/hemiatrophy syndrome   [] Juvenile autosomal recessive parkinsonism   [] Motor neuron disease with parkinsonism   [] Multiple system atrophy   [] Neuroleptic-induced parkinsonism   [] Normal pressure hydrocephalus   [] Progressive supranuclear palsy   [] Psychogenic illness   [] Vascular parkinsonism   [] No PD nor other neurological disorder   [] Spinocerebellar Ataxia (SCA)   [] Other neurological disorders(s)(specify)      Most likely primary diagnosis- Other   *must provide value NA(Enter N/A if not applicable)    Diagnostic certainty of PD (Numeric (0-100%))                     100%  *must provide value   PARKINSON DISEASE HISTORY    Year of motor symptom onset (YYYY)                                     2023  *must provide value   Year of diagnosis (YYYY)                                                       2023  *must provide value   Has the patient ever been treated with levodopa?   *must provide value   [x] Yes   [] No    Year of levodopa initiation (2023)        First motor symptom   *must provide value   [] Tremor   [] Micrographia   [] stiffness/frozen shoulder   [] Impaired manual dexterity   [x] Gait disorder   [] General slowing up   [] Other        GENERAL STATUS EVALUATION   Global Versus Factor-Related Impression of Severity in Parkinson's Disease: A New Clinimetric Index (CISI-PD)    Motor Signs   *must provide value   []  Normal   []  Very mild   []  Mild   []  Mild to moderate   []  Moderate   [x]  Severe   []  Very severe      Disability   *must provide value   []  Normal   []  Minimal slowness and/or clumsiness   []  Slowness and/or clumsiness; no limitations   []  Limitation for demanding activities; does not need help for basic ADL   []  Limitation to perform basic ADL; help is required for some basic ADL   [x]  Great limitation to perform basic ADL; help is required for most or all basic ADL   []  Severely disabled; helpless;  complete assistance needed      Motor complications (dyskinesia and fluctuations)   *must provide value   [x]  Not at all   []  Very mild   []  Mild   []  Mild to moderate   []  Moderate   []  Severe   []  Very severe      Cognitive status   *must provide value   []  Normal   [x]  Slowness and/or minimal cognitive problems   []  Mild cognitive problems; no limitations   []  Mild to moderate cognitive problems; does not need help for basic ADL   []  Moderate cognitive problems; help is required for some basic ADL   []  Severe cognitive problems; help is required for most or all basic ADL

## 2025-03-07 NOTE — RESEARCH
Study Title: Black and  Americans Connections to Parkinson's Disease (BLAAC PD) A Project of the Global Parkinson's Genetics Program(GP2)    : Eva Hussein MD  IRB#: 2024.010  Initial IRB approval date: February 6, 2024    Study Personnel Conducting Visit:Elisha Mauro    Visit Time point:  Consent  Visit Date: 03/07/2025  Subject ID: [id number]  Official Enrollment Date: 03/07/2025         INFORMED CONSENT -  Present for discussion: Patient, Patient's spouse , , and CRC  Is LAR Consenting for Subject: yes.if yes, note relationship Wife   Is an impartial witness present? not applicable  Study Personnel Obtaining Consent: Elisha Mauro    Per Dr. Hussein (PI), I met with the pt in clinic to review the consent form for the [facility]. Pt was accompanied by those mentioned above. The nature of the research was fully explained to the potential subject and each page of the consent form was reviewed with the patient in the presence of those present for discussion. The purpose of the study, length of the study, procedures and study visits related to the study, risks, potential benefits, costs, payment for participation and/or reimbursement of expenses; alternative methods/treatments, study-related questions and compensation for injury, rights, contact information, voluntary participation, employees in research, new findings, DNA sequencing, return of research results, future research, study withdrawal, confidentiality, ClinicalTrials.gov and HIPAA authorization were fully discussed.     The subject was provided with ample time to review the consent, consider participation, and ask questions related to this study. All questions were answered appropriately and to their satisfaction.  Dr. Hussein (PI) also met with the patient and answered any additional questions that the pt had. The subject was able to verbalize understanding of the consent form and agreed to  participate. The Patient's spouse  signed the most recently IRB-approved version of the consent form  [IRB approval date: 4/22/2024 ([facility] V01/03/2024 ]   and was provided with a copy of the signed consent form, which includes the PI's contact information. Subject was also provided with the CRC's contact information. The original consent form was uploaded into the electronic medical records (Epic).     No study-related activity was initiated prior to obtaining consent.     -------------------------------------------------------------------------------------------------------------------    INCLUSION/EXCLUSION -  Dr. Hussein (PI) reviewed all of the inclusion and exclusion criteria for the GP2 study and verified that the Pt.is eligible at this time.  The Pt withdrew from the study or was excluded following enrollment? No  - If Yes, please describe:     NEUROLOGICAL HISTORY REVIEWED WITH PT -  See Provider note for details    REPORTABLE EVENTS -  Did Pt have a rxn to smell test? no  Did Pt.have a rxn to blood collection? no    QUESTIONNAIRES/DEMOGRAPHICS -     Please Note: All questions in the questionnaire require an answer (mandatory) unless otherwise specified.    Site Number: 207  *must provide value    Participant's ID [id number]  *must provide value    Enrollment Year (YYYY) : 2025  *must provide value    Age at Baseline Visit - 85  *must provide value    Sex Assigned at Birth (Choose only one)   *must provide value   [x] Male   [] Female   [] Intersex   [] Unknown   [] Other, Specify:      Race Category (Choose all that apply):   *must provide value   []  or    []    [x] Black or    []  or Other    [] White   [] Other      Education: (this question not mandatory)    [x] High School or less  [] High School/GED   [] Some college without degree   [] Associate degree college   [] Bachelor's degree   [] Master's degree   []  Professional or doctoral degree      How did you hear about the study?   *must provide value   [x] From my clinic   [] From my community   [] None of the above   [] Other, specify:      RECRUITMENT CATEGORY   *must provide value   [x] Case   [] Control      Participant has consented to contact about future research    [x] Yes   [] No      Has the sample been collected?   *must provide value   [x] Yes   [] No      What is the collected sample?   *must provide value   [x] Blood   [] Saliva     Sample Collection Notes: None    BIOSAMPLE COLLECTION:  Was blood collected? yes  Blood collected today at 03/07/2025    Was saliva collected? no      UPSIT SAMPLE COLLECTION:    Do you currently have an upper respiratory tract infection (e.g. head cold or flu)?    [] Yes  [x] No     Have you ever tested positive for COVID-19 (coronavirus)?    [] Yes  [x] No     Do you believe you were infected with COVID-19 (coronavirus), but did not undergo testing to confirm this diagnosis?    [] Yes  [x] No     Have you ever habitually smoked cigarettes?    [x] Never habitually smoked   [] Current smoker   [] Previous smoker    What age did you start? ____    What age did you stop? ____    On average throughout your smoke history, how many packs of cigarettes would you have smoked within a day? ______      Have you ever habitually smoked products other than conventional cigarettes (e.g. pipe, cigar, marijuana, electronic cigarettes)?    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    [x]Yes  []No     Final UPSIT score (0-100)                                                                     8   Has the participant fully completed the UPSIT? *must provide value   [] Yes  [] No     If no, please explain. *must provide value    [] Participant  declined compensation   [] Other; please explain other: ____________________________________________      Date of UPSIT completion (MM/DD/YYYY) *must provide value 03/07/2025     RE-CONSENTED participants (UPSIT ONLY): Did the participant complete the UPSIT? *must provide    [x] This is not a re-consented participant, this is a NEW participant   [] Yes, UPSIT is complete   [] No, UPSIT is not completed      Has the re-consented participant signed the compensation form and been compensated for the UPSIT?   *must provide value   [x] Yes, compensation was provided   [] No     If no, please explain Other   [] Participant declined compensation   [] Other        NEW participants: Did the participant provide a biosample and UPSIT? *must provide value    [x] Yes  [] No  [] This is an UPSIT-only participant        PATIENT COMPENSATION INFORMATION -   Participants receive $25 in compensation for their participation in the Global Parkinson's Genetic Program for completion of the UPSIT Smell Test.    Has the participant signed the compensation form?yes  Has the participant received compensation?yes  Has the participant completed all required visit activities and thus officially enrolled in the study?complete: enrollment of PD participant (blood or saliva collection + clinical data collection)  Form Completion Date: 03/07/2025  [place] #: [id number]

## 2025-03-12 ENCOUNTER — TELEPHONE (OUTPATIENT)
Dept: OTOLARYNGOLOGY | Facility: CLINIC | Age: 85
End: 2025-03-12
Payer: MEDICARE

## 2025-03-14 ENCOUNTER — OFFICE VISIT (OUTPATIENT)
Dept: OTOLARYNGOLOGY | Facility: CLINIC | Age: 85
End: 2025-03-14
Payer: MEDICARE

## 2025-03-14 DIAGNOSIS — H81.90 VESTIBULAR DIZZINESS: Primary | ICD-10-CM

## 2025-03-14 DIAGNOSIS — G20.C PARKINSONISM, UNSPECIFIED PARKINSONISM TYPE: ICD-10-CM

## 2025-03-14 DIAGNOSIS — Z74.09 IMPAIRED FUNCTIONAL MOBILITY, BALANCE, GAIT, AND ENDURANCE: ICD-10-CM

## 2025-03-14 PROCEDURE — 99204 OFFICE O/P NEW MOD 45 MIN: CPT | Mod: S$GLB,,, | Performed by: OTOLARYNGOLOGY

## 2025-03-14 PROCEDURE — 1101F PT FALLS ASSESS-DOCD LE1/YR: CPT | Mod: CPTII,S$GLB,, | Performed by: OTOLARYNGOLOGY

## 2025-03-14 PROCEDURE — 1126F AMNT PAIN NOTED NONE PRSNT: CPT | Mod: CPTII,S$GLB,, | Performed by: OTOLARYNGOLOGY

## 2025-03-14 PROCEDURE — 99999 PR PBB SHADOW E&M-EST. PATIENT-LVL II: CPT | Mod: PBBFAC,,, | Performed by: OTOLARYNGOLOGY

## 2025-03-14 PROCEDURE — 1159F MED LIST DOCD IN RCRD: CPT | Mod: CPTII,S$GLB,, | Performed by: OTOLARYNGOLOGY

## 2025-03-14 PROCEDURE — 3288F FALL RISK ASSESSMENT DOCD: CPT | Mod: CPTII,S$GLB,, | Performed by: OTOLARYNGOLOGY

## 2025-03-14 NOTE — PROGRESS NOTES
Subjective:       Patient ID: Chano Guillen is a 84 y.o. male.    Chief Complaint: dizziness and balance heron    84M with unknown neuromuscular disorder (with parkinson features) presents for follow up for his vertigo. He was last seen in 2019 after his VNG and audiogram. VNG showed bilateral peripheral neuropathy. Audiogram showed normal-moderate R>L SNHL. He was doing vestibular rehab which helped some at that time.     His vertigo and mobility are getting worse. L-DOPA made him more dizzy so that was discontinued. His vertigo is more pronounced with changes in head position (bending over, standing up, turning around, lying down, etc). He says that it he recovers quickly. He denies issues hearing. His other ENT complaints are frequent throat clearing and globus sensation. Denies dysphagia.     Per chart review, he follows closely with Neurology who prescribed amantadine to hopefully help with the vertigo and mobility, but they have not received it in the mail yet. He saw Dr. Dupont in August 2024 for dysphagia. Scope at that time showed bilateral hypomobility of vocal folds, presumed to be related to his underlying neurologic disorder. He appears to have been lost to follow up after that. His PCP ordered a MBSS last month but has not been scheduled.     Review of Systems   HENT:  Negative for ear discharge, ear pain, hearing loss, sore throat and trouble swallowing.    Neurological:  Positive for dizziness, weakness and light-headedness.       Objective:      Physical Exam  Vitals and nursing note reviewed.   Constitutional:       Appearance: He is well-developed.   HENT:      Head: Normocephalic and atraumatic.      Right Ear: Tympanic membrane, ear canal and external ear normal. There is impacted cerumen (removed with instrumentation under microscopy).      Left Ear: Tympanic membrane, ear canal and external ear normal.      Nose: Nose normal.      Mouth/Throat:      Pharynx: Uvula midline.   Eyes:       Comments: Bilateral ptosis (baseline)   Neck:      Thyroid: No thyroid mass.   Musculoskeletal:      Cervical back: Normal range of motion.   Neurological:      Comments: [place]-hallpike: induced room spinning vertigo bilaterally that was short lived but did not elicit classic nystagmus       Data:  VNG consistent with bilateral vestibular hypofunction.    Audio:      Audiogram tracings independently reviewed and discussed with patient.  There is a high frequency SNHL with overall normal pure tone average, and speech discrimination is 100%.  Tympanometry is type A in both ears.     MRI brain images  independently reviewed by me.  No evidence of IAC or inner ear anomaly, no evidence of acoustic neuroma.        Assessment:       1. Vestibular dizziness    2. Impaired functional mobility, balance, gait, and endurance    3. Parkinsonism, unspecified Parkinsonism type    Patient with bilateral vestibular weakness. Etiology unknown likely multifactorial related to presbystasis and other co-morbidities, which was discussed in detail with the patient.        Plan:          - referral to vestibular rehab  - offered repeat audiogram but patient and wife denied today  - will place referral to other ENT to evaluate throat complaints  - encourage patient and patient's wife to schedule his ordered MBSS  - continue neurology follow up

## 2025-03-14 NOTE — LETTER
March 19, 2025        Eva Hussein MD  [address]  [facility] [address]             [facility] [address]  [address]  [facility] [address]  Phone: [phone]  Fax: [phone]   Patient: Chano Guillen   MR Number: [id number]   YOB: 1940   Date of Visit: 3/14/2025       Dear Dr. Hussein:    Thank you for referring Chano Guillen to me for evaluation. Below are the relevant portions of my assessment and plan of care.      1. Vestibular dizziness    2. Impaired functional mobility, balance, gait, and endurance    3. Parkinsonism, unspecified Parkinsonism type    Patient with bilateral vestibular weakness. Etiology unknown likely multifactorial related to presbystasis and other co-morbidities, which was discussed in detail with the patient.            If you have questions, please do not hesitate to call me. I look forward to following Chano along with you.    Sincerely,      Master, MD George           CC  No Recipients

## 2025-06-18 DIAGNOSIS — N40.0 BENIGN PROSTATIC HYPERPLASIA, UNSPECIFIED WHETHER LOWER URINARY TRACT SYMPTOMS PRESENT: Primary | ICD-10-CM

## 2025-09-04 ENCOUNTER — OFFICE VISIT (OUTPATIENT)
Dept: INTERNAL MEDICINE | Facility: CLINIC | Age: 85
End: 2025-09-04
Payer: MEDICARE

## 2025-09-04 VITALS
OXYGEN SATURATION: 98 % | SYSTOLIC BLOOD PRESSURE: 140 MMHG | WEIGHT: 178.13 LBS | HEIGHT: 68 IN | BODY MASS INDEX: 27 KG/M2 | DIASTOLIC BLOOD PRESSURE: 86 MMHG | HEART RATE: 64 BPM

## 2025-09-04 DIAGNOSIS — M81.0 AGE-RELATED OSTEOPOROSIS WITHOUT CURRENT PATHOLOGICAL FRACTURE: ICD-10-CM

## 2025-09-04 DIAGNOSIS — I10 HYPERTENSION, UNSPECIFIED TYPE: ICD-10-CM

## 2025-09-04 DIAGNOSIS — K21.9 GASTROESOPHAGEAL REFLUX DISEASE, UNSPECIFIED WHETHER ESOPHAGITIS PRESENT: ICD-10-CM

## 2025-09-04 DIAGNOSIS — J34.89 SINUS PRESSURE: ICD-10-CM

## 2025-09-04 DIAGNOSIS — Z76.89 ENCOUNTER TO ESTABLISH CARE: Primary | ICD-10-CM

## 2025-09-04 DIAGNOSIS — G20.C PARKINSONISM, UNSPECIFIED PARKINSONISM TYPE: ICD-10-CM

## 2025-09-04 DIAGNOSIS — F41.9 ANXIETY: ICD-10-CM

## 2025-09-04 DIAGNOSIS — Z74.09 IMPAIRED FUNCTIONAL MOBILITY, BALANCE, GAIT, AND ENDURANCE: ICD-10-CM

## 2025-09-04 DIAGNOSIS — R35.0 URINARY FREQUENCY: ICD-10-CM

## 2025-09-04 DIAGNOSIS — R97.20 ELEVATED PSA: ICD-10-CM

## 2025-09-04 DIAGNOSIS — E05.90 SUBCLINICAL HYPERTHYROIDISM: ICD-10-CM

## 2025-09-04 DIAGNOSIS — E78.5 HYPERLIPIDEMIA, UNSPECIFIED HYPERLIPIDEMIA TYPE: ICD-10-CM

## 2025-09-04 DIAGNOSIS — R73.03 PRE-DIABETES: ICD-10-CM

## 2025-09-04 DIAGNOSIS — H81.90 VESTIBULAR DIZZINESS: ICD-10-CM

## 2025-09-04 DIAGNOSIS — N40.0 BENIGN PROSTATIC HYPERPLASIA, UNSPECIFIED WHETHER LOWER URINARY TRACT SYMPTOMS PRESENT: ICD-10-CM

## 2025-09-04 PROCEDURE — 99999 PR PBB SHADOW E&M-EST. PATIENT-LVL IV: CPT | Mod: PBBFAC,GC,,

## 2025-09-04 RX ORDER — FLUTICASONE PROPIONATE 50 MCG
1 SPRAY, SUSPENSION (ML) NASAL DAILY
Qty: 11.1 ML | Refills: 0 | Status: SHIPPED | OUTPATIENT
Start: 2025-09-04

## 2025-09-04 RX ORDER — SERTRALINE HYDROCHLORIDE 25 MG/1
25 TABLET, FILM COATED ORAL DAILY
Qty: 30 TABLET | Refills: 11 | Status: SHIPPED | OUTPATIENT
Start: 2025-09-04 | End: 2026-09-04